# Patient Record
Sex: MALE | Race: WHITE | NOT HISPANIC OR LATINO | Employment: OTHER | URBAN - METROPOLITAN AREA
[De-identification: names, ages, dates, MRNs, and addresses within clinical notes are randomized per-mention and may not be internally consistent; named-entity substitution may affect disease eponyms.]

---

## 2021-04-16 ENCOUNTER — APPOINTMENT (OUTPATIENT)
Dept: GENERAL RADIOLOGY | Facility: HOSPITAL | Age: 46
End: 2021-04-16

## 2021-04-16 ENCOUNTER — HOSPITAL ENCOUNTER (EMERGENCY)
Facility: HOSPITAL | Age: 46
Discharge: HOME | End: 2021-04-16
Attending: EMERGENCY MEDICINE | Admitting: EMERGENCY MEDICINE

## 2021-04-16 VITALS
WEIGHT: 196 LBS | HEIGHT: 70 IN | DIASTOLIC BLOOD PRESSURE: 83 MMHG | BODY MASS INDEX: 28.06 KG/M2 | TEMPERATURE: 98.2 F | OXYGEN SATURATION: 95 % | HEART RATE: 74 BPM | SYSTOLIC BLOOD PRESSURE: 124 MMHG | RESPIRATION RATE: 18 BRPM

## 2021-04-16 DIAGNOSIS — Z45.02 AICD DISCHARGE: Primary | ICD-10-CM

## 2021-04-16 DIAGNOSIS — I47.20 VENTRICULAR TACHYARRHYTHMIA (HCC): ICD-10-CM

## 2021-04-16 LAB
ALBUMIN SERPL-MCNC: 4.5 G/DL (ref 3.5–5.2)
ALBUMIN/GLOB SERPL: 1.7 G/DL
ALP SERPL-CCNC: 92 U/L (ref 39–117)
ALT SERPL W P-5'-P-CCNC: 19 U/L (ref 1–41)
ANION GAP SERPL CALCULATED.3IONS-SCNC: 7 MMOL/L (ref 5–15)
AST SERPL-CCNC: 19 U/L (ref 1–40)
BASOPHILS # BLD AUTO: 0.25 10*3/MM3 (ref 0–0.2)
BASOPHILS NFR BLD AUTO: 1.7 % (ref 0–1.5)
BILIRUB SERPL-MCNC: 0.4 MG/DL (ref 0–1.2)
BUN SERPL-MCNC: 9 MG/DL (ref 6–20)
BUN/CREAT SERPL: 8.5 (ref 7–25)
CALCIUM SPEC-SCNC: 9.4 MG/DL (ref 8.6–10.5)
CHLORIDE SERPL-SCNC: 105 MMOL/L (ref 98–107)
CO2 SERPL-SCNC: 28 MMOL/L (ref 22–29)
CREAT SERPL-MCNC: 1.06 MG/DL (ref 0.76–1.27)
DEPRECATED RDW RBC AUTO: 58.1 FL (ref 37–54)
EOSINOPHIL # BLD AUTO: 0.29 10*3/MM3 (ref 0–0.4)
EOSINOPHIL NFR BLD AUTO: 1.9 % (ref 0.3–6.2)
ERYTHROCYTE [DISTWIDTH] IN BLOOD BY AUTOMATED COUNT: 18.4 % (ref 12.3–15.4)
GFR SERPL CREATININE-BSD FRML MDRD: 76 ML/MIN/1.73
GLOBULIN UR ELPH-MCNC: 2.6 GM/DL
GLUCOSE SERPL-MCNC: 104 MG/DL (ref 65–99)
HCT VFR BLD AUTO: 48.6 % (ref 37.5–51)
HGB BLD-MCNC: 15.8 G/DL (ref 13–17.7)
HOLD SPECIMEN: NORMAL
IMM GRANULOCYTES # BLD AUTO: 0.11 10*3/MM3 (ref 0–0.05)
IMM GRANULOCYTES NFR BLD AUTO: 0.7 % (ref 0–0.5)
LIPASE SERPL-CCNC: 25 U/L (ref 13–60)
LYMPHOCYTES # BLD AUTO: 1.33 10*3/MM3 (ref 0.7–3.1)
LYMPHOCYTES NFR BLD AUTO: 8.8 % (ref 19.6–45.3)
MCH RBC QN AUTO: 29 PG (ref 26.6–33)
MCHC RBC AUTO-ENTMCNC: 32.5 G/DL (ref 31.5–35.7)
MCV RBC AUTO: 89.2 FL (ref 79–97)
MONOCYTES # BLD AUTO: 0.78 10*3/MM3 (ref 0.1–0.9)
MONOCYTES NFR BLD AUTO: 5.2 % (ref 5–12)
NEUTROPHILS NFR BLD AUTO: 12.33 10*3/MM3 (ref 1.7–7)
NEUTROPHILS NFR BLD AUTO: 81.7 % (ref 42.7–76)
NRBC BLD AUTO-RTO: 0.2 /100 WBC (ref 0–0.2)
NT-PROBNP SERPL-MCNC: 104.1 PG/ML (ref 0–450)
PLAT MORPH BLD: NORMAL
PLATELET # BLD AUTO: 492 10*3/MM3 (ref 140–450)
PMV BLD AUTO: 11 FL (ref 6–12)
POTASSIUM SERPL-SCNC: 4.6 MMOL/L (ref 3.5–5.2)
PROT SERPL-MCNC: 7.1 G/DL (ref 6–8.5)
RBC # BLD AUTO: 5.45 10*6/MM3 (ref 4.14–5.8)
RBC MORPH BLD: NORMAL
SODIUM SERPL-SCNC: 140 MMOL/L (ref 136–145)
TROPONIN T SERPL-MCNC: <0.01 NG/ML (ref 0–0.03)
TROPONIN T SERPL-MCNC: <0.01 NG/ML (ref 0–0.03)
WBC # BLD AUTO: 15.09 10*3/MM3 (ref 3.4–10.8)
WBC MORPH BLD: NORMAL
WHOLE BLOOD HOLD SPECIMEN: NORMAL
WHOLE BLOOD HOLD SPECIMEN: NORMAL

## 2021-04-16 PROCEDURE — 84484 ASSAY OF TROPONIN QUANT: CPT | Performed by: EMERGENCY MEDICINE

## 2021-04-16 PROCEDURE — 83690 ASSAY OF LIPASE: CPT | Performed by: EMERGENCY MEDICINE

## 2021-04-16 PROCEDURE — 80053 COMPREHEN METABOLIC PANEL: CPT | Performed by: EMERGENCY MEDICINE

## 2021-04-16 PROCEDURE — 99284 EMERGENCY DEPT VISIT MOD MDM: CPT

## 2021-04-16 PROCEDURE — 83880 ASSAY OF NATRIURETIC PEPTIDE: CPT | Performed by: EMERGENCY MEDICINE

## 2021-04-16 PROCEDURE — 85025 COMPLETE CBC W/AUTO DIFF WBC: CPT | Performed by: EMERGENCY MEDICINE

## 2021-04-16 PROCEDURE — 93005 ELECTROCARDIOGRAM TRACING: CPT | Performed by: EMERGENCY MEDICINE

## 2021-04-16 PROCEDURE — 93005 ELECTROCARDIOGRAM TRACING: CPT

## 2021-04-16 PROCEDURE — 71045 X-RAY EXAM CHEST 1 VIEW: CPT

## 2021-04-16 PROCEDURE — 99285 EMERGENCY DEPT VISIT HI MDM: CPT

## 2021-04-16 PROCEDURE — 85007 BL SMEAR W/DIFF WBC COUNT: CPT | Performed by: EMERGENCY MEDICINE

## 2021-04-16 RX ORDER — ASPIRIN 81 MG/1
81 TABLET ORAL DAILY
COMMUNITY
End: 2021-07-09 | Stop reason: HOSPADM

## 2021-04-16 RX ORDER — SODIUM CHLORIDE 0.9 % (FLUSH) 0.9 %
10 SYRINGE (ML) INJECTION AS NEEDED
Status: DISCONTINUED | OUTPATIENT
Start: 2021-04-16 | End: 2021-04-16 | Stop reason: HOSPADM

## 2021-04-16 RX ORDER — ASPIRIN 81 MG/1
324 TABLET, CHEWABLE ORAL ONCE
Status: DISCONTINUED | OUTPATIENT
Start: 2021-04-16 | End: 2021-04-16 | Stop reason: HOSPADM

## 2021-04-16 RX ORDER — METOPROLOL SUCCINATE 25 MG/1
25 TABLET, EXTENDED RELEASE ORAL DAILY
Qty: 30 TABLET | Refills: 11 | Status: SHIPPED | OUTPATIENT
Start: 2021-04-16 | End: 2021-07-09 | Stop reason: HOSPADM

## 2021-04-16 NOTE — CASE MANAGEMENT/SOCIAL WORK
Continued Stay Note   [facility]     Patient Name: Sonido Newsome  MRN: [id number]  Today's Date: 4/16/2021    Admit Date: 4/16/2021    Discharge Plan     Row Name 04/16/21 1416       Plan    Plan Comments  CM received call from ED RN regarding discharge planning. Patient in need of a follow up appointment with Dr Ellison in one month. VASU called Dr Ellison's office, [phone] spoke with Shaina. Shaina stated to fax ED records to her @ [phone] and they will follow up and call patient to schedule the appointment. CM updated patient. Fax completed. Patient verbalized an understanding.             Preethi Bains RN

## 2021-04-16 NOTE — ED PROVIDER NOTES
Subjective   Patient presents emergency department after being shocked today by his AICD.  He tells me it occurred after he was outside in the cold he came back inside to where it was warm.  He developed a headache and he felt like his blood pressure was really high and he felt the shock.  He was not knocked out.  He did not fall on the ground.  He immediately started to feel better.  He has never been shocked before.  He tells me it was placed around 2007 and he has seen Dr. Ellison in the past but has not seen him for around 4 years.  He currently tells me he feels fine.  He denies any chest pain or difficulty breathing.  He denies any history of similar.      Pacemaker Problem  Location:  Shocked  Severity:  Moderate  Onset quality:  Sudden  Duration: several seconds.  Timing:  Constant  Progression:  Resolved  Relieved by:  Nothing  Worsened by:  Nothing  Associated symptoms: no abdominal pain, no chest pain, no congestion, no cough, no diarrhea, no fever, no nausea, no shortness of breath, no sore throat, no vomiting and no wheezing        Review of Systems   Constitutional: Negative for chills, diaphoresis and fever.   HENT: Negative for congestion and sore throat.    Respiratory: Negative for cough, choking, chest tightness, shortness of breath and wheezing.    Cardiovascular: Negative for chest pain and leg swelling.   Gastrointestinal: Negative for abdominal distention, abdominal pain, anal bleeding, blood in stool, constipation, diarrhea, nausea and vomiting.   Genitourinary: Negative for difficulty urinating, dysuria, flank pain, frequency, hematuria and urgency.   All other systems reviewed and are negative.      History reviewed. No pertinent past medical history.    No Known Allergies    History reviewed. No pertinent surgical history.    History reviewed. No pertinent family history.    Social History     Socioeconomic History   • Marital status:      Spouse name: Not on file   • Number of  "children: Not on file   • Years of education: Not on file   • Highest education level: Not on file   Tobacco Use   • Smoking status: Current Every Day Smoker     Packs/day: 1.00     Types: Cigarettes   • Smokeless tobacco: Never Used   Substance and Sexual Activity   • Alcohol use: Yes     Comment: socially patient states \"1 shot per month if that\"           Objective   Physical Exam  Constitutional:       Appearance: He is well-developed.   HENT:      Head: Normocephalic and atraumatic.      Right Ear: External ear normal.      Left Ear: External ear normal.      Nose: Nose normal.   Eyes:      Conjunctiva/sclera: Conjunctivae normal.      Pupils: Pupils are equal, round, and reactive to light.   Cardiovascular:      Rate and Rhythm: Normal rate and regular rhythm.      Heart sounds: Normal heart sounds.   Pulmonary:      Effort: Pulmonary effort is normal.      Breath sounds: Normal breath sounds.   Abdominal:      General: Bowel sounds are normal.      Palpations: Abdomen is soft.   Musculoskeletal:         General: Normal range of motion.      Cervical back: Normal range of motion and neck supple.   Skin:     General: Skin is warm and dry.   Neurological:      Mental Status: He is alert and oriented to person, place, and time.   Psychiatric:         Behavior: Behavior normal.         Judgment: Judgment normal.         Procedures           ED Course  ED Course as of Apr 16 1539   Fri Apr 16, 2021   1240 Our interrogation machine is down.  We are awaiting the reps arrival.    [JM]   1318 Case discussed with Dr. Ziegler and we reviewed the interrogation.  The results are VF at 224 ATP x131 J.  Patient still resting comfortably.  He has seen Dr. Ellison in the past he notes been at least 4 years per the patient.  I have paged Geovanni Gonzáles on call for Dr. Ellison.    [JM]   6569 Late entry I spoke to Geovanni Gonzáles who will see the patient in the emergency department.  The patient advises me he has not been taking any " medications except for an aspirin.    [SARA]   7769 Patient seen by cardiology.  He is to follow-up with them in 4 weeks they started him on a beta-blocker.  I did not speak with the patient prior to him leaving the emergency department.    [JM]      ED Course User Index  [JM] Fawad Pabon APRN                                           [facility]    Final diagnoses:   AICD discharge   Ventricular tachyarrhythmia (CMS/HCC)       ED Disposition  ED Disposition     ED Disposition Condition Comment    Discharge            Provider, No Known  [place] [id number]               Medication List      New Prescriptions    metoprolol succinate XL 25 MG 24 hr tablet  Commonly known as: TOPROL-XL  Take 1 tablet by mouth Daily.           Where to Get Your Medications      These medications were sent to [facility] [address] [place] [address] - [address] [place] [address] [phone]  - [phone] [place] [address]    Phone: [phone]   · metoprolol succinate XL 25 MG 24 hr tablet          Fawad Pabon APRN  04/16/21 1538

## 2021-04-16 NOTE — CONSULTS
[facility] Consult Note      Patient Care Team:  Provider, No Known as PCP - General  Provider, No Known  No ref. provider found    Subjective     History of Present Illness: Mr. Newsome is a pleasant 45-year-old male with known ischemic cardiomyopathy.  He is post [employer] dual-chamber ICD.  His last heart catheterization was in 2014 which revealed a  of the mid LAD.  He is currently being seen in the ED after a device shock.  He says this morning he went out to warm his truck when he got back in side he felt a head rush and then received a device shock.  Device interrogation reveals VF with appropriate discharge.  He currently is denying any chest pain, shortness of breath, or palpitations and says that he feels fine.  He says that he works out daily with at least 30 minutes of cardio and recently kayaked 12 to 13 miles.  The only medication that he takes at home is 81 mg aspirin.  He says that he lost his insurance and has not been able to afford his medications.  He does however have [employer] insurance currently.      Current Facility-Administered Medications:   •  aspirin chewable tablet 324 mg, 324 mg, Oral, Once, Gm Ziegler MD, Stopped at 04/16/21 1222  •  sodium chloride 0.9 % flush 10 mL, 10 mL, Intravenous, PRN, Gm Ziegler MD    Current Outpatient Medications:   •  aspirin 81 MG EC tablet, Take 81 mg by mouth Daily., Disp: , Rfl:   •  metoprolol succinate XL (TOPROL-XL) 25 MG 24 hr tablet, Take 1 tablet by mouth Daily., Disp: 30 tablet, Rfl: 11    Social History     Socioeconomic History   • Marital status:      Spouse name: Not on file   • Number of children: Not on file   • Years of education: Not on file   • Highest education level: Not on file   Tobacco Use   • Smoking status: Current Every Day Smoker     Packs/day: 1.00     Types: Cigarettes   • Smokeless tobacco: Never Used   Substance and Sexual Activity   • Alcohol  "use: Yes     Comment: socially patient states \"1 shot per month if that\"       History reviewed. No pertinent family history.    Review of Systems   Constitutional: Negative.    Eyes: Negative.    Respiratory: Negative.    Cardiovascular: Negative.    Gastrointestinal: Negative.    Endocrine: Negative.    Genitourinary: Negative.    Musculoskeletal: Negative.    Skin: Negative.    Allergic/Immunologic: Negative.    Neurological: Positive for headaches.   Hematological: Negative.    Psychiatric/Behavioral: Negative.           Objective     Vital Signs  Temp:  [98.2 °F (36.8 °C)] 98.2 °F (36.8 °C)  Heart Rate:  [71-90] 74  Resp:  [18] 18  BP: (119-140)/(74-97) 122/81    No intake or output data in the 24 hours ending 04/16/21 1353  No intake/output data recorded.    Constitutional:       Appearance: Not in distress.   Eyes:      Conjunctiva/sclera: Conjunctivae normal.      Pupils: Pupils are equal, round, and reactive to light.   HENT:      Nose: Nose normal.    Mouth/Throat:      Pharynx: Oropharynx is clear.   Neck:      Thyroid: Thyroid normal. No thyromegaly.      Vascular: JVD normal.      Lymphadenopathy: No cervical adenopathy.   Pulmonary:      Effort: Pulmonary effort is normal.      Breath sounds: Rhonchi present.   Chest:      Chest wall: Not tender to palpatation.   Cardiovascular:      Normal rate. Regular rhythm.      Murmurs: There is no murmur.      No gallop. No click.   Abdominal:      General: Bowel sounds are normal. There is no distension.      Palpations: Abdomen is soft.      Tenderness: There is no abdominal tenderness.   Musculoskeletal: Normal range of motion.         General: No tenderness.      Cervical back: Normal range of motion. Skin:     General: Skin is warm and dry.   Neurological:      Mental Status: Alert and oriented to person, place and time.           Results Review:    I reviewed the patient's new clinical results.    WBC WBC   Date/Time Value Ref Range Status   04/16/2021 " 1051 15.09 (H) 3.40 - 10.80 10*3/mm3 Final      HGB Hemoglobin   Date/Time Value Ref Range Status   04/16/2021 1051 15.8 13.0 - 17.7 g/dL Final      HCT Hematocrit   Date/Time Value Ref Range Status   04/16/2021 1051 48.6 37.5 - 51.0 % Final      Platelets Platelets   Date/Time Value Ref Range Status   04/16/2021 1051 492 (H) 140 - 450 10*3/mm3 Final        PT/INR:    No results found for: PROTIME, INR    Sodium Sodium   Date/Time Value Ref Range Status   04/16/2021 1051 140 136 - 145 mmol/L Final      Potassium Potassium   Date/Time Value Ref Range Status   04/16/2021 1051 4.6 3.5 - 5.2 mmol/L Final     Comment:     Slight hemolysis detected by analyzer. Results may be affected.      Chloride Chloride   Date/Time Value Ref Range Status   04/16/2021 1051 105 98 - 107 mmol/L Final      Bicarbonate No results found for: PLASMABICARB   BUN BUN   Date/Time Value Ref Range Status   04/16/2021 1051 9 6 - 20 mg/dL Final      Creatinine Creatinine   Date/Time Value Ref Range Status   04/16/2021 1051 1.06 0.76 - 1.27 mg/dL Final      Calcium Calcium   Date/Time Value Ref Range Status   04/16/2021 1051 9.4 8.6 - 10.5 mg/dL Final      Magnesium @RESULFAST(MG:3)@   Troponin       No results found for: TROPONINI                EKG: normal sinus rhythm, anterolateral infarct.      There is no problem list on file for this patient.      Assessment/Plan   45-year-old male with ischemic cardiomyopathy.  He received an appropriate device shock due to VF.  He is currently denying any complaints    Add beta-blocker  Follow-up in 4 weeks      I discussed the patient's findings and my recommendations with patient, nursing staff and primary care team    ESTIVEN Ramirez  04/16/21  13:53 EDT

## 2021-04-24 LAB
QT INTERVAL: 374 MS
QT INTERVAL: 400 MS
QTC INTERVAL: 452 MS
QTC INTERVAL: 464 MS

## 2021-07-08 ENCOUNTER — HOSPITAL ENCOUNTER (OUTPATIENT)
Facility: HOSPITAL | Age: 46
Discharge: HOME | End: 2021-07-09
Attending: INTERNAL MEDICINE | Admitting: INTERNAL MEDICINE

## 2021-07-08 ENCOUNTER — APPOINTMENT (OUTPATIENT)
Dept: CARDIOLOGY | Facility: HOSPITAL | Age: 46
End: 2021-07-08

## 2021-07-08 PROBLEM — Z72.0 TOBACCO USE: Status: ACTIVE | Noted: 2021-07-08

## 2021-07-08 PROBLEM — Z86.79 HISTORY OF VENTRICULAR TACHYCARDIA: Status: ACTIVE | Noted: 2021-07-08

## 2021-07-08 PROBLEM — I34.0 MODERATE MITRAL REGURGITATION: Status: ACTIVE | Noted: 2021-07-08

## 2021-07-08 PROBLEM — I47.20 VENTRICULAR TACHYCARDIA: Status: ACTIVE | Noted: 2021-07-08

## 2021-07-08 PROBLEM — Z72.0 TOBACCO USE: Chronic | Status: ACTIVE | Noted: 2021-07-08

## 2021-07-08 PROBLEM — I25.10 CAD (CORONARY ARTERY DISEASE): Status: ACTIVE | Noted: 2021-07-08

## 2021-07-08 PROBLEM — I42.8 NICM (NONISCHEMIC CARDIOMYOPATHY) (HCC): Chronic | Status: ACTIVE | Noted: 2021-07-08

## 2021-07-08 PROBLEM — I34.0 MODERATE MITRAL REGURGITATION: Chronic | Status: ACTIVE | Noted: 2021-07-08

## 2021-07-08 PROBLEM — I50.20 HFREF (HEART FAILURE WITH REDUCED EJECTION FRACTION): Chronic | Status: ACTIVE | Noted: 2021-07-08

## 2021-07-08 PROBLEM — I25.10 CAD (CORONARY ARTERY DISEASE): Chronic | Status: ACTIVE | Noted: 2021-07-08

## 2021-07-08 PROBLEM — I42.8 NICM (NONISCHEMIC CARDIOMYOPATHY) (HCC): Status: ACTIVE | Noted: 2021-07-08

## 2021-07-08 PROBLEM — I50.20 HFREF (HEART FAILURE WITH REDUCED EJECTION FRACTION): Status: ACTIVE | Noted: 2021-07-08

## 2021-07-08 LAB
ANION GAP SERPL CALCULATED.3IONS-SCNC: 13 MMOL/L (ref 5–15)
ASCENDING AORTA: 3.3 CM
BH CV ECHO MEAS - AO MAX PG (FULL): -0.26 MMHG
BH CV ECHO MEAS - AO MAX PG: 2.7 MMHG
BH CV ECHO MEAS - AO MEAN PG (FULL): 0.26 MMHG
BH CV ECHO MEAS - AO MEAN PG: 1.9 MMHG
BH CV ECHO MEAS - AO ROOT AREA (BSA CORRECTED): 1.6
BH CV ECHO MEAS - AO ROOT AREA: 9.1 CM^2
BH CV ECHO MEAS - AO ROOT DIAM: 3.4 CM
BH CV ECHO MEAS - AO V2 MAX: 82 CM/SEC
BH CV ECHO MEAS - AO V2 MEAN: 65.4 CM/SEC
BH CV ECHO MEAS - AO V2 VTI: 16.6 CM
BH CV ECHO MEAS - ASC AORTA: 3.3 CM
BH CV ECHO MEAS - AVA(I,A): 2.9 CM^2
BH CV ECHO MEAS - AVA(I,D): 2.9 CM^2
BH CV ECHO MEAS - AVA(V,A): 3.2 CM^2
BH CV ECHO MEAS - AVA(V,D): 3.2 CM^2
BH CV ECHO MEAS - BSA(HAYCOCK): 2.1 M^2
BH CV ECHO MEAS - BSA: 2.1 M^2
BH CV ECHO MEAS - BZI_BMI: 28.1 KILOGRAMS/M^2
BH CV ECHO MEAS - BZI_METRIC_HEIGHT: 177.8 CM
BH CV ECHO MEAS - BZI_METRIC_WEIGHT: 88.9 KG
BH CV ECHO MEAS - EDV(CUBED): 249 ML
BH CV ECHO MEAS - EDV(MOD-SP2): 227 ML
BH CV ECHO MEAS - EDV(MOD-SP4): 235 ML
BH CV ECHO MEAS - EDV(TEICH): 200.5 ML
BH CV ECHO MEAS - EF(CUBED): 26.3 %
BH CV ECHO MEAS - EF(MOD-BP): 22 %
BH CV ECHO MEAS - EF(MOD-SP2): 22 %
BH CV ECHO MEAS - EF(MOD-SP4): 20.4 %
BH CV ECHO MEAS - EF(TEICH): 20.7 %
BH CV ECHO MEAS - ESV(CUBED): 183.6 ML
BH CV ECHO MEAS - ESV(MOD-SP2): 177 ML
BH CV ECHO MEAS - ESV(MOD-SP4): 187 ML
BH CV ECHO MEAS - ESV(TEICH): 159 ML
BH CV ECHO MEAS - FS: 9.7 %
BH CV ECHO MEAS - IVS/LVPW: 0.96
BH CV ECHO MEAS - IVSD: 0.88 CM
BH CV ECHO MEAS - LA DIMENSION: 4.2 CM
BH CV ECHO MEAS - LA/AO: 1.2
BH CV ECHO MEAS - LAD MAJOR: 5.6 CM
BH CV ECHO MEAS - LAT PEAK E' VEL: 7.3 CM/SEC
BH CV ECHO MEAS - LATERAL E/E' RATIO: 13.4
BH CV ECHO MEAS - LV DIASTOLIC VOL/BSA (35-75): 113.6 ML/M^2
BH CV ECHO MEAS - LV IVRT: 0.07 SEC
BH CV ECHO MEAS - LV MASS(C)D: 234 GRAMS
BH CV ECHO MEAS - LV MASS(C)DI: 113.1 GRAMS/M^2
BH CV ECHO MEAS - LV MAX PG: 3 MMHG
BH CV ECHO MEAS - LV MEAN PG: 1.7 MMHG
BH CV ECHO MEAS - LV SYSTOLIC VOL/BSA (12-30): 90.4 ML/M^2
BH CV ECHO MEAS - LV V1 MAX: 85.9 CM/SEC
BH CV ECHO MEAS - LV V1 MEAN: 58.5 CM/SEC
BH CV ECHO MEAS - LV V1 VTI: 15.7 CM
BH CV ECHO MEAS - LVIDD: 6.3 CM
BH CV ECHO MEAS - LVIDS: 5.7 CM
BH CV ECHO MEAS - LVLD AP2: 10.4 CM
BH CV ECHO MEAS - LVLD AP4: 10.8 CM
BH CV ECHO MEAS - LVLS AP2: 10.5 CM
BH CV ECHO MEAS - LVLS AP4: 10.5 CM
BH CV ECHO MEAS - LVOT AREA (M): 3.1 CM^2
BH CV ECHO MEAS - LVOT AREA: 3.1 CM^2
BH CV ECHO MEAS - LVOT DIAM: 2 CM
BH CV ECHO MEAS - LVPWD: 0.92 CM
BH CV ECHO MEAS - MED PEAK E' VEL: 8.2 CM/SEC
BH CV ECHO MEAS - MEDIAL E/E' RATIO: 12
BH CV ECHO MEAS - MV A MAX VEL: 50.8 CM/SEC
BH CV ECHO MEAS - MV DEC SLOPE: 321.3 CM/SEC^2
BH CV ECHO MEAS - MV DEC TIME: 0.18 SEC
BH CV ECHO MEAS - MV E MAX VEL: 100.7 CM/SEC
BH CV ECHO MEAS - MV E/A: 2
BH CV ECHO MEAS - MV P1/2T MAX VEL: 98 CM/SEC
BH CV ECHO MEAS - MV P1/2T: 89.4 MSEC
BH CV ECHO MEAS - MVA P1/2T LCG: 2.2 CM^2
BH CV ECHO MEAS - MVA(P1/2T): 2.5 CM^2
BH CV ECHO MEAS - PA ACC SLOPE: 377.9 CM/SEC^2
BH CV ECHO MEAS - PA ACC TIME: 0.16 SEC
BH CV ECHO MEAS - PA PR(ACCEL): 6.1 MMHG
BH CV ECHO MEAS - PI END-D VEL: 112.7 CM/SEC
BH CV ECHO MEAS - RAP SYSTOLE: 15 MMHG
BH CV ECHO MEAS - RVDD: 2 CM
BH CV ECHO MEAS - RVSP: 40 MMHG
BH CV ECHO MEAS - SI(AO): 73.1 ML/M^2
BH CV ECHO MEAS - SI(CUBED): 31.6 ML/M^2
BH CV ECHO MEAS - SI(LVOT): 23.3 ML/M^2
BH CV ECHO MEAS - SI(MOD-SP2): 24.2 ML/M^2
BH CV ECHO MEAS - SI(MOD-SP4): 23.2 ML/M^2
BH CV ECHO MEAS - SI(TEICH): 20.1 ML/M^2
BH CV ECHO MEAS - SV(AO): 151.2 ML
BH CV ECHO MEAS - SV(CUBED): 65.4 ML
BH CV ECHO MEAS - SV(LVOT): 48.1 ML
BH CV ECHO MEAS - SV(MOD-SP2): 50 ML
BH CV ECHO MEAS - SV(MOD-SP4): 48 ML
BH CV ECHO MEAS - SV(TEICH): 41.5 ML
BH CV ECHO MEAS - TAPSE (>1.6): 1.7 CM
BH CV ECHO MEAS - TR MAX PG: 25 MMHG
BH CV ECHO MEAS - TR MAX VEL: 247.3 CM/SEC
BH CV ECHO MEASUREMENTS AVERAGE E/E' RATIO: 12.99
BH CV VAS BP RIGHT ARM: NORMAL MMHG
BH CV XLRA - RV BASE: 4.5 CM
BH CV XLRA - RV LENGTH: 9.3 CM
BH CV XLRA - RV MID: 3.5 CM
BH CV XLRA - TDI S': 7.46 CM/SEC
BUN SERPL-MCNC: 8 MG/DL (ref 6–20)
BUN/CREAT SERPL: 9 (ref 7–25)
CALCIUM SPEC-SCNC: 8.5 MG/DL (ref 8.6–10.5)
CHLORIDE SERPL-SCNC: 105 MMOL/L (ref 98–107)
CO2 SERPL-SCNC: 22 MMOL/L (ref 22–29)
CREAT SERPL-MCNC: 0.89 MG/DL (ref 0.76–1.27)
DEPRECATED RDW RBC AUTO: 57.6 FL (ref 37–54)
ERYTHROCYTE [DISTWIDTH] IN BLOOD BY AUTOMATED COUNT: 17.7 % (ref 12.3–15.4)
GFR SERPL CREATININE-BSD FRML MDRD: 93 ML/MIN/1.73
GLUCOSE SERPL-MCNC: 115 MG/DL (ref 65–99)
HCT VFR BLD AUTO: 44.9 % (ref 37.5–51)
HGB BLD-MCNC: 14.3 G/DL (ref 13–17.7)
LEFT ATRIUM VOLUME INDEX: 41.6 ML/M^2
LEFT ATRIUM VOLUME: 86 ML
LV EF 2D ECHO EST: 20 %
MCH RBC QN AUTO: 28.6 PG (ref 26.6–33)
MCHC RBC AUTO-ENTMCNC: 31.8 G/DL (ref 31.5–35.7)
MCV RBC AUTO: 89.8 FL (ref 79–97)
PLATELET # BLD AUTO: 508 10*3/MM3 (ref 140–450)
PMV BLD AUTO: 10.9 FL (ref 6–12)
POTASSIUM SERPL-SCNC: 3.5 MMOL/L (ref 3.5–5.2)
RBC # BLD AUTO: 5 10*6/MM3 (ref 4.14–5.8)
SARS-COV-2 RNA RESP QL NAA+PROBE: NOT DETECTED
SODIUM SERPL-SCNC: 140 MMOL/L (ref 136–145)
WBC # BLD AUTO: 12.72 10*3/MM3 (ref 3.4–10.8)

## 2021-07-08 PROCEDURE — U0003 INFECTIOUS AGENT DETECTION BY NUCLEIC ACID (DNA OR RNA); SEVERE ACUTE RESPIRATORY SYNDROME CORONAVIRUS 2 (SARS-COV-2) (CORONAVIRUS DISEASE [COVID-19]), AMPLIFIED PROBE TECHNIQUE, MAKING USE OF HIGH THROUGHPUT TECHNOLOGIES AS DESCRIBED BY CMS-2020-01-R: HCPCS | Performed by: INTERNAL MEDICINE

## 2021-07-08 PROCEDURE — G0378 HOSPITAL OBSERVATION PER HR: HCPCS

## 2021-07-08 PROCEDURE — 93005 ELECTROCARDIOGRAM TRACING: CPT | Performed by: INTERNAL MEDICINE

## 2021-07-08 PROCEDURE — 93458 L HRT ARTERY/VENTRICLE ANGIO: CPT | Performed by: INTERNAL MEDICINE

## 2021-07-08 PROCEDURE — 0 IOPAMIDOL PER 1 ML: Performed by: INTERNAL MEDICINE

## 2021-07-08 PROCEDURE — 80048 BASIC METABOLIC PNL TOTAL CA: CPT | Performed by: INTERNAL MEDICINE

## 2021-07-08 PROCEDURE — 25010000002 MIDAZOLAM PER 1 MG: Performed by: INTERNAL MEDICINE

## 2021-07-08 PROCEDURE — 25010000002 FENTANYL CITRATE (PF) 50 MCG/ML SOLUTION: Performed by: INTERNAL MEDICINE

## 2021-07-08 PROCEDURE — 93306 TTE W/DOPPLER COMPLETE: CPT

## 2021-07-08 PROCEDURE — 25010000002 HEPARIN (PORCINE) PER 1000 UNITS: Performed by: INTERNAL MEDICINE

## 2021-07-08 PROCEDURE — C1769 GUIDE WIRE: HCPCS | Performed by: INTERNAL MEDICINE

## 2021-07-08 PROCEDURE — C1894 INTRO/SHEATH, NON-LASER: HCPCS | Performed by: INTERNAL MEDICINE

## 2021-07-08 PROCEDURE — 85027 COMPLETE CBC AUTOMATED: CPT | Performed by: INTERNAL MEDICINE

## 2021-07-08 RX ORDER — METOPROLOL SUCCINATE 25 MG/1
25 TABLET, EXTENDED RELEASE ORAL DAILY
Status: DISCONTINUED | OUTPATIENT
Start: 2021-07-08 | End: 2021-07-09

## 2021-07-08 RX ORDER — POTASSIUM CHLORIDE 750 MG/1
40 CAPSULE, EXTENDED RELEASE ORAL AS NEEDED
Status: DISCONTINUED | OUTPATIENT
Start: 2021-07-08 | End: 2021-07-09 | Stop reason: HOSPADM

## 2021-07-08 RX ORDER — LIDOCAINE HYDROCHLORIDE 10 MG/ML
INJECTION, SOLUTION EPIDURAL; INFILTRATION; INTRACAUDAL; PERINEURAL AS NEEDED
Status: DISCONTINUED | OUTPATIENT
Start: 2021-07-08 | End: 2021-07-08 | Stop reason: HOSPADM

## 2021-07-08 RX ORDER — HYDROCODONE BITARTRATE AND ACETAMINOPHEN 5; 325 MG/1; MG/1
1 TABLET ORAL EVERY 4 HOURS PRN
Status: DISCONTINUED | OUTPATIENT
Start: 2021-07-08 | End: 2021-07-09 | Stop reason: HOSPADM

## 2021-07-08 RX ORDER — ASPIRIN 81 MG/1
81 TABLET ORAL DAILY
Status: DISCONTINUED | OUTPATIENT
Start: 2021-07-08 | End: 2021-07-09 | Stop reason: HOSPADM

## 2021-07-08 RX ORDER — FENTANYL CITRATE 50 UG/ML
INJECTION, SOLUTION INTRAMUSCULAR; INTRAVENOUS AS NEEDED
Status: DISCONTINUED | OUTPATIENT
Start: 2021-07-08 | End: 2021-07-08 | Stop reason: HOSPADM

## 2021-07-08 RX ORDER — MIDAZOLAM HYDROCHLORIDE 1 MG/ML
INJECTION INTRAMUSCULAR; INTRAVENOUS AS NEEDED
Status: DISCONTINUED | OUTPATIENT
Start: 2021-07-08 | End: 2021-07-08 | Stop reason: HOSPADM

## 2021-07-08 RX ORDER — NALOXONE HCL 0.4 MG/ML
0.4 VIAL (ML) INJECTION
Status: DISCONTINUED | OUTPATIENT
Start: 2021-07-08 | End: 2021-07-09 | Stop reason: HOSPADM

## 2021-07-08 RX ORDER — ACETAMINOPHEN 325 MG/1
650 TABLET ORAL EVERY 4 HOURS PRN
Status: DISCONTINUED | OUTPATIENT
Start: 2021-07-08 | End: 2021-07-09 | Stop reason: HOSPADM

## 2021-07-08 RX ORDER — ALPRAZOLAM 0.25 MG/1
0.25 TABLET ORAL 3 TIMES DAILY PRN
Status: DISCONTINUED | OUTPATIENT
Start: 2021-07-08 | End: 2021-07-09 | Stop reason: HOSPADM

## 2021-07-08 RX ORDER — MORPHINE SULFATE 2 MG/ML
1 INJECTION, SOLUTION INTRAMUSCULAR; INTRAVENOUS EVERY 4 HOURS PRN
Status: DISCONTINUED | OUTPATIENT
Start: 2021-07-08 | End: 2021-07-09 | Stop reason: HOSPADM

## 2021-07-08 RX ORDER — TEMAZEPAM 7.5 MG/1
7.5 CAPSULE ORAL NIGHTLY PRN
Status: DISCONTINUED | OUTPATIENT
Start: 2021-07-08 | End: 2021-07-09 | Stop reason: HOSPADM

## 2021-07-08 RX ORDER — POTASSIUM CHLORIDE 7.45 MG/ML
10 INJECTION INTRAVENOUS
Status: DISCONTINUED | OUTPATIENT
Start: 2021-07-08 | End: 2021-07-09 | Stop reason: HOSPADM

## 2021-07-08 RX ORDER — POTASSIUM CHLORIDE 1.5 G/1.77G
40 POWDER, FOR SOLUTION ORAL AS NEEDED
Status: DISCONTINUED | OUTPATIENT
Start: 2021-07-08 | End: 2021-07-09 | Stop reason: HOSPADM

## 2021-07-08 RX ADMIN — ASPIRIN 81 MG: 81 TABLET, COATED ORAL at 17:03

## 2021-07-08 RX ADMIN — POTASSIUM CHLORIDE 40 MEQ: 750 CAPSULE, EXTENDED RELEASE ORAL at 23:21

## 2021-07-08 RX ADMIN — POTASSIUM CHLORIDE 40 MEQ: 750 CAPSULE, EXTENDED RELEASE ORAL at 17:03

## 2021-07-08 NOTE — H&P
"[facility] History & Physical    Referring Provider: None    Patient Care Team:  Provider, No Known as PCP - General    Chief complaint No chief complaint on file.      Subjective .     History of present illness: White male well-known to me with history of previous anterior wall myocardial infarction and chronic total occlusion of the LAD status post ventricular defibrillator placement presents with shortness of breath described as severe occurring with increased exertion diminished with breath just started today after his heart started racing with tachypalpitations and some chest discomfort.  He called EMS was found to be in ventricular tachycardia and allegedly his device and shocked him so he was administered shocked by EMS.  Upon arrival he is having a bit of chest discomfort and shortness of breath and is back in sinus rhythm.    Review of Systems   All systems were reviewed and negative except for:  Constitution:  positive for fatigue    History  Past Medical History:   Diagnosis Date   • CAD 7/8/2021   • H/O VT 7/8/2021   • HFrEF (EF <20%) 7/8/2021    TTE 3/12/2014:   EF estimated to be less than 20%  Moderate mitral regurgitation.    • Moderate MR 7/8/2021   • NICM s/p ICD (2014) 7/8/2021   • Tobacco use 7/8/2021   , No past surgical history on file., History reviewed. No pertinent family history.,   Social History     Tobacco Use   • Smoking status: Current Every Day Smoker     Packs/day: 1.00     Types: Cigarettes   • Smokeless tobacco: Never Used   Substance Use Topics   • Alcohol use: Yes     Comment: socially patient states \"1 shot per month if that\"   • Drug use: Not on file   ,   Medications Prior to Admission   Medication Sig Dispense Refill Last Dose   • aspirin 81 MG EC tablet Take 81 mg by mouth Daily.      • metoprolol succinate XL (TOPROL-XL) 25 MG 24 hr tablet Take 1 tablet by mouth Daily. 30 tablet 11    , Scheduled Meds:  , Continuous Infusions:  No current " facility-administered medications for this encounter.  , PRN Meds:   and Allergies:  Patient has no known allergies.    Objective     Vital Sign Min/Max for last 24 hours  No data recorded   BP  Min: 103/66  Max: 124/78   Pulse  Min: 105  Max: 106   Resp  Min: 16  Max: 18   SpO2  Min: 97 %  Max: 98 %   No data recorded   No data recorded            Ejection Fraction  No results found for: EF    Echo EF Estimated  No results found for: ECHOEFEST    Nuclear Stress Ejection Fraction  No components found for: NUCEF    Cath Ejection Fraction Quantitative  No results found for: CATHEF    Physical Exam:     General Appearance:    Alert, cooperative, in no acute distress   Head:    Normocephalic, without obvious abnormality, atraumatic   Eyes:            Lids and lashes normal, conjunctivae and sclerae normal, no   icterus, no pallor, corneas clear, PERRLA   Ears:    Ears appear intact with no abnormalities noted   Throat:   No oral lesions, no thrush, oral mucosa moist   Neck:   No adenopathy, supple, trachea midline, no thyromegaly, no   carotid bruit, no JVD   Back:     No kyphosis present, no scoliosis present, no skin lesions,      erythema or scars, no tenderness to percussion or                   palpation,   range of motion normal   Lungs:     Clear to auscultation,respirations regular, even and                  unlabored    Heart:    Regular rhythm and normal rate, normal S1 and S2, no            murmur, no gallop, no rub, no click   Chest Wall:    No abnormalities observed   Abdomen:     Normal bowel sounds, no masses, no organomegaly, soft        non-tender, non-distended, no guarding, no rebound                tenderness   Rectal:     Deferred   Extremities:   Moves all extremities well, no edema, no cyanosis, no             redness   Pulses:   Pulses palpable and equal bilaterally   Skin:   No bleeding, bruising or rash   Lymph nodes:   No palpable adenopathy   Neurologic:   Cranial nerves 2 - 12 grossly  intact, sensation intact, DTR       present and equal bilaterally       Results Review:   I reviewed the patient's new clinical results.          Lab Results   Lab Value Date/Time    TROPONINT <0.010 04/16/2021 1226    TROPONINT <0.010 04/16/2021 1051                       Assessment/Plan       NICM s/p ICD (2014)    CAD with  of LAD    H/O VT    Tobacco use    HFrEF (EF <20%)    Moderate MR      Ventricular tachycardia  Possible anterior wall myocardial infarction  Old anterior wall myocardial infarction  [employer] ICD    Proceed with emergent cardiac catheterization possible intervention.  Risk benefits alternatives procedure been explained the patient he understand wish to proceed and further treatment based upon results of the angiographic data  Interrogate the defibrillator  Medical management for ventricular arrhythmias    I discussed the patients findings and my recommendations with patient    Mega Ellison MD  07/08/21  14:35 EDT

## 2021-07-09 ENCOUNTER — TELEPHONE (OUTPATIENT)
Dept: INTERNAL MEDICINE | Facility: CLINIC | Age: 46
End: 2021-07-09

## 2021-07-09 VITALS
HEIGHT: 70 IN | WEIGHT: 196 LBS | SYSTOLIC BLOOD PRESSURE: 128 MMHG | BODY MASS INDEX: 28.06 KG/M2 | RESPIRATION RATE: 16 BRPM | OXYGEN SATURATION: 97 % | DIASTOLIC BLOOD PRESSURE: 88 MMHG | TEMPERATURE: 97.8 F | HEART RATE: 74 BPM

## 2021-07-09 LAB
CHOLEST SERPL-MCNC: 181 MG/DL (ref 0–200)
HDLC SERPL-MCNC: 32 MG/DL (ref 40–60)
LDLC SERPL CALC-MCNC: 126 MG/DL (ref 0–100)
LDLC/HDLC SERPL: 3.88 {RATIO}
POTASSIUM SERPL-SCNC: 4.7 MMOL/L (ref 3.5–5.2)
TRIGL SERPL-MCNC: 124 MG/DL (ref 0–150)
VLDLC SERPL-MCNC: 23 MG/DL (ref 5–40)

## 2021-07-09 PROCEDURE — 84132 ASSAY OF SERUM POTASSIUM: CPT | Performed by: INTERNAL MEDICINE

## 2021-07-09 PROCEDURE — G0378 HOSPITAL OBSERVATION PER HR: HCPCS

## 2021-07-09 PROCEDURE — 80061 LIPID PANEL: CPT | Performed by: INTERNAL MEDICINE

## 2021-07-09 RX ORDER — CARVEDILOL 6.25 MG/1
6.25 TABLET ORAL 2 TIMES DAILY WITH MEALS
Status: DISCONTINUED | OUTPATIENT
Start: 2021-07-09 | End: 2021-07-09 | Stop reason: HOSPADM

## 2021-07-09 RX ORDER — CARVEDILOL 6.25 MG/1
6.25 TABLET ORAL 2 TIMES DAILY WITH MEALS
Qty: 60 TABLET | Refills: 6 | Status: SHIPPED | OUTPATIENT
Start: 2021-07-09 | End: 2021-07-29 | Stop reason: SDUPTHER

## 2021-07-09 RX ORDER — ASPIRIN 81 MG/1
81 TABLET ORAL DAILY
Qty: 30 TABLET | Refills: 6 | Status: SHIPPED | OUTPATIENT
Start: 2021-07-10

## 2021-07-09 RX ADMIN — ASPIRIN 81 MG: 81 TABLET, COATED ORAL at 08:27

## 2021-07-09 RX ADMIN — CARVEDILOL 6.25 MG: 6.25 TABLET, FILM COATED ORAL at 10:43

## 2021-07-09 NOTE — CASE MANAGEMENT/SOCIAL WORK
Case Management Discharge Note      Final Note: Spoke with pt. at bedside. Independent with ADL's. Voices no needs at this time. Spouse to provide transport.         Selected Continued Care - Discharged on 7/9/2021 Admission date: 7/8/2021 - Discharge disposition: Home or Self Care    Destination    No services have been selected for the patient.              Durable Medical Equipment    No services have been selected for the patient.              Dialysis/Infusion    No services have been selected for the patient.              Home Medical Care    No services have been selected for the patient.              Therapy    No services have been selected for the patient.              Community Resources    No services have been selected for the patient.              Community & DME    No services have been selected for the patient.                       Final Discharge Disposition Code: 01 - home or self-care

## 2021-07-09 NOTE — NURSING NOTE
"Patient stated he could not take his metoprolol due to a previous adverse reaction to it. He stated it \"made his heart rate go crazy and they told me not to take it again\". MD contacted to be made aware of the patients statements and to see if there were any changes that could be made. No changes made to the medications. Patient notified that MD would still like him to take his metoprolol, to which the patient said he would not take that medication.   "

## 2021-07-09 NOTE — PROGRESS NOTES
"                                 [facility] Progress Note      LOS: 1 day   Patient Care Team:  Provider, No Known as PCP - General    Chief Complaint:  No chief complaint on file.      Subjective     Interval History:     Patient Complaints: No chest pain or dyspnea.  No palpitations  Patient is noncompliant with medications and is going home this morning no matter what we order      Review of Systems:   A 14 point review of systems was negative except as was stated in the HPI      Objective     Vital Sign Min/Max for last 24 hours  Temp  Min: 97.8 °F (36.6 °C)  Max: 98.6 °F (37 °C)   BP  Min: 103/66  Max: 128/88   Pulse  Min: 66  Max: 106   Resp  Min: 16  Max: 18   SpO2  Min: 93 %  Max: 98 %   No data recorded   Weight  Min: 88.9 kg (196 lb)  Max: 88.9 kg (196 lb)     Flowsheet Rows      First Filed Value   Admission Height  177.8 cm (70\") Documented at 07/08/2021 1703   Admission Weight  88.9 kg (196 lb) Documented at 07/08/2021 1703          Physical Exam:  General Appearance: Alert, appears stated age and cooperative  Lungs: Clear to auscultation  Heart:: RRR   No Murmurs, Rubs or Gallops  Abdomen: Soft and nontender with adequate bowel sounds.  No organomegaly  Extremities: No cyanosis, clubbing or edema  Pulses: Pulses palpable and equal bilaterally  Skin: Warm and dry with no rash  Psych: Normal     Results Review:     I reviewed the patient's new clinical results.  Results from last 7 days   Lab Units 07/09/21  0521 07/08/21  1447   SODIUM mmol/L  --  140   POTASSIUM mmol/L 4.7 3.5   CHLORIDE mmol/L  --  105   CO2 mmol/L  --  22.0   BUN mg/dL  --  8   CREATININE mg/dL  --  0.89   GLUCOSE mg/dL  --  115*   CALCIUM mg/dL  --  8.5*     Results from last 7 days   Lab Units 07/08/21  1447   WBC 10*3/mm3 12.72*   HEMOGLOBIN g/dL 14.3   HEMATOCRIT % 44.9   PLATELETS 10*3/mm3 508*     Lab Results   Lab Value Date/Time    TROPONINT <0.010 04/16/2021 1226    TROPONINT <0.010 04/16/2021 1051 "     Results from last 7 days   Lab Units 07/09/21  0521   CHOLESTEROL mg/dL 181   TRIGLYCERIDES mg/dL 124   HDL CHOL mg/dL 32*   LDL CHOL mg/dL 126*                   Medication Review: yes  Current Facility-Administered Medications   Medication Dose Route Frequency Provider Last Rate Last Admin   • acetaminophen (TYLENOL) tablet 650 mg  650 mg Oral Q4H PRN Mega Ellison MD       • ALPRAZolam (XANAX) tablet 0.25 mg  0.25 mg Oral TID PRN Mega Ellison MD       • aspirin EC tablet 81 mg  81 mg Oral Daily Mega Ellison MD   81 mg at 07/09/21 0827   • carvedilol (COREG) tablet 6.25 mg  6.25 mg Oral BID With Meals Harsha Cotton MD       • HYDROcodone-acetaminophen (NORCO) 5-325 MG per tablet 1 tablet  1 tablet Oral Q4H PRN Mega Ellison MD       • morphine injection 1 mg  1 mg Intravenous Q4H PRN Mega Ellison MD        And   • naloxone (NARCAN) injection 0.4 mg  0.4 mg Intravenous Q5 Min PRN Mega Ellison MD       • potassium chloride (MICRO-K) CR capsule 40 mEq  40 mEq Oral PRN Mega Ellison MD   40 mEq at 07/08/21 2321    Or   • potassium chloride (KLOR-CON) packet 40 mEq  40 mEq Oral PRN Mega Ellison MD        Or   • potassium chloride 10 mEq in 100 mL IVPB  10 mEq Intravenous Q1H PRN Mega Ellison MD       • temazepam (RESTORIL) capsule 7.5 mg  7.5 mg Oral Nightly PRN Mega Ellison MD             NICM s/p ICD (2014)    CAD with  of LAD    H/O VT    Tobacco use    HFrEF (EF <20%)    Moderate MR    Ventricular tachycardia (CMS/HCC)        Impression      Ischemic cardiomyopathy/  Stable coronary artery disease with chronic total occlusion proximal LAD  Ventricular tachycardia with [employer] ICD      Plan     I have reprogrammed the ICD to include a tacky therapy for VT between 180 and 220 bpm.  We will begin carvedilol.  Patient is demanding discharge this morning      Harsha Cotton MD   07/09/21  09:52 EDT

## 2021-07-09 NOTE — PLAN OF CARE
Goal Outcome Evaluation:  Plan of Care Reviewed With: patient        Progress: improving  Outcome Summary: Cardiology informed this writer that the patient will be discharged today.  Patient informed and said his wife will be picking him up.

## 2021-07-09 NOTE — PLAN OF CARE
Goal Outcome Evaluation:  Plan of Care Reviewed With: patient        Progress: improving  Patient vital signs stable and on room air. Patient denies pain, nausea, and shortness of air. Patient voiced being upset because he did not speak with the physician post cardiac catheterization. Patient eventually agreed to stay in the hospital until seen by a physician this morning. Will maintain fall and safety precautions.

## 2021-07-09 NOTE — TELEPHONE ENCOUNTER
Caller: ZOYA HERNANDEZ    Relationship to patient: Provider    Best call back number: [phone]    Chief complaint: HOSPITAL FOLLOW UP DISCHARGED [facility] 07-09-21    Type of visit: NEW PATIENT    Requested date:N/A    If rescheduling, when is the original appointment:  July 14,21    Additional notes: PATIENT WILL BE GETTING DISCHARGED FROM [facility] FROM CARDIOLOGY

## 2021-07-10 ENCOUNTER — READMISSION MANAGEMENT (OUTPATIENT)
Dept: CALL CENTER | Facility: HOSPITAL | Age: 46
End: 2021-07-10

## 2021-07-10 NOTE — OUTREACH NOTE
Prep Survey      Responses   [facility] patient discharged from?  [place]   Is LACE score < 7 ?  Yes   Emergency Room discharge w/ pulse ox?  No   Eligibility  [facility]   Date of Admission  07/08/21   Date of Discharge  07/09/21   Discharge Disposition  Home or Self Care   Discharge diagnosis  NICM s/p ICD (2014)   Does the patient have one of the following disease processes/diagnoses(primary or secondary)?  Other   Does the patient have Home health ordered?  No   Is there a DME ordered?  No   Prep survey completed?  Yes          Shelbie Jin RN

## 2021-07-12 ENCOUNTER — TRANSITIONAL CARE MANAGEMENT TELEPHONE ENCOUNTER (OUTPATIENT)
Dept: CALL CENTER | Facility: HOSPITAL | Age: 46
End: 2021-07-12

## 2021-07-12 LAB
QT INTERVAL: 390 MS
QTC INTERVAL: 482 MS

## 2021-07-12 NOTE — OUTREACH NOTE
Call Center TCM Note      Responses   [facility], [place], operated by [facility] patient discharged from?  [place]   Does the patient have one of the following disease processes/diagnoses(primary or secondary)?  Other   TCM attempt successful?  Yes   Call start time  1558   Call end time  1602   Discharge diagnosis  NICM s/p ICD (2014)   Meds reviewed with patient/caregiver?  Yes   Is the patient having any side effects they believe may be caused by any medication additions or changes?  No   Does the patient have all medications ordered at discharge?  Yes   Is the patient taking all medications as directed (includes completed medication regime)?  Yes   Comments regarding appointments  PCP appt. with ESTIVEN Heredia 7/14/21 @ 1:45pm.   Does the patient have a primary care provider?   Yes   Does the patient have an appointment with their PCP within 7 days of discharge?  Yes   Has the patient kept scheduled appointments due by today?  N/A   Comments  Awaiting call back for Surgeon f/u appt.   Has home health visited the patient within 72 hours of discharge?  N/A   Psychosocial issues?  No   Did the patient receive a copy of their discharge instructions?  Yes   Nursing interventions  Reviewed instructions with patient   What is the patient's perception of their health status since discharge?  Same   Is the patient/caregiver able to teach back signs and symptoms related to disease process for when to call PCP?  Yes   Is the patient/caregiver able to teach back signs and symptoms related to disease process for when to call 911?  Yes   Is the patient/caregiver able to teach back the hierarchy of who to call/visit for symptoms/problems? PCP, Specialist, Home health nurse, Urgent Care, ED, 911  Yes   If the patient is a current smoker, are they able to teach back resources for cessation?  Smoking cessation medications, [id number]   TCM call completed?  Yes   Wrap up additional comments  Pt. states he feels the same. Pt. states he spoke with  nurse from PCP office earlier and he notified them of status . Pt. states he was given recommendation. Told pt. any worsening symptoms , chest pain, SOB return to ED.           Marli Lawler RN    7/12/2021, 16:08 EDT

## 2021-07-12 NOTE — DISCHARGE SUMMARY
Date of Discharge:  7/12/2021              [facility]  Date of Admit: 7/8/2021    Provider, No Known      Discharge Diagnosis:  NICM s/p ICD (2014)    CAD with  of LAD    H/O VT    Tobacco use    HFrEF (EF <20%)    Moderate MR    Ventricular tachycardia (CMS/HCC)      Hospital Course: Mr. Neswome is a pleasant 45-year-old male with known ischemic cardiomyopathy.  He is post previous [employer] ICD.  He was admitted to [facility] on 7/8/2021 due to a suspected STEMI.  He underwent a heart catheterization which revealed a  of the left anterior descending artery.  EF by by echo was estimated at 20%.  He tolerated the procedure well, there were no complications.  Post procedure he was denying any complaints and was therefore felt ready for discharge.    Procedures Performed  Procedure(s):  Left Heart Cath       Consults     No orders found from 6/9/2021 to 7/9/2021.          Pertinent Test Results: angiography:  of LAD, EF 20%  .      Discharge Physical Exam:    General Appearance No acute distress   Neck No adenopathy, supple, trachea midline, no JVD   Lungs Clear to auscultation,respirations regular, even and unlabored   Heart Regular rhythm and normal rate, normal S1 and S2, no murmur, no gallop, no rub, no click   Chest wall No abnormalities observed   Abdomen Normal bowel sounds, no masses, no hepatomegaly, soft   Extremities Moves all extremities well, no edema, no cyanosis, no redness   Neurological Alert and oriented x 3     Discharge Medications     Discharge Medications      New Medications      Instructions Start Date   carvedilol 6.25 MG tablet  Commonly known as: COREG   6.25 mg, Oral, 2 Times Daily With Meals         Continue These Medications      Instructions Start Date   aspirin 81 MG EC tablet   81 mg, Oral, Daily         Stop These Medications    metoprolol succinate XL 25 MG 24 hr tablet  Commonly known as: TOPROL-XL            Discharge Diet:   Diet  Instructions     Cardiac Diet--low fat and low Sodium            Activity at Discharge:   Activity Instructions     Activity as tolerated--Follow post angiogram precautions            Discharge disposition: home    Condition on Discharge: stable    Follow-up Appointments  Future Appointments   Date Time Provider Department Center   7/14/2021  1:45 PM Abi Granados PA-C [facility] Gregory S McLoney, [place]  07/12/21  07:24 EDT

## 2021-07-14 ENCOUNTER — OFFICE VISIT (OUTPATIENT)
Dept: INTERNAL MEDICINE | Facility: CLINIC | Age: 46
End: 2021-07-14

## 2021-07-14 VITALS
BODY MASS INDEX: 28.25 KG/M2 | TEMPERATURE: 98.4 F | HEIGHT: 67 IN | DIASTOLIC BLOOD PRESSURE: 68 MMHG | SYSTOLIC BLOOD PRESSURE: 110 MMHG | WEIGHT: 180 LBS | HEART RATE: 70 BPM

## 2021-07-14 DIAGNOSIS — I42.8 NICM (NONISCHEMIC CARDIOMYOPATHY) (HCC): Primary | ICD-10-CM

## 2021-07-14 DIAGNOSIS — Z83.3 FAMILY HISTORY OF DIABETES MELLITUS: ICD-10-CM

## 2021-07-14 DIAGNOSIS — Z13.21 ENCOUNTER FOR VITAMIN DEFICIENCY SCREENING: ICD-10-CM

## 2021-07-14 DIAGNOSIS — Z20.822 CLOSE EXPOSURE TO COVID-19 VIRUS: ICD-10-CM

## 2021-07-14 DIAGNOSIS — Z72.0 TOBACCO USE: Chronic | ICD-10-CM

## 2021-07-14 DIAGNOSIS — Z86.79 HISTORY OF VENTRICULAR TACHYCARDIA: ICD-10-CM

## 2021-07-14 DIAGNOSIS — Z12.5 PROSTATE CANCER SCREENING: ICD-10-CM

## 2021-07-14 DIAGNOSIS — I25.110 CORONARY ARTERY DISEASE INVOLVING NATIVE CORONARY ARTERY OF NATIVE HEART WITH UNSTABLE ANGINA PECTORIS (HCC): Chronic | ICD-10-CM

## 2021-07-14 PROCEDURE — 99496 TRANSJ CARE MGMT HIGH F2F 7D: CPT | Performed by: PHYSICIAN ASSISTANT

## 2021-07-14 PROCEDURE — 99203 OFFICE O/P NEW LOW 30 MIN: CPT | Performed by: PHYSICIAN ASSISTANT

## 2021-07-14 NOTE — PROGRESS NOTES
Patient Care Team:  Abi Granados PA-C as PCP - General (Physician Assistant)    Chief Complaint::   Chief Complaint   Patient presents with   • Transitional Care Management      Transitional Care Follow Up Visit  Subjective     Sonido Newsome is a 45 y.o. male who presents for a transitional care management visit.    Within 48 business hours after discharge our office contacted him via telephone to coordinate his care and needs.      I reviewed and discussed the details of that call along with the discharge summary, hospital problems, inpatient lab results, inpatient diagnostic studies, and consultation reports with Sonido.     Current outpatient and discharge medications have been reconciled for the patient.  Reviewed by: Margarita Gresham MA      Date of TCM Phone Call 7/10/2021   [facility]   Date of Admission 2021   Date of Discharge 2021   Discharge Disposition Home or Self Care     Risk for Readmission (LACE) Score: 7 (2021  6:01 AM)    SAVANAH Saab is a 45 year old male with history of ischemic cardiomyopathy s/p  ICD, tobacco abuse, CAD with chronic total occlusion of LAD, who presents to [facility] care.  He is , with 2 children ages 9 and 4 years old, and lives in [facility].  He is self-employed, works full time in automotive restoration and repair.  He is active with his job, but does not perform any regular cardiovascular exercise.  He is a longtime 1 pack-a-day smoker, until recently.  Still occasionally smoking.  Significant family history for heart disease in family.  He reports his father  suddenly of an MI at 59 years old.  His mother has had 5 bypass surgeries with Dr. Ellison.  She is living.  His only sister  at 54 years old of congestive heart failure.    He was admitted to [facility] on 2021 due to a suspected STEMI.  He underwent a heart catheterization by Dr. Ellison, which revealed a  of the left  "anterior descending artery.  EF by by echo was estimated at 20%.  He tolerated the procedure well, there were no complications.  He was discharged on low-fat, low-sodium diet, aspirin 81 mg daily, and increased to carvedilol 6.25 mg tablets twice daily.  He reports he is scheduled for cardiac rehab, but this does not start until the end of July.  He does not have a scheduled follow-up with Dr. Ellison.    Today, [facility] reports chest pain.  He reports compliance with carvedilol, but notices chest pain develops right before its time to take the second pill.  He denies shortness of breath or headaches.  He declines Covid vaccinations.  He does have cough with production.  Trying to quit smoking, reports a lighting the cigarette just to get the \"taste in his mouth.\"  He declines nicotine patches or gum at this time.            The following portions of the patient's history were reviewed and updated as appropriate: active problem list, medication list, allergies, family history, social history    Review of Systems:   Review of Systems   Constitutional: Negative for activity change, appetite change, diaphoresis, fatigue, unexpected weight gain and unexpected weight loss.   HENT: Negative for hearing loss.    Eyes: Negative for blurred vision, double vision and visual disturbance.   Respiratory: Positive for cough. Negative for choking, chest tightness and shortness of breath.    Cardiovascular: Positive for chest pain. Negative for palpitations and leg swelling.   Gastrointestinal: Negative for abdominal pain, blood in stool, GERD and indigestion.   Endocrine: Negative for cold intolerance and heat intolerance.   Genitourinary: Negative for dysuria and hematuria.   Musculoskeletal: Negative for arthralgias and myalgias.   Skin: Negative for skin lesions.   Neurological: Negative for tremors, seizures, syncope, speech difficulty, weakness, headache, memory problem and confusion.   Hematological: Does not bruise/bleed " "easily.   Psychiatric/Behavioral: Negative for sleep disturbance and depressed mood. The patient is not nervous/anxious.        Vital Signs  Vitals:    07/14/21 1354   BP: 110/68   BP Location: Right arm   Patient Position: Sitting   Cuff Size: Adult   Pulse: 70   Temp: 98.4 °F (36.9 °C)   TempSrc: Temporal   Weight: 81.6 kg (180 lb)   Height: 168.9 cm (66.5\")   PainSc: 0-No pain     Body mass index is 28.62 kg/m².    Labs  Office Visit on 07/14/2021   Component Date Value Ref Range Status   • Hemoglobin A1C 07/14/2021 5.5  4.8 - 5.6 % Final    Comment:          Prediabetes: 5.7 - 6.4           Diabetes: >6.4           Glycemic control for adults with diabetes: <7.0     • SARS-COV-2 ANTIBODIES 07/14/2021 Negative  Negative Final    Comment: This sample does not contain detectable SARS-CoV-2 antibodies. This  negative result does not rule out SARS-CoV-2 infection. Correlation  with epidemiologic risk factors and other clinical and laboratory  findings is recommended. Serologic results should not be used as the  sole basis to diagnose or exclude recent SARS-CoV-2 infection.  This assay will not detect antibodies induced by the currently  available SARS-CoV-2 vaccines. The current vaccines elicit  antibodies specific to the viral spike protein. [employer] offers  two test codes that detect viral spike-specific antibodies:  588178 SARS-CoV-2 Semi-Quantitative Total Antibody, Rafi and  833320 SARS-CoV-2 Antibody, IgG, Rafi (Qualitative).  Positive results with this SARS-CoV-2 Antibodies, Nucleocapsid  assay suggest recent or previous natural infection with  SARS-CoV-2.     • 25 Hydroxy, Vitamin D 07/14/2021 30.2  30.0 - 100.0 ng/mL Final    Comment: Vitamin D deficiency has been defined by the [place] of  Medicine and an Endocrine Society practice guideline as a  level of serum 25-OH vitamin D less than 20 ng/mL (1,2).  The Endocrine Society went on to further define vitamin D  insufficiency as a level between 21 and 29 " ng/mL (2).  1. IOM ([place] of Medicine). 2010. Dietary reference     intakes for calcium and D. [place]: The     National Academies Press.  2. Rowena MF, Klaus SANTAMARIA, Karol PACHECO, et al.     Evaluation, treatment, and prevention of vitamin D     deficiency: an Endocrine Society clinical practice     guideline. JCEM. 2011 Jul; 96(7):1911-30.     • Vitamin B-12 07/14/2021 811  232 - 1,245 pg/mL Final   • PSA 07/14/2021 0.4  0.0 - 4.0 ng/mL Final    Comment: Roche ECLIA methodology.  According to the American Urological Association, Serum PSA should  decrease and remain at undetectable levels after radical  prostatectomy. The AUA defines biochemical recurrence as an initial  PSA value 0.2 ng/mL or greater followed by a subsequent confirmatory  PSA value 0.2 ng/mL or greater.  Values obtained with different assay methods or kits cannot be used  interchangeably. Results cannot be interpreted as absolute evidence  of the presence or absence of malignant disease.     • Free T4 07/14/2021 1.84* 0.82 - 1.77 ng/dL Final   • TSH 07/14/2021 2.870  0.450 - 4.500 uIU/mL Final   • Glucose 07/14/2021 96  65 - 99 mg/dL Final   • BUN 07/14/2021 13  6 - 24 mg/dL Final   • Creatinine 07/14/2021 1.16  0.76 - 1.27 mg/dL Final   • eGFR Non  Am 07/14/2021 77  >59 mL/min/1.73 Final   • eGFR African Am 07/14/2021 89  >59 mL/min/1.73 Final    Comment: **[facility] currently reports eGFR in compliance with the current**    recommendations of the National Kidney Foundation. [facility] will    update reporting as new guidelines are published from the NKF-ASN    Task force.     • BUN/Creatinine Ratio 07/14/2021 11  9 - 20 Final   • Sodium 07/14/2021 138  134 - 144 mmol/L Final   • Potassium 07/14/2021 5.4* 3.5 - 5.2 mmol/L Final   • Chloride 07/14/2021 98  96 - 106 mmol/L Final   • Total CO2 07/14/2021 23  20 - 29 mmol/L Final   • Calcium 07/14/2021 10.2  8.7 - 10.2 mg/dL Final   • Total Protein 07/14/2021 7.5  6.0 - 8.5 g/dL  Final   • Albumin 07/14/2021 4.8  4.0 - 5.0 g/dL Final   • Globulin 07/14/2021 2.7  1.5 - 4.5 g/dL Final   • A/G Ratio 07/14/2021 1.8  1.2 - 2.2 Final   • Total Bilirubin 07/14/2021 0.8  0.0 - 1.2 mg/dL Final   • Alkaline Phosphatase 07/14/2021 112  48 - 121 IU/L Final   • AST (SGOT) 07/14/2021 19  0 - 40 IU/L Final   • ALT (SGPT) 07/14/2021 19  0 - 44 IU/L Final   • WBC 07/14/2021 12.2* 3.4 - 10.8 x10E3/uL Final   • RBC 07/14/2021 5.85* 4.14 - 5.80 x10E6/uL Final   • Hemoglobin 07/14/2021 16.6  13.0 - 17.7 g/dL Final   • Hematocrit 07/14/2021 49.8  37.5 - 51.0 % Final   • MCV 07/14/2021 85  79 - 97 fL Final   • MCH 07/14/2021 28.4  26.6 - 33.0 pg Final   • MCHC 07/14/2021 33.3  31.5 - 35.7 g/dL Final   • RDW 07/14/2021 18.4* 11.6 - 15.4 % Final   • Platelets 07/14/2021 610* 150 - 450 x10E3/uL Final   • Neutrophil Rel % 07/14/2021 74  Not Estab. % Final   • Lymphocyte Rel % 07/14/2021 14  Not Estab. % Final   • Monocyte Rel % 07/14/2021 7  Not Estab. % Final   • Eosinophil Rel % 07/14/2021 2  Not Estab. % Final   • Basophil Rel % 07/14/2021 2  Not Estab. % Final   • Neutrophils Absolute 07/14/2021 9.1* 1.4 - 7.0 x10E3/uL Final   • Lymphocytes Absolute 07/14/2021 1.7  0.7 - 3.1 x10E3/uL Final   • Monocytes Absolute 07/14/2021 0.8  0.1 - 0.9 x10E3/uL Final   • Eosinophils Absolute 07/14/2021 0.3  0.0 - 0.4 x10E3/uL Final   • Basophils Absolute 07/14/2021 0.2  0.0 - 0.2 x10E3/uL Final   • Immature Granulocyte Rel % 07/14/2021 1  Not Estab. % Final   • Immature Grans Absolute 07/14/2021 0.1  0.0 - 0.1 x10E3/uL Final   • Please note 07/14/2021 Comment   Final    Comment: The date and/or time of collection was not indicated on the  requisition as required by state and federal law.  The date of  receipt of the specimen was used as the collection date if not  supplied.     Admission on 07/08/2021, Discharged on 07/09/2021   Component Date Value Ref Range Status   • COVID19 07/08/2021 Not Detected  Not Detected - Ref. Range  Final   • QT Interval 07/08/2021 390  ms Final   • QTC Interval 07/08/2021 482  ms Final   • Glucose 07/08/2021 115* 65 - 99 mg/dL Final   • BUN 07/08/2021 8  6 - 20 mg/dL Final   • Creatinine 07/08/2021 0.89  0.76 - 1.27 mg/dL Final   • Sodium 07/08/2021 140  136 - 145 mmol/L Final   • Potassium 07/08/2021 3.5  3.5 - 5.2 mmol/L Final   • Chloride 07/08/2021 105  98 - 107 mmol/L Final   • CO2 07/08/2021 22.0  22.0 - 29.0 mmol/L Final   • Calcium 07/08/2021 8.5* 8.6 - 10.5 mg/dL Final   • eGFR Non  Amer 07/08/2021 93  >60 mL/min/1.73 Final   • BUN/Creatinine Ratio 07/08/2021 9.0  7.0 - 25.0 Final   • Anion Gap 07/08/2021 13.0  5.0 - 15.0 mmol/L Final   • WBC 07/08/2021 12.72* 3.40 - 10.80 10*3/mm3 Final   • RBC 07/08/2021 5.00  4.14 - 5.80 10*6/mm3 Final   • Hemoglobin 07/08/2021 14.3  13.0 - 17.7 g/dL Final   • Hematocrit 07/08/2021 44.9  37.5 - 51.0 % Final   • MCV 07/08/2021 89.8  79.0 - 97.0 fL Final   • MCH 07/08/2021 28.6  26.6 - 33.0 pg Final   • MCHC 07/08/2021 31.8  31.5 - 35.7 g/dL Final   • RDW 07/08/2021 17.7* 12.3 - 15.4 % Final   • RDW-SD 07/08/2021 57.6* 37.0 - 54.0 fl Final   • MPV 07/08/2021 10.9  6.0 - 12.0 fL Final   • Platelets 07/08/2021 508* 140 - 450 10*3/mm3 Final   • BSA 07/08/2021 2.1  m^2 Final   • RVIDd 07/08/2021 2.0  cm Final   • IVSd 07/08/2021 0.88  cm Final   • LVIDd 07/08/2021 6.3  cm Final   • LVIDs 07/08/2021 5.7  cm Final   • LVPWd 07/08/2021 0.92  cm Final   • IVS/LVPW 07/08/2021 0.96   Final   • FS 07/08/2021 9.7  % Final   • EDV(Teich) 07/08/2021 200.5  ml Final   • ESV(Teich) 07/08/2021 159.0  ml Final   • EF(Teich) 07/08/2021 20.7  % Final   • EDV(cubed) 07/08/2021 249.0  ml Final   • ESV(cubed) 07/08/2021 183.6  ml Final   • EF(cubed) 07/08/2021 26.3  % Final   • LV mass(C)d 07/08/2021 234.0  grams Final   • LV mass(C)dI 07/08/2021 113.1  grams/m^2 Final   • SV(Teich) 07/08/2021 41.5  ml Final   • SI(Teich) 07/08/2021 20.1  ml/m^2 Final   • SV(cubed) 07/08/2021 65.4   ml Final   • SI(cubed) 07/08/2021 31.6  ml/m^2 Final   • Ao root diam 07/08/2021 3.4  cm Final   • Ao root area 07/08/2021 9.1  cm^2 Final   • LA dimension 07/08/2021 4.2  cm Final   • asc Aorta Diam 07/08/2021 3.3  cm Final   • LA/Ao 07/08/2021 1.2   Final   • LVOT diam 07/08/2021 2.0  cm Final   • LVOT area 07/08/2021 3.1  cm^2 Final   • LVOT area(traced) 07/08/2021 3.1  cm^2 Final   • LAd major 07/08/2021 5.6  cm Final   • LVLd ap4 07/08/2021 10.8  cm Final   • EDV(MOD-sp4) 07/08/2021 235.0  ml Final   • LVLs ap4 07/08/2021 10.5  cm Final   • ESV(MOD-sp4) 07/08/2021 187.0  ml Final   • EF(MOD-sp4) 07/08/2021 20.4  % Final   • LVLd ap2 07/08/2021 10.4  cm Final   • EDV(MOD-sp2) 07/08/2021 227.0  ml Final   • LVLs ap2 07/08/2021 10.5  cm Final   • ESV(MOD-sp2) 07/08/2021 177.0  ml Final   • EF(MOD-sp2) 07/08/2021 22.0  % Final   • LA volume 07/08/2021 86.0  ml Final   • EF(MOD-bp) 07/08/2021 22.0  % Final   • SV(MOD-sp4) 07/08/2021 48.0  ml Final   • SI(MOD-sp4) 07/08/2021 23.2  ml/m^2 Final   • SV(MOD-sp2) 07/08/2021 50.0  ml Final   • SI(MOD-sp2) 07/08/2021 24.2  ml/m^2 Final   • Ao root area (BSA corrected) 07/08/2021 1.6   Final   • LV [place] Vol (BSA corrected) 07/08/2021 113.6  ml/m^2 Final   • LV Sys Vol (BSA corrected) 07/08/2021 90.4  ml/m^2 Final   • LA Volume Index 07/08/2021 41.6  ml/m^2 Final   • MV E max ashley 07/08/2021 100.7  cm/sec Final   • MV A max ashley 07/08/2021 50.8  cm/sec Final   • MV E/A 07/08/2021 2.0   Final   • LV IVRT 07/08/2021 0.07  sec Final   • MV P1/2t max ashley 07/08/2021 98.0  cm/sec Final   • MV P1/2t 07/08/2021 89.4  msec Final   • MVA(P1/2t) 07/08/2021 2.5  cm^2 Final   • MV dec slope 07/08/2021 321.3  cm/sec^2 Final   • MV dec time 07/08/2021 0.18  sec Final   • Ao pk ashley 07/08/2021 82.0  cm/sec Final   • Ao max PG 07/08/2021 2.7  mmHg Final   • Ao max PG (full) 07/08/2021 -0.26  mmHg Final   • Ao V2 mean 07/08/2021 65.4  cm/sec Final   • Ao mean PG 07/08/2021 1.9  mmHg Final   • Ao  mean PG (full) 07/08/2021 0.26  mmHg Final   • Ao V2 VTI 07/08/2021 16.6  cm Final   • ANTOLIN(I,A) 07/08/2021 2.9  cm^2 Final   • ANTOLIN(I,D) 07/08/2021 2.9  cm^2 Final   • ANTOLIN(V,A) 07/08/2021 3.2  cm^2 Final   • ANTOLIN(V,D) 07/08/2021 3.2  cm^2 Final   • LV V1 max PG 07/08/2021 3.0  mmHg Final   • LV V1 mean PG 07/08/2021 1.7  mmHg Final   • LV V1 max 07/08/2021 85.9  cm/sec Final   • LV V1 mean 07/08/2021 58.5  cm/sec Final   • LV V1 VTI 07/08/2021 15.7  cm Final   • SV(Ao) 07/08/2021 151.2  ml Final   • SI(Ao) 07/08/2021 73.1  ml/m^2 Final   • SV(LVOT) 07/08/2021 48.1  ml Final   • SI(LVOT) 07/08/2021 23.3  ml/m^2 Final   • PA acc slope 07/08/2021 377.9  cm/sec^2 Final   • PA acc time 07/08/2021 0.16  sec Final   • PI end-d gilson 07/08/2021 112.7  cm/sec Final   • TR max gilson 07/08/2021 247.3  cm/sec Final   • TR max PG 07/08/2021 25.0  mmHg Final   • RVSP(TR) 07/08/2021 40  mmHg Final   • RAP systole 07/08/2021 15  mmHg Final   • PA pr(Accel) 07/08/2021 6.1  mmHg Final   • MVA P1/2T LCG 07/08/2021 2.2  cm^2 Final   • Lat E/e'  07/08/2021 13.4   Final   • Med E/e' 07/08/2021 12.0   Final   • Lat Peak E' Gilson 07/08/2021 7.3  cm/sec Final   • Med Peak E' Gilson 07/08/2021 8.2  cm/sec Final   • BH CV ECHO LOLLY - BZI_BMI 07/08/2021 28.1  kilograms/m^2 Final   • BH CV ECHO LOLLY - BSA([place]) 07/08/2021 2.1  m^2 Final   • BH CV ECHO LOLLY - BZI_METRIC_WEIGHT 07/08/2021 88.9  kg Final   • BH CV ECHO LOLLY - BZI_METRIC_HEIGHT 07/08/2021 177.8  cm Final   • Avg E/e' ratio 07/08/2021 12.99   Final   • BH CV VAS BP RIGHT ARM 07/08/2021 113/72  mmHg Final   • RV S' 07/08/2021 7.46  cm/sec Final   • RV Base 07/08/2021 4.50  cm Final   • RV Length 07/08/2021 9.30  cm Final   • RV Mid 07/08/2021 3.50  cm Final   • Ascending aorta 07/08/2021 3.3  cm Final   • TAPSE (>1.6) 07/08/2021 1.70  cm Final   • Echo EF Estimated 07/08/2021 20  % Final   • Total Cholesterol 07/09/2021 181  0 - 200 mg/dL Final   • Triglycerides 07/09/2021 124  0 - 150 mg/dL  Final   • HDL Cholesterol 07/09/2021 32* 40 - 60 mg/dL Final   • LDL Cholesterol  07/09/2021 126* 0 - 100 mg/dL Final   • VLDL Cholesterol 07/09/2021 23  5 - 40 mg/dL Final   • LDL/HDL Ratio 07/09/2021 3.88   Final   • Potassium 07/09/2021 4.7  3.5 - 5.2 mmol/L Final   Admission on 04/16/2021, Discharged on 04/16/2021   Component Date Value Ref Range Status   • QT Interval 04/16/2021 374  ms Final   • QTC Interval 04/16/2021 452  ms Final   • QT Interval 04/16/2021 400  ms Final   • QTC Interval 04/16/2021 464  ms Final   • Troponin T 04/16/2021 <0.010  0.000 - 0.030 ng/mL Final   • Troponin T 04/16/2021 <0.010  0.000 - 0.030 ng/mL Final   • Glucose 04/16/2021 104* 65 - 99 mg/dL Final   • BUN 04/16/2021 9  6 - 20 mg/dL Final   • Creatinine 04/16/2021 1.06  0.76 - 1.27 mg/dL Final   • Sodium 04/16/2021 140  136 - 145 mmol/L Final   • Potassium 04/16/2021 4.6  3.5 - 5.2 mmol/L Final    Slight hemolysis detected by analyzer. Results may be affected.   • Chloride 04/16/2021 105  98 - 107 mmol/L Final   • CO2 04/16/2021 28.0  22.0 - 29.0 mmol/L Final   • Calcium 04/16/2021 9.4  8.6 - 10.5 mg/dL Final   • Total Protein 04/16/2021 7.1  6.0 - 8.5 g/dL Final   • Albumin 04/16/2021 4.50  3.50 - 5.20 g/dL Final   • ALT (SGPT) 04/16/2021 19  1 - 41 U/L Final   • AST (SGOT) 04/16/2021 19  1 - 40 U/L Final   • Alkaline Phosphatase 04/16/2021 92  39 - 117 U/L Final   • Total Bilirubin 04/16/2021 0.4  0.0 - 1.2 mg/dL Final   • eGFR Non African Amer 04/16/2021 76  >60 mL/min/1.73 Final   • Globulin 04/16/2021 2.6  gm/dL Final   • A/G Ratio 04/16/2021 1.7  g/dL Final   • BUN/Creatinine Ratio 04/16/2021 8.5  7.0 - 25.0 Final   • Anion Gap 04/16/2021 7.0  5.0 - 15.0 mmol/L Final   • Lipase 04/16/2021 25  13 - 60 U/L Final   • proBNP 04/16/2021 104.1  0.0 - 450.0 pg/mL Final   • Extra Tube 04/16/2021 hold for add-on   Final    Auto resulted   • Extra Tube 04/16/2021 Hold for add-ons.   Final    Auto resulted.   • Extra Tube 04/16/2021  hold for add-on   Final    Auto resulted   • Extra Tube 04/16/2021 Hold for add-ons.   Final    Auto resulted.   • Extra Tube 04/16/2021 Hold for add-ons.   Final    Auto resulted.   • WBC 04/16/2021 15.09* 3.40 - 10.80 10*3/mm3 Final   • RBC 04/16/2021 5.45  4.14 - 5.80 10*6/mm3 Final   • Hemoglobin 04/16/2021 15.8  13.0 - 17.7 g/dL Final   • Hematocrit 04/16/2021 48.6  37.5 - 51.0 % Final   • MCV 04/16/2021 89.2  79.0 - 97.0 fL Final   • MCH 04/16/2021 29.0  26.6 - 33.0 pg Final   • MCHC 04/16/2021 32.5  31.5 - 35.7 g/dL Final   • RDW 04/16/2021 18.4* 12.3 - 15.4 % Final   • RDW-SD 04/16/2021 58.1* 37.0 - 54.0 fl Final   • MPV 04/16/2021 11.0  6.0 - 12.0 fL Final   • Platelets 04/16/2021 492* 140 - 450 10*3/mm3 Final   • Neutrophil % 04/16/2021 81.7* 42.7 - 76.0 % Final   • Lymphocyte % 04/16/2021 8.8* 19.6 - 45.3 % Final   • Monocyte % 04/16/2021 5.2  5.0 - 12.0 % Final   • Eosinophil % 04/16/2021 1.9  0.3 - 6.2 % Final   • Basophil % 04/16/2021 1.7* 0.0 - 1.5 % Final   • Immature Grans % 04/16/2021 0.7* 0.0 - 0.5 % Final   • Neutrophils, Absolute 04/16/2021 12.33* 1.70 - 7.00 10*3/mm3 Final   • Lymphocytes, Absolute 04/16/2021 1.33  0.70 - 3.10 10*3/mm3 Final   • Monocytes, Absolute 04/16/2021 0.78  0.10 - 0.90 10*3/mm3 Final   • Eosinophils, Absolute 04/16/2021 0.29  0.00 - 0.40 10*3/mm3 Final   • Basophils, Absolute 04/16/2021 0.25* 0.00 - 0.20 10*3/mm3 Final   • Immature Grans, Absolute 04/16/2021 0.11* 0.00 - 0.05 10*3/mm3 Final   • nRBC 04/16/2021 0.2  0.0 - 0.2 /100 WBC Final   • RBC Morphology 04/16/2021 Normal  Normal Final   • WBC Morphology 04/16/2021 Normal  Normal Final   • Platelet Morphology 04/16/2021 Normal  Normal Final       Imaging  No radiology results for the last 30 days.      Current Outpatient Medications:   •  aspirin 81 MG EC tablet, Take 1 tablet by mouth Daily., Disp: 30 tablet, Rfl: 6  •  carvedilol (COREG) 6.25 MG tablet, Take 1 tablet by mouth 2 (Two) Times a Day With Meals., Disp:  60 tablet, Rfl: 6    Physical Exam:    Physical Exam  Vitals reviewed.   Constitutional:       Appearance: Normal appearance. He is well-developed.   HENT:      Head: Normocephalic and atraumatic.      Right Ear: Hearing, tympanic membrane, ear canal and external ear normal.      Left Ear: Hearing, tympanic membrane, ear canal and external ear normal.      Nose: Nose normal.      Mouth/Throat:      Pharynx: Uvula midline.   Eyes:      General: Lids are normal.      Conjunctiva/sclera: Conjunctivae normal.      Pupils: Pupils are equal, round, and reactive to light.   Cardiovascular:      Rate and Rhythm: Normal rate and regular rhythm.      Heart sounds: Normal heart sounds.   Pulmonary:      Effort: Pulmonary effort is normal.      Breath sounds: Wheezing present.   Abdominal:      General: Bowel sounds are normal.      Palpations: Abdomen is soft.   Musculoskeletal:         General: Normal range of motion.      Cervical back: Full passive range of motion without pain, normal range of motion and neck supple.   Skin:     General: Skin is warm and dry.   Neurological:      Mental Status: He is alert and oriented to person, place, and time.      Deep Tendon Reflexes: Reflexes are normal and symmetric.   Psychiatric:         Speech: Speech normal.         Behavior: Behavior normal.         Thought Content: Thought content normal.         Judgment: Judgment normal.         Procedures        Assessment/Plan   Problem List Items Addressed This Visit        Cardiac and Vasculature    NICM s/p ICD (2014) (Chronic)    Relevant Medications    carvedilol (COREG) 6.25 MG tablet    Other Relevant Orders    CBC & Differential (Completed)    Comprehensive Metabolic Panel (Completed)    Ambulatory Referral to [facility] [place] - [place]    CAD with  of LAD (Chronic)    Overview     Coronary dominance, right  · LM:   Normal  · LAD: 100% chronic total occlusion proximally with collaterals from the right system  · LCX:  Luminal irregularities  · RCA: Luminal irregularities, collaterals to the LAD     LV: LVEF severe mid and distal anterior wall apical distal inferior wall dyskinesis with a large scar ejection fraction approximately 20%               Relevant Medications    carvedilol (COREG) 6.25 MG tablet    H/O VT - Primary    Relevant Orders    TSH (Completed)    T4, Free (Completed)    Ambulatory Referral to [facility] [place] [facility] [place] - [place]       Tobacco    Tobacco use (Chronic)    Overview     Emphasis placed on smoking cessation.  Patient declines nicotine gum or patches at this time.           Other Visit Diagnoses     Prostate cancer screening        Relevant Orders    PSA Screen (Completed)    Encounter for vitamin deficiency screening        Relevant Orders    Vitamin B12 (Completed)    Vitamin D 25 Hydroxy (Completed)    Close exposure to COVID-19 virus        Relevant Orders    SARS-CoV-2 Antibodies (Roche) (Completed)    Family history of diabetes mellitus        Relevant Orders    Hemoglobin A1c (Completed)          Return in about 5 weeks (around 8/18/2021) for Recheck for 30 minutes.    Plan of care reviewed with patient at the conclusion of today's visit. Education was provided regarding diagnosis, management, and any prescribed or recommended OTC medications.Patient verbalizes understanding of and agreement with management plan.       Abi Granados PA-C    Please note that portions of this note were completed with a voice recognition program. Efforts were made to edit the dictations, but occasionally words are mistranscribed.

## 2021-07-14 NOTE — PROGRESS NOTES
Transitional Care Follow Up Visit  Subjective     Sonido Newsome is a 45 y.o. male who presents for a transitional care management visit.    Within 48 business hours after discharge our office contacted him via telephone to coordinate his care and needs.      I reviewed and discussed the details of that call along with the discharge summary, hospital problems, inpatient lab results, inpatient diagnostic studies, and consultation reports with Sonido.     Current outpatient and discharge medications have been reconciled for the patient.  Reviewed by: Margarita Gresham MA      Date of TCM Phone Call 7/10/2021   [facility]   Date of Admission 7/8/2021   Date of Discharge 7/9/2021   Discharge Disposition Home or Self Care     Risk for Readmission (LACE) Score: 7 (7/9/2021  6:01 AM)      History of Present Illness   Course During Hospital Stay:  ***     {Common H&P Review Areas:22596}    Review of Systems    Objective   Physical Exam    Assessment/Plan   {Assess/PlanSmartLinks:69357}

## 2021-07-15 LAB
25(OH)D3+25(OH)D2 SERPL-MCNC: 30.2 NG/ML (ref 30–100)
ALBUMIN SERPL-MCNC: 4.8 G/DL (ref 4–5)
ALBUMIN/GLOB SERPL: 1.8 {RATIO} (ref 1.2–2.2)
ALP SERPL-CCNC: 112 IU/L (ref 48–121)
ALT SERPL-CCNC: 19 IU/L (ref 0–44)
AST SERPL-CCNC: 19 IU/L (ref 0–40)
BASOPHILS # BLD AUTO: 0.2 X10E3/UL (ref 0–0.2)
BASOPHILS NFR BLD AUTO: 2 %
BILIRUB SERPL-MCNC: 0.8 MG/DL (ref 0–1.2)
BUN SERPL-MCNC: 13 MG/DL (ref 6–24)
BUN/CREAT SERPL: 11 (ref 9–20)
CALCIUM SERPL-MCNC: 10.2 MG/DL (ref 8.7–10.2)
CHLORIDE SERPL-SCNC: 98 MMOL/L (ref 96–106)
CO2 SERPL-SCNC: 23 MMOL/L (ref 20–29)
CREAT SERPL-MCNC: 1.16 MG/DL (ref 0.76–1.27)
EOSINOPHIL # BLD AUTO: 0.3 X10E3/UL (ref 0–0.4)
EOSINOPHIL NFR BLD AUTO: 2 %
ERYTHROCYTE [DISTWIDTH] IN BLOOD BY AUTOMATED COUNT: 18.4 % (ref 11.6–15.4)
GLOBULIN SER CALC-MCNC: 2.7 G/DL (ref 1.5–4.5)
GLUCOSE SERPL-MCNC: 96 MG/DL (ref 65–99)
HBA1C MFR BLD: 5.5 % (ref 4.8–5.6)
HCT VFR BLD AUTO: 49.8 % (ref 37.5–51)
HGB BLD-MCNC: 16.6 G/DL (ref 13–17.7)
IMM GRANULOCYTES # BLD AUTO: 0.1 X10E3/UL (ref 0–0.1)
IMM GRANULOCYTES NFR BLD AUTO: 1 %
LYMPHOCYTES # BLD AUTO: 1.7 X10E3/UL (ref 0.7–3.1)
LYMPHOCYTES NFR BLD AUTO: 14 %
Lab: NORMAL
MCH RBC QN AUTO: 28.4 PG (ref 26.6–33)
MCHC RBC AUTO-ENTMCNC: 33.3 G/DL (ref 31.5–35.7)
MCV RBC AUTO: 85 FL (ref 79–97)
MONOCYTES # BLD AUTO: 0.8 X10E3/UL (ref 0.1–0.9)
MONOCYTES NFR BLD AUTO: 7 %
NEUTROPHILS # BLD AUTO: 9.1 X10E3/UL (ref 1.4–7)
NEUTROPHILS NFR BLD AUTO: 74 %
PLATELET # BLD AUTO: 610 X10E3/UL (ref 150–450)
POTASSIUM SERPL-SCNC: 5.4 MMOL/L (ref 3.5–5.2)
PROT SERPL-MCNC: 7.5 G/DL (ref 6–8.5)
PSA SERPL-MCNC: 0.4 NG/ML (ref 0–4)
RBC # BLD AUTO: 5.85 X10E6/UL (ref 4.14–5.8)
SARS-COV-2 AB SERPL QL IA: NEGATIVE
SODIUM SERPL-SCNC: 138 MMOL/L (ref 134–144)
T4 FREE SERPL-MCNC: 1.84 NG/DL (ref 0.82–1.77)
TSH SERPL DL<=0.005 MIU/L-ACNC: 2.87 UIU/ML (ref 0.45–4.5)
VIT B12 SERPL-MCNC: 811 PG/ML (ref 232–1245)
WBC # BLD AUTO: 12.2 X10E3/UL (ref 3.4–10.8)

## 2021-07-20 ENCOUNTER — OFFICE VISIT (OUTPATIENT)
Dept: CARDIOLOGY | Facility: HOSPITAL | Age: 46
End: 2021-07-20

## 2021-07-20 VITALS
RESPIRATION RATE: 15 BRPM | SYSTOLIC BLOOD PRESSURE: 119 MMHG | HEART RATE: 65 BPM | TEMPERATURE: 98.6 F | OXYGEN SATURATION: 99 % | HEIGHT: 67 IN | DIASTOLIC BLOOD PRESSURE: 66 MMHG | BODY MASS INDEX: 28.47 KG/M2 | WEIGHT: 181.38 LBS

## 2021-07-20 DIAGNOSIS — Z72.0 TOBACCO USE: ICD-10-CM

## 2021-07-20 DIAGNOSIS — I47.20 VENTRICULAR TACHYCARDIA (HCC): ICD-10-CM

## 2021-07-20 DIAGNOSIS — I25.10 CORONARY ARTERY DISEASE INVOLVING NATIVE CORONARY ARTERY OF NATIVE HEART WITHOUT ANGINA PECTORIS: Primary | ICD-10-CM

## 2021-07-20 DIAGNOSIS — I34.0 MODERATE MITRAL REGURGITATION: ICD-10-CM

## 2021-07-20 DIAGNOSIS — I50.20 HFREF (HEART FAILURE WITH REDUCED EJECTION FRACTION) (HCC): ICD-10-CM

## 2021-07-20 PROCEDURE — 99204 OFFICE O/P NEW MOD 45 MIN: CPT | Performed by: NURSE PRACTITIONER

## 2021-07-20 NOTE — PROGRESS NOTES
"Chief Complaint  Cardiomyopathy and Establish Care    Subjective    History of Present Illness {  Problem List  Visit  Diagnosis   Encounters  Notes  Medications  Labs  Result Review Imaging  Media :23}       History of Present Illness   45-year-old male presents the office today for ongoing evaluation of his ischemic cardiomyopathy.  Has a history of CAD with  of the LAD, history of VT, reduced EF with EF less than 20%, moderate MR, tobacco abuse.  He has a [employer] ICD.  Recently underwent heart cath per Dr. Ellison on 7/8/2021 which showed 100% chronic total occlusion of the LAD.  Luminal irregularities in left main, circumflex and RCA.  He is a patient of Dr. Ellison and would like to transition to [facility]. Plans to begin cardiac rehab in [place] 8/29.  Reports dyspnea on exertion and fatigue.  Notes blood pressure at home is 110-120 systolic with most readings occurring in the 1 teens.  Had been evaluated in April for ICD shock.  Objective     Vital Signs:   Vitals:    07/20/21 0930 07/20/21 0931 07/20/21 0932   BP: 111/70 115/75 119/66   BP Location: Right arm Left arm Left arm   Patient Position: Sitting Sitting Sitting   Cuff Size: Adult Adult Adult   Pulse: 74 78 65   Resp:   15   Temp:   98.6 °F (37 °C)   TempSrc:   Temporal   SpO2: 97% 100% 99%   Weight:   82.3 kg (181 lb 6 oz)   Height:   168.9 cm (66.5\")     Body mass index is 28.84 kg/m².  Physical Exam  Vitals and nursing note reviewed.   Constitutional:       Appearance: Normal appearance.   HENT:      Head: Normocephalic.   Eyes:      Pupils: Pupils are equal, round, and reactive to light.   Cardiovascular:      Rate and Rhythm: Normal rate and regular rhythm.      Pulses: Normal pulses.      Heart sounds: Normal heart sounds. No murmur heard.     Pulmonary:      Effort: Pulmonary effort is normal.      Breath sounds: Normal breath sounds.   Abdominal:      General: Bowel sounds are normal.      Palpations: Abdomen is soft. "   Musculoskeletal:         General: Normal range of motion.      Cervical back: Normal range of motion.      Right lower leg: No edema.      Left lower leg: No edema.   Skin:     General: Skin is warm and dry.      Capillary Refill: Capillary refill takes less than 2 seconds.   Neurological:      Mental Status: He is alert and oriented to person, place, and time.   Psychiatric:         Mood and Affect: Mood normal.         Thought Content: Thought content normal.              Result Review  Data Reviewed:{ Labs  Result Review  Imaging  Med Tab  Media :23}   CBC & Differential (07/14/2021 14:59)  Comprehensive Metabolic Panel (07/14/2021 14:59)  TSH (07/14/2021 14:59)  T4, Free (07/14/2021 14:59)  Cardiac Catheterization/Vascular Study (07/08/2021 14:15)  ECG 12 Lead (07/08/2021 15:03)  Adult Transthoracic Echo Complete W/ Cont if Necessary Per Protocol (07/08/2021 17:06)               Assessment and Plan {CC Problem List  Visit Diagnosis  ROS  Review (Popup)  Health Maintenance  Quality  BestPractice  Medications  SmartSets  SnapShot Encounters  Media :23}   1. Coronary artery disease involving native coronary artery of native heart without angina pectoris  Continue asa     - Ambulatory Referral to Cardiology-[place] office per patient request   2. HFrEF (EF <20%)  Coreg   Did discuss initiation of entresto but patient would like to wait at this time  Did discuss possible evaluation for Optimizer  - Ambulatory Referral to Cardiology  nyha II-III  Will begin cardiac rehab in near future  3. Moderate MR    - Ambulatory Referral to Cardiology    4. Ventricular tachycardia (CMS/HCC)  [place] [employer] ICD in place   - Ambulatory Referral to Cardiology    5. Tobacco use  Currently smoking 1 ppd and not interested in quitting   - Ambulatory Referral to Cardiology          Follow Up {Instructions Charge Capture  Follow-up Communications :23}   Return if symptoms worsen or fail to improve.    Patient  was given instructions and counseling regarding his condition or for health maintenance advice. Please see specific information pulled into the AVS if appropriate.  Patient was instructed to call the [facility] with any questions, concerns, or worsening symptoms.    *Please note that portions of this note were completed with a voice recognition program. Efforts were made to edit the dictations, but occasionally words are mistranscribed.

## 2021-07-21 ENCOUNTER — PATIENT ROUNDING (BHMG ONLY) (OUTPATIENT)
Dept: INTERNAL MEDICINE | Facility: CLINIC | Age: 46
End: 2021-07-21

## 2021-07-21 NOTE — PROGRESS NOTES
July 21, 2021    Hello, may I speak with Sonido Newsome?    My name is Sara Silverman & I'm the practice mgr here at  [facility] with [facility].    Before we get started may I verify your date of birth? 1975    I am calling to officially welcome you to our practice and ask about your recent visit. Is this a good time to talk? Yes    Again I just wanted to welcome you & ask if everything went smoothly on your first visit?   Yes, ma'am it did.       We're always looking for ways to make our patients' experiences even better. Do you have recommendations on ways we may improve? No not really.       I see that we're seeing you again soon so welcome again. Is there anything else I can do for you? NO      Thank you, and have a great day.

## 2021-07-26 RX ORDER — ERGOCALCIFEROL 1.25 MG/1
50000 CAPSULE ORAL WEEKLY
Qty: 4 CAPSULE | Refills: 3 | Status: SHIPPED | OUTPATIENT
Start: 2021-07-26 | End: 2021-11-08

## 2021-07-28 ENCOUNTER — TELEPHONE (OUTPATIENT)
Dept: INTERNAL MEDICINE | Facility: CLINIC | Age: 46
End: 2021-07-28

## 2021-07-28 NOTE — TELEPHONE ENCOUNTER
"Patient states his BP averaging 116-128/60-70.  This occurences happen when he has \"been up moving around a lot\".  He has to sit down to recover.    "

## 2021-07-28 NOTE — TELEPHONE ENCOUNTER
Patient verbalized understanding but stated he already increased his carvedilol to BID per Dr. Ellison for his spikes.  Per TAW he needs to follow up with Dr. Ellison. Patient verbalized understanding.

## 2021-07-28 NOTE — TELEPHONE ENCOUNTER
Pt called stating that his blood pressure is spiking randomly and quick as he is not able to catch this on his bp machine. Pt states that he can feel his bp going up.    He also stated that when he was in the shower last night, as soon as the water hit his back he immediately felt his blood pressure go up. He stated that it felt like his defibbrillator was going to kick off. He felt terrible last night but took another carvedilol and felt better with in 15 minutes.    Pt stated he will be seeing a new cardiologist but that is not until 8/5/21.     Pt is unsure how to handle this when his blood pressure spikes.     Pt is aware Abi is not here today.

## 2021-07-28 NOTE — TELEPHONE ENCOUNTER
Tough situation, since we are not sure how high is blood pressure is.  One option would be to increase his carvedilol to 12.5 mg twice a day.  That might possibly prevent the spikes.

## 2021-07-29 ENCOUNTER — TELEPHONE (OUTPATIENT)
Dept: CARDIOLOGY | Facility: HOSPITAL | Age: 46
End: 2021-07-29

## 2021-07-29 PROBLEM — E78.5 HYPERLIPIDEMIA LDL GOAL <70: Status: ACTIVE | Noted: 2021-07-29

## 2021-07-29 PROBLEM — I10 ESSENTIAL HYPERTENSION: Status: ACTIVE | Noted: 2021-07-29

## 2021-07-29 RX ORDER — CARVEDILOL 6.25 MG/1
12.5 TABLET ORAL 2 TIMES DAILY WITH MEALS
Qty: 60 TABLET | Refills: 6
Start: 2021-07-29 | End: 2022-08-22 | Stop reason: DRUGHIGH

## 2021-07-29 NOTE — TELEPHONE ENCOUNTER
PCP office called and stated that patient called them and is having blood pressure issues. Stating that they are randomly spiking. Stated that blood pressures are 116-128/60-60. He is on carvedilol 6.25 mg bid. Stated yesterday he was taking shower and he felt like his blood pressure went up and was afraid his defibrillator was going to go off so he took another carvedilol and in about 15 minutes later he felt better.

## 2021-07-29 NOTE — TELEPHONE ENCOUNTER
Actually he has seen Ketty Kennedy (who is out of town this week) and is scheduled to follow up with Dr. Butler now. He is transferring care.  He is a complicated heart patient.  He has appt with Dr. Butler in August.  Can we contact [facility] with this blood pressure question?  If they cannot answer, can we get him in with Dr. Butler sooner>

## 2021-07-29 NOTE — TELEPHONE ENCOUNTER
Keyla from [facility] said they would contact patient with what he should do when BP spikes. Yudith from [place] [facility] scheduled the patient for 8/02/21 @ 9 am in [facility]. Patient verbalized understanding for expecting call from [facility] and appointment being bumped up .

## 2021-07-29 NOTE — TELEPHONE ENCOUNTER
"Spoke with patient over the phone. He reports that he has been having intermittent feelings of feeling warm in his chest and his head, sometimes occurs with position changes, sometimes when walking, has also happened when he is urinating. He says his ears will ring and it feels like a quick \"build up of pressure\". He says if he doesn't sit down that he feels as if his ICD is going to fire. Symptoms resolve quickly once he rests. He denies palpitations.  He says he always feels lightheaded to some extent and can't drive since starting the carvedilol.  He says the carvedilol wears off in about 5 hours and this is usually when he feels the \"spike\". He has not checked his blood pressure during these episodes because he usually doesn't have is cuff close by.  He reports his carvedilol was just increased a week ago by Dr. Ellison's office for this reason.  He recently started cardiac rehab.  He reports when he does check his blood pressure most of his systolic blood pressures are less than 120. His PCP called today and moved his appt up with Dr. Butler for Monday.  I offered to start him on Entresto, but he is concerned because of dizziness and lightheadedness already associated with carvedilol and is afraid that his blood pressure will drop further.  I advised him that I am not sure that his symptoms are related to hypertension and could also be caused by hypotension or arrhythmia.  He is certain it is due to elevated blood pressure.  At this time, he would prefer to follow-up with Dr. Butler on Monday and continue to monitor.  I advised him to call us if he has any worsening symptoms, especially if BP is truly elevating and I would be happy to send it Entresto.   "

## 2021-08-02 ENCOUNTER — OFFICE VISIT (OUTPATIENT)
Dept: CARDIOLOGY | Facility: CLINIC | Age: 46
End: 2021-08-02

## 2021-08-02 VITALS
SYSTOLIC BLOOD PRESSURE: 112 MMHG | BODY MASS INDEX: 25.62 KG/M2 | DIASTOLIC BLOOD PRESSURE: 64 MMHG | OXYGEN SATURATION: 98 % | HEART RATE: 67 BPM | WEIGHT: 179 LBS | HEIGHT: 70 IN

## 2021-08-02 DIAGNOSIS — I47.20 VENTRICULAR TACHYCARDIA (HCC): ICD-10-CM

## 2021-08-02 DIAGNOSIS — I42.8 NICM (NONISCHEMIC CARDIOMYOPATHY) (HCC): Chronic | ICD-10-CM

## 2021-08-02 DIAGNOSIS — I34.0 MODERATE MITRAL REGURGITATION: Chronic | ICD-10-CM

## 2021-08-02 DIAGNOSIS — I10 ESSENTIAL HYPERTENSION: ICD-10-CM

## 2021-08-02 DIAGNOSIS — I50.20 HFREF (HEART FAILURE WITH REDUCED EJECTION FRACTION) (HCC): Chronic | ICD-10-CM

## 2021-08-02 DIAGNOSIS — I25.118 CORONARY ARTERY DISEASE OF NATIVE ARTERY OF NATIVE HEART WITH STABLE ANGINA PECTORIS (HCC): Primary | Chronic | ICD-10-CM

## 2021-08-02 DIAGNOSIS — Z95.810 BIVENTRICULAR ICD (IMPLANTABLE CARDIOVERTER-DEFIBRILLATOR) IN PLACE: ICD-10-CM

## 2021-08-02 DIAGNOSIS — Z72.0 TOBACCO USE: Chronic | ICD-10-CM

## 2021-08-02 DIAGNOSIS — E78.5 HYPERLIPIDEMIA LDL GOAL <70: ICD-10-CM

## 2021-08-02 PROCEDURE — 99214 OFFICE O/P EST MOD 30 MIN: CPT | Performed by: INTERNAL MEDICINE

## 2021-08-02 PROCEDURE — 93282 PRGRMG EVAL IMPLANTABLE DFB: CPT | Performed by: INTERNAL MEDICINE

## 2021-08-02 RX ORDER — AMIODARONE HYDROCHLORIDE 200 MG/1
TABLET ORAL
Qty: 110 TABLET | Refills: 3 | Status: SHIPPED | OUTPATIENT
Start: 2021-08-02 | End: 2022-08-22 | Stop reason: SDUPTHER

## 2021-08-11 ENCOUNTER — TELEPHONE (OUTPATIENT)
Dept: CARDIOLOGY | Facility: CLINIC | Age: 46
End: 2021-08-11

## 2021-08-11 NOTE — TELEPHONE ENCOUNTER
Spoke with pt regarding stress test results, advised of upcoming apt with Dr. Butler and Dr. Shah. Pt expressed disappointment regarding stress results as he would like to go back to work. Advised to discuss this with RDS at his appt next Monday 8/16

## 2021-08-11 NOTE — TELEPHONE ENCOUNTER
----- Message from Jorge Butler MD sent at 8/11/2021  8:33 AM EDT -----  Please inform the patient of their test results. Results consistent with previous known heart attack, bypass or stents would not be helpful. Thank you.

## 2021-08-13 NOTE — PROGRESS NOTES
OFFICE VISIT  NOTE  [facility] CARDIOLOGY      Name: Sonido Newsome    Date: 2021  MRN:  [id number]  :  1975      REFERRING/PRIMARY PROVIDER:  Abi Granados PA-C    Chief Complaint   Patient presents with   • Coronary Artery Disease       HPI: Sonido Newsome is a 45 y.o. male who presents today for follow-up for CAD and systolic heart failure.  Myocardial infarction in  with  of the LAD noted by Dr. Ellison, EF less than 20%, single-chamber [place] [employer] ICD placed at that time.  ICD defibrillation 2021, beta-blocker added he cannot tolerate metoprolol for nonspecific reasons.  Return to the hospital on 2021 with ventricular tachycardia, too slow for ICD shock, therefore EMS defibrillated the patient, he underwent emergent cath by Dr. Ellison which showed no changes from  with persistent  of the LAD.  He was started on carvedilol and aspirin.  2 days after returning home he had another defibrillation for ventricular fibrillation, he stated he had not taken his beta-blocker yet that day.    Much improved after last visit, no shocks from his ICD.  He felt a little fatigued after starting Jardiance but now back to normal.  He is planning to start cardiac rehab soon.  Denies chest pain.  Stress MPI 2021 showed large anterior and distal inferior infarct, no ischemia, EF 23%.    Past Medical History:   Diagnosis Date   • CAD 2021   • H/O VT 2021   • HFrEF (EF <20%) 2021    TTE 3/12/2014:   EF estimated to be less than 20%  Moderate mitral regurgitation.    • Moderate MR 2021   • NICM s/p ICD () 2021   • Tobacco use 2021       Past Surgical History:   Procedure Laterality Date   • CARDIAC CATHETERIZATION N/A 2021    Procedure: Left Heart Cath;  Surgeon: Mega Ellison MD;  Location: [facility] CATH INVASIVE LOCATION;  Service: Cardiovascular;  Laterality: N/A;   • CARDIAC DEFIBRILLATOR PLACEMENT         Social  "History     Socioeconomic History   • Marital status:      Spouse name: Not on file   • Number of children: Not on file   • Years of education: Not on file   • Highest education level: Not on file   Tobacco Use   • Smoking status: Current Every Day Smoker     Packs/day: 1.00     Types: Cigarettes   • Smokeless tobacco: Never Used   Vaping Use   • Vaping Use: Never used   Substance and Sexual Activity   • Alcohol use: Yes     Comment: socially patient states \"1 shot per month if that\"   • Drug use: Defer   • Sexual activity: Defer       Family History   Problem Relation Age of Onset   • Coronary artery disease Mother    • Deep vein thrombosis Father    • Heart disease Father    • Other Sister         heart issues   • No Known Problems Maternal Grandmother    • No Known Problems Maternal Grandfather    • Stroke Paternal Grandmother    • No Known Problems Paternal Grandfather         ROS:   Constitutional no fever,  no weight loss   Skin no rash, no subcutaneous nodules   Otolaryngeal no difficulty swallowing   Cardiovascular See HPI   Pulmonary no cough, no sputum production   Gastrointestinal no constipation, no diarrhea   Genitourinary no dysuria, no hematuria   Hematologic no easy bruisability, no abnormal bleeding   Musculoskeletal no muscle pain   Neurologic no dizziness, no falls         No Known Allergies      Current Outpatient Medications:   •  amiodarone (PACERONE) 200 MG tablet, Take 1 tablet by mouth twice daily for ten days. Then take 1 tablet by mouth once daily thereafter., Disp: 110 tablet, Rfl: 3  •  aspirin 81 MG EC tablet, Take 1 tablet by mouth Daily., Disp: 30 tablet, Rfl: 6  •  carvedilol (COREG) 6.25 MG tablet, Take 2 tablets by mouth 2 (Two) Times a Day With Meals., Disp: 60 tablet, Rfl: 6  •  empagliflozin (Jardiance) 10 MG tablet tablet, Take 1 tablet by mouth Daily., Disp: 90 tablet, Rfl: 3  •  vitamin D (ERGOCALCIFEROL) 1.25 MG (42468 UT) capsule capsule, Take 1 capsule by mouth 1 " "(One) Time Per Week., Disp: 4 capsule, Rfl: 3  •  atorvastatin (LIPITOR) 20 MG tablet, Take 1 tablet by mouth Daily., Disp: 30 tablet, Rfl: 11  •  sacubitril-valsartan (Entresto) 24-26 MG tablet, Take 1 tablet by mouth 2 (Two) Times a Day., Disp: 180 tablet, Rfl: 3    Vitals:    08/16/21 0954   BP: 110/68   BP Location: Right arm   Patient Position: Sitting   Pulse: 68   SpO2: 98%   Weight: 83.5 kg (184 lb)   Height: 177.8 cm (70\")     Body mass index is 26.4 kg/m².    PHYSICAL EXAM:    General Appearance:   · well developed  · well nourished  HENT:   · oropharynx moist  · lips not cyanotic  Neck:  · thyroid not enlarged  · supple  Respiratory:  · no respiratory distress  · normal breath sounds  · no rales  Cardiovascular:  · no jugular venous distention  · regular rhythm  · apical impulse normal  · S1 normal, S2 normal  · no S3, no S4   · no murmur  · no rub, no thrill  · carotid pulses normal; no bruit  · pedal pulses normal  · lower extremity edema: none    Gastrointestinal:   · bowel sounds normal  · non-tender  · no hepatomegaly, no splenomegaly  Musculoskeletal:  · no clubbing of fingers.   · normocephalic, head atraumatic  Skin:   · warm, dry  Psychiatric:  · judgement and insight appropriate  · normal mood and affect    RESULTS:     ECG 12 Lead    Date/Time: 8/16/2021 10:48 AM  Performed by: Jorge Butler MD  Authorized by: Jorge Butler MD   Comparison: compared with previous ECG from 7/9/2021  Similar to previous ECG  Rhythm: sinus rhythm  Rate: normal  BPM: 68  QRS axis: normal    Clinical impression: non-specific ECG  Comments:  ms, stable.              Results for orders placed during the hospital encounter of 07/08/21    Adult Transthoracic Echo Complete W/ Cont if Necessary Per Protocol    Interpretation Summary  · The left ventricular cavity is moderately dilated.  · Estimated left ventricular EF = 20% Left ventricular systolic function is severely decreased.  · Estimated right " ventricular systolic pressure from tricuspid regurgitation is mildly elevated (35-45 mmHg). Calculated right ventricular systolic pressure from tricuspid regurgitation is 40 mmHg.  · The right ventricular cavity is dilated.  · The right atrial cavity is mildly dilated.  · The findings are consistent with dilated cardiomyopathy.  · Left atrial volume is mildly increased.        Labs:  Lab Results   Component Value Date    CHOL 181 07/09/2021    TRIG 124 07/09/2021    HDL 32 (L) 07/09/2021     (H) 07/09/2021    AST 19 07/14/2021    ALT 19 07/14/2021     Lab Results   Component Value Date    HGBA1C 5.5 07/14/2021     No components found for: CREATINININE  eGFR Non  Am   Date Value Ref Range Status   07/14/2021 77 >59 mL/min/1.73 Final     eGFR Non  Amer   Date Value Ref Range Status   07/08/2021 93 >60 mL/min/1.73 Final   04/16/2021 76 >60 mL/min/1.73 Final       Most recent PCP note, imaging tests, and labs reviewed.    ASSESSMENT:  Problem List Items Addressed This Visit        Cardiac and Vasculature    NICM s/p ICD (2014) (Chronic)    Relevant Medications    sacubitril-valsartan (Entresto) 24-26 MG tablet    CAD with  of LAD - Primary (Chronic)    Overview     8/6/21 Stress: large-sized infarct in distal anterior, apex and distal inferior walls,  No significant ischemia noted, LVEF 23%  7/8/21 LHC: %  collaterals from right system, LCX luminal irregularities RCA luminal irregularities with collaterals to LAD  LV: LVEF severe mid and distal anterior wall apical distal inferior wall dyskinesis with a large scar ejection fraction approximately 20%               Relevant Medications    sacubitril-valsartan (Entresto) 24-26 MG tablet    Other Relevant Orders    Comprehensive Metabolic Panel    Magnesium    HFrEF (EF <20%) (Chronic)    Overview     7/8/21 Echo: EF less than 20%, moderate MR    TTE 3/12/2014:    · EF estimated to be less than 20%   · Moderate mitral regurgitation.            Relevant Medications    sacubitril-valsartan (Entresto) 24-26 MG tablet    Moderate MR (Chronic)    Relevant Medications    sacubitril-valsartan (Entresto) 24-26 MG tablet    Ventricular tachycardia (CMS/HCC)    Overview     C ICD         Relevant Medications    sacubitril-valsartan (Entresto) 24-26 MG tablet    Hyperlipidemia LDL goal <70    Relevant Medications    atorvastatin (LIPITOR) 20 MG tablet    Essential hypertension    Relevant Orders    Comprehensive Metabolic Panel    Magnesium       Tobacco    Tobacco use (Chronic)    Overview     Emphasis placed on smoking cessation.  Patient declines nicotine gum or patches at this time.               PLAN:    1.  Acute on chronic systolic heart failure, EF less than 20%:  Ischemic etiology with  of the LAD noted in 2014  Continue carvedilol at current dose  Continue Jardiance 10 mg daily  Check CMP and mag today, if renal function and potassium are stable we will start low-dose Entresto samples given.    No need for diuretic at this time, fairly euvolemic on exam.    Refer to EP has appointment in September, for consideration of BiV ICD, left bundle morphology, QRS greater than  120 ms    Lexiscan nuclear stress test large anterior infarct with no ischemia 8/2021.    2.  Recurrent ventricular tachycardia and ventricular fibrillation:  11 episodes of VT terminated with ATP over the past 3 weeks, 1 successful defibrillation for ventricular fibrillation.    Continue amiodarone 200 mg daily  EKG reviewed, check CMP today  Refer to electrophysiology    3.  Presents of single-chamber [employer] [place] [employer] ICD:  Refer to EP for consideration of upgrade to BiV  Interrogation today shows normal function with 11 episodes of ATP and one successful defibrillation  Battery life over 6 years    4.  CAD:   of the LAD, cath films reviewed from 7/8/2021 by Dr. Ellison   noted with faint right to left collaterals,  was noted in 2014  Continue aspirin  Add statin at next  visit  Lexisc nuclear stress test no significant ischemia noted       Return to clinic in 8 weeks, or sooner as needed.    Thank you for the opportunity to share in the care of your patient; please do not hesitate to call me with any questions.     Jorge Butler MD, [facility]  Office: [phone] [address] [place] [address]    08/16/21

## 2021-08-16 ENCOUNTER — OFFICE VISIT (OUTPATIENT)
Dept: CARDIOLOGY | Facility: CLINIC | Age: 46
End: 2021-08-16

## 2021-08-16 VITALS
DIASTOLIC BLOOD PRESSURE: 68 MMHG | WEIGHT: 184 LBS | HEIGHT: 70 IN | SYSTOLIC BLOOD PRESSURE: 110 MMHG | HEART RATE: 68 BPM | OXYGEN SATURATION: 98 % | BODY MASS INDEX: 26.34 KG/M2

## 2021-08-16 DIAGNOSIS — I34.0 MODERATE MITRAL REGURGITATION: Chronic | ICD-10-CM

## 2021-08-16 DIAGNOSIS — I47.20 VENTRICULAR TACHYCARDIA (HCC): ICD-10-CM

## 2021-08-16 DIAGNOSIS — Z72.0 TOBACCO USE: Chronic | ICD-10-CM

## 2021-08-16 DIAGNOSIS — I50.20 HFREF (HEART FAILURE WITH REDUCED EJECTION FRACTION) (HCC): Chronic | ICD-10-CM

## 2021-08-16 DIAGNOSIS — I25.118 CORONARY ARTERY DISEASE OF NATIVE ARTERY OF NATIVE HEART WITH STABLE ANGINA PECTORIS (HCC): Primary | Chronic | ICD-10-CM

## 2021-08-16 DIAGNOSIS — I10 ESSENTIAL HYPERTENSION: ICD-10-CM

## 2021-08-16 DIAGNOSIS — I42.8 NICM (NONISCHEMIC CARDIOMYOPATHY) (HCC): Chronic | ICD-10-CM

## 2021-08-16 DIAGNOSIS — E78.5 HYPERLIPIDEMIA LDL GOAL <70: ICD-10-CM

## 2021-08-16 PROCEDURE — 93000 ELECTROCARDIOGRAM COMPLETE: CPT | Performed by: INTERNAL MEDICINE

## 2021-08-16 PROCEDURE — 99214 OFFICE O/P EST MOD 30 MIN: CPT | Performed by: INTERNAL MEDICINE

## 2021-08-16 RX ORDER — SACUBITRIL AND VALSARTAN 24; 26 MG/1; MG/1
1 TABLET, FILM COATED ORAL 2 TIMES DAILY
Qty: 180 TABLET | Refills: 3 | Status: SHIPPED | OUTPATIENT
Start: 2021-08-16 | End: 2021-10-13

## 2021-08-16 RX ORDER — ATORVASTATIN CALCIUM 20 MG/1
20 TABLET, FILM COATED ORAL DAILY
Qty: 30 TABLET | Refills: 11 | Status: SHIPPED | OUTPATIENT
Start: 2021-08-16 | End: 2021-10-13

## 2021-08-17 LAB
ALBUMIN SERPL-MCNC: 4.8 G/DL (ref 4–5)
ALBUMIN/GLOB SERPL: 2.1 {RATIO} (ref 1.2–2.2)
ALP SERPL-CCNC: 91 IU/L (ref 48–121)
ALT SERPL-CCNC: 24 IU/L (ref 0–44)
AST SERPL-CCNC: 21 IU/L (ref 0–40)
BILIRUB SERPL-MCNC: 0.4 MG/DL (ref 0–1.2)
BUN SERPL-MCNC: 13 MG/DL (ref 6–24)
BUN/CREAT SERPL: 13 (ref 9–20)
CALCIUM SERPL-MCNC: 9.7 MG/DL (ref 8.7–10.2)
CHLORIDE SERPL-SCNC: 101 MMOL/L (ref 96–106)
CO2 SERPL-SCNC: 25 MMOL/L (ref 20–29)
CREAT SERPL-MCNC: 1.02 MG/DL (ref 0.76–1.27)
GLOBULIN SER CALC-MCNC: 2.3 G/DL (ref 1.5–4.5)
GLUCOSE SERPL-MCNC: 62 MG/DL (ref 65–99)
MAGNESIUM SERPL-MCNC: 2.5 MG/DL (ref 1.6–2.3)
POTASSIUM SERPL-SCNC: 5.9 MMOL/L (ref 3.5–5.2)
PROT SERPL-MCNC: 7.1 G/DL (ref 6–8.5)
SODIUM SERPL-SCNC: 142 MMOL/L (ref 134–144)

## 2021-08-18 ENCOUNTER — TELEPHONE (OUTPATIENT)
Dept: CARDIOLOGY | Facility: CLINIC | Age: 46
End: 2021-08-18

## 2021-08-18 DIAGNOSIS — I10 ESSENTIAL HYPERTENSION: Primary | ICD-10-CM

## 2021-08-18 DIAGNOSIS — E87.5 HYPERKALEMIA: ICD-10-CM

## 2021-08-18 RX ORDER — FUROSEMIDE 40 MG/1
40 TABLET ORAL DAILY
Qty: 90 TABLET | Refills: 3 | Status: SHIPPED | OUTPATIENT
Start: 2021-08-18 | End: 2021-10-13

## 2021-08-18 NOTE — TELEPHONE ENCOUNTER
Spoke with pt regarding labs and RDS recommendations above. He is amendable to plan. Advised I will try to find a nephrologist near him in [place] for the referral, no further questions at this time.

## 2021-08-18 NOTE — PROGRESS NOTES
Please inform the patient of their test results.  His potassium is too high, tell him not to start Entresto and instead start Lasix 40 mg daily with repeat BMP in 1 week, and refer to nephrology for hyperkalemia. Thank you.

## 2021-08-18 NOTE — TELEPHONE ENCOUNTER
----- Message from Jorge Butler MD sent at 8/18/2021  7:51 AM EDT -----  Please inform the patient of their test results.  His potassium is too high, tell him not to start Entresto and instead start Lasix 40 mg daily with repeat BMP in 1 week, and refer to nephrology for hyperkalemia. Thank you.

## 2021-08-19 ENCOUNTER — OFFICE VISIT (OUTPATIENT)
Dept: INTERNAL MEDICINE | Facility: CLINIC | Age: 46
End: 2021-08-19

## 2021-08-19 VITALS
OXYGEN SATURATION: 98 % | WEIGHT: 186.6 LBS | DIASTOLIC BLOOD PRESSURE: 80 MMHG | BODY MASS INDEX: 26.71 KG/M2 | HEART RATE: 66 BPM | SYSTOLIC BLOOD PRESSURE: 120 MMHG | TEMPERATURE: 98.2 F | HEIGHT: 70 IN

## 2021-08-19 DIAGNOSIS — I42.8 NICM (NONISCHEMIC CARDIOMYOPATHY) (HCC): Chronic | ICD-10-CM

## 2021-08-19 DIAGNOSIS — Z86.79 HISTORY OF VENTRICULAR TACHYCARDIA: ICD-10-CM

## 2021-08-19 DIAGNOSIS — I10 ESSENTIAL HYPERTENSION: ICD-10-CM

## 2021-08-19 DIAGNOSIS — I50.20 HFREF (HEART FAILURE WITH REDUCED EJECTION FRACTION) (HCC): Chronic | ICD-10-CM

## 2021-08-19 DIAGNOSIS — Z72.0 TOBACCO USE: Chronic | ICD-10-CM

## 2021-08-19 DIAGNOSIS — I25.118 CORONARY ARTERY DISEASE OF NATIVE ARTERY OF NATIVE HEART WITH STABLE ANGINA PECTORIS (HCC): Primary | Chronic | ICD-10-CM

## 2021-08-19 DIAGNOSIS — E78.5 HYPERLIPIDEMIA LDL GOAL <70: ICD-10-CM

## 2021-08-19 PROCEDURE — 99214 OFFICE O/P EST MOD 30 MIN: CPT | Performed by: PHYSICIAN ASSISTANT

## 2021-08-19 NOTE — PROGRESS NOTES
Patient Care Team:  Abi Granados PA-C as PCP - General (Physician Assistant)    Chief Complaint::   Chief Complaint   Patient presents with   • Follow-up        Subjective     HPI  Kaiser is a 45 year old male with history of an ICM status post ICD in 2014, CAD with  of LAD, hypertension, hyperlipidemia, ventricular tachycardia, who presents for follow-up.  He has established care with Dr. Butler cardiologist and has upcoming consultation with Dr. Shah.  Lexiscan nuclear stress test showed large anterior infarct with no ischemia 8/20/2021.  He has been referred to electrophysiology and has appointment in September for consideration of a Biv ICD.  Recent CMP and magnesium showed elevated potassium levels.  Per Dr. Butler, he was instructed yesterday to start furosemide 40 mg tablets daily.  Patient has yet to pick this up due to cost.  He was continued on carvedilol and Jardiance.  Started cardiac rehab yesterday.  Had some fatigue and discomfort towards the end on the bicycle.  He has picked up his meals and is trying to eat small meals throughout the day.  Gradually going back to work, now working a little over half a day.  Overall, he has had increasing stress.  Has increased tobacco use also.  He denies chest pain, body aches, headaches, or fever.          The following portions of the patient's history were reviewed and updated as appropriate: active problem list, medication list, allergies, family history, social history    Review of Systems:   Review of Systems   Constitutional: Positive for fatigue. Negative for activity change, appetite change, diaphoresis, unexpected weight gain and unexpected weight loss.   HENT: Negative for hearing loss.    Eyes: Negative for visual disturbance.   Respiratory: Positive for shortness of breath. Negative for chest tightness.    Cardiovascular: Negative for chest pain, palpitations and leg swelling.   Gastrointestinal: Negative for abdominal pain, blood in  "stool, GERD and indigestion.   Endocrine: Negative for cold intolerance and heat intolerance.   Genitourinary: Negative for dysuria and hematuria.   Musculoskeletal: Negative for arthralgias and myalgias.   Skin: Negative for skin lesions.   Neurological: Negative for tremors, seizures, syncope, speech difficulty, weakness, headache, memory problem and confusion.   Hematological: Does not bruise/bleed easily.   Psychiatric/Behavioral: Negative for sleep disturbance and depressed mood. The patient is not nervous/anxious.        Vital Signs  Vitals:    08/19/21 1020   BP: 120/80   BP Location: Left arm   Patient Position: Sitting   Cuff Size: Adult   Pulse: 66   Temp: 98.2 °F (36.8 °C)   TempSrc: Temporal   SpO2: 98%   Weight: 84.6 kg (186 lb 9.6 oz)   Height: 177.8 cm (70\")   PainSc: 0-No pain     Body mass index is 26.77 kg/m².    Labs  Orders Only on 08/16/2021   Component Date Value Ref Range Status   • Glucose 08/16/2021 62* 65 - 99 mg/dL Final   • BUN 08/16/2021 13  6 - 24 mg/dL Final   • Creatinine 08/16/2021 1.02  0.76 - 1.27 mg/dL Final   • eGFR Non  Am 08/16/2021 90  >59 mL/min/1.73 Final   • eGFR African Am 08/16/2021 104  >59 mL/min/1.73 Final    Comment: **[facility] currently reports eGFR in compliance with the current**    recommendations of the National Kidney Foundation. [facility] will    update reporting as new guidelines are published from the NKF-ASN    Task force.     • BUN/Creatinine Ratio 08/16/2021 13  9 - 20 Final   • Sodium 08/16/2021 142  134 - 144 mmol/L Final   • Potassium 08/16/2021 5.9* 3.5 - 5.2 mmol/L Final   • Chloride 08/16/2021 101  96 - 106 mmol/L Final   • Total CO2 08/16/2021 25  20 - 29 mmol/L Final   • Calcium 08/16/2021 9.7  8.7 - 10.2 mg/dL Final   • Total Protein 08/16/2021 7.1  6.0 - 8.5 g/dL Final   • Albumin 08/16/2021 4.8  4.0 - 5.0 g/dL Final   • Globulin 08/16/2021 2.3  1.5 - 4.5 g/dL Final   • A/G Ratio 08/16/2021 2.1  1.2 - 2.2 Final   • Total Bilirubin 08/16/2021 " 0.4  0.0 - 1.2 mg/dL Final   • Alkaline Phosphatase 08/16/2021 91  48 - 121 IU/L Final   • AST (SGOT) 08/16/2021 21  0 - 40 IU/L Final   • ALT (SGPT) 08/16/2021 24  0 - 44 IU/L Final   • Magnesium 08/16/2021 2.5* 1.6 - 2.3 mg/dL Final   Ancillary Procedure on 08/06/2021   Component Date Value Ref Range Status   • BH CV STRESS PROTOCOL 1 08/06/2021 Pharmacologic   Final   • Stage 1 08/06/2021 1   Final   • HR Stage 1 08/06/2021 91   Final   • BP Stage 1 08/06/2021 108/68   Final   • O2 Stage 1 08/06/2021 96   Final   • Duration Min Stage 1 08/06/2021 1   Final   • Duration Sec Stage 1 08/06/2021 4   Final   • Stress Dose Regadenoson Stage 1 08/06/2021 0.4   Final   • Stress Comments Stage 1 08/06/2021 10 sec bolus injection   Final   • Baseline HR 08/06/2021 55  bpm Final   • Baseline BP 08/06/2021 124/74  mmHg Final   • Peak HR 08/06/2021 91  bpm Final   • Percent Max Pred HR 08/06/2021 51.41  % Final   • Percent Target HR 08/06/2021 60  % Final   • Peak BP 08/06/2021 108/68  mmHg Final   • Recovery HR 08/06/2021 75  bpm Final   • Recovery BP 08/06/2021 116/72  mmHg Final   • Target HR (85%) 08/06/2021 150  bpm Final   • Max. Pred. HR (100%) 08/06/2021 177  bpm Final   • BH CV REST NUCLEAR ISOTOPE DOSE 08/06/2021 11.0  mCi Final   • BH CV STRESS NUCLEAR ISOTOPE DOSE 08/06/2021 30.6  mCi Final   • Nuc Stress EF 08/06/2021 23  % Final   Office Visit on 07/14/2021   Component Date Value Ref Range Status   • Hemoglobin A1C 07/14/2021 5.5  4.8 - 5.6 % Final    Comment:          Prediabetes: 5.7 - 6.4           Diabetes: >6.4           Glycemic control for adults with diabetes: <7.0     • SARS-COV-2 ANTIBODIES 07/14/2021 Negative  Negative Final    Comment: This sample does not contain detectable SARS-CoV-2 antibodies. This  negative result does not rule out SARS-CoV-2 infection. Correlation  with epidemiologic risk factors and other clinical and laboratory  findings is recommended. Serologic results should not be used as  the  sole basis to diagnose or exclude recent SARS-CoV-2 infection.  This assay will not detect antibodies induced by the currently  available SARS-CoV-2 vaccines. The current vaccines elicit  antibodies specific to the viral spike protein. [employer] offers  two test codes that detect viral spike-specific antibodies:  812264 SARS-CoV-2 Semi-Quantitative Total Antibody, Rafi and  553522 SARS-CoV-2 Antibody, IgG, Rafi (Qualitative).  Positive results with this SARS-CoV-2 Antibodies, Nucleocapsid  assay suggest recent or previous natural infection with  SARS-CoV-2.     • 25 Hydroxy, Vitamin D 07/14/2021 30.2  30.0 - 100.0 ng/mL Final    Comment: Vitamin D deficiency has been defined by the [place] of  Medicine and an Endocrine Society practice guideline as a  level of serum 25-OH vitamin D less than 20 ng/mL (1,2).  The Endocrine Society went on to further define vitamin D  insufficiency as a level between 21 and 29 ng/mL (2).  1. IOM ([place] of Medicine). 2010. Dietary reference     intakes for calcium and D. [place]: The     National Academies Press.  2. Rowena MF, Klaus SANTAMARIA, Karol PACHECO, et al.     Evaluation, treatment, and prevention of vitamin D     deficiency: an Endocrine Society clinical practice     guideline. JCEM. 2011 Jul; 96(7):1911-30.     • Vitamin B-12 07/14/2021 811  232 - 1,245 pg/mL Final   • PSA 07/14/2021 0.4  0.0 - 4.0 ng/mL Final    Comment: Roche ECLIA methodology.  According to the American Urological Association, Serum PSA should  decrease and remain at undetectable levels after radical  prostatectomy. The AUA defines biochemical recurrence as an initial  PSA value 0.2 ng/mL or greater followed by a subsequent confirmatory  PSA value 0.2 ng/mL or greater.  Values obtained with different assay methods or kits cannot be used  interchangeably. Results cannot be interpreted as absolute evidence  of the presence or absence of malignant disease.     • Free T4 07/14/2021 1.84*  0.82 - 1.77 ng/dL Final   • TSH 07/14/2021 2.870  0.450 - 4.500 uIU/mL Final   • Glucose 07/14/2021 96  65 - 99 mg/dL Final   • BUN 07/14/2021 13  6 - 24 mg/dL Final   • Creatinine 07/14/2021 1.16  0.76 - 1.27 mg/dL Final   • eGFR Non  Am 07/14/2021 77  >59 mL/min/1.73 Final   • eGFR African Am 07/14/2021 89  >59 mL/min/1.73 Final    Comment: **[facility] currently reports eGFR in compliance with the current**    recommendations of the National Kidney Foundation. [facility] will    update reporting as new guidelines are published from the NKF-ASN    Task force.     • BUN/Creatinine Ratio 07/14/2021 11  9 - 20 Final   • Sodium 07/14/2021 138  134 - 144 mmol/L Final   • Potassium 07/14/2021 5.4* 3.5 - 5.2 mmol/L Final   • Chloride 07/14/2021 98  96 - 106 mmol/L Final   • Total CO2 07/14/2021 23  20 - 29 mmol/L Final   • Calcium 07/14/2021 10.2  8.7 - 10.2 mg/dL Final   • Total Protein 07/14/2021 7.5  6.0 - 8.5 g/dL Final   • Albumin 07/14/2021 4.8  4.0 - 5.0 g/dL Final   • Globulin 07/14/2021 2.7  1.5 - 4.5 g/dL Final   • A/G Ratio 07/14/2021 1.8  1.2 - 2.2 Final   • Total Bilirubin 07/14/2021 0.8  0.0 - 1.2 mg/dL Final   • Alkaline Phosphatase 07/14/2021 112  48 - 121 IU/L Final   • AST (SGOT) 07/14/2021 19  0 - 40 IU/L Final   • ALT (SGPT) 07/14/2021 19  0 - 44 IU/L Final   • WBC 07/14/2021 12.2* 3.4 - 10.8 x10E3/uL Final   • RBC 07/14/2021 5.85* 4.14 - 5.80 x10E6/uL Final   • Hemoglobin 07/14/2021 16.6  13.0 - 17.7 g/dL Final   • Hematocrit 07/14/2021 49.8  37.5 - 51.0 % Final   • MCV 07/14/2021 85  79 - 97 fL Final   • MCH 07/14/2021 28.4  26.6 - 33.0 pg Final   • MCHC 07/14/2021 33.3  31.5 - 35.7 g/dL Final   • RDW 07/14/2021 18.4* 11.6 - 15.4 % Final   • Platelets 07/14/2021 610* 150 - 450 x10E3/uL Final   • Neutrophil Rel % 07/14/2021 74  Not Estab. % Final   • Lymphocyte Rel % 07/14/2021 14  Not Estab. % Final   • Monocyte Rel % 07/14/2021 7  Not Estab. % Final   • Eosinophil Rel % 07/14/2021 2  Not Estab. %  Final   • Basophil Rel % 07/14/2021 2  Not Estab. % Final   • Neutrophils Absolute 07/14/2021 9.1* 1.4 - 7.0 x10E3/uL Final   • Lymphocytes Absolute 07/14/2021 1.7  0.7 - 3.1 x10E3/uL Final   • Monocytes Absolute 07/14/2021 0.8  0.1 - 0.9 x10E3/uL Final   • Eosinophils Absolute 07/14/2021 0.3  0.0 - 0.4 x10E3/uL Final   • Basophils Absolute 07/14/2021 0.2  0.0 - 0.2 x10E3/uL Final   • Immature Granulocyte Rel % 07/14/2021 1  Not Estab. % Final   • Immature Grans Absolute 07/14/2021 0.1  0.0 - 0.1 x10E3/uL Final   • Please note 07/14/2021 Comment   Final    Comment: The date and/or time of collection was not indicated on the  requisition as required by state and federal law.  The date of  receipt of the specimen was used as the collection date if not  supplied.     Admission on 07/08/2021, Discharged on 07/09/2021   Component Date Value Ref Range Status   • COVID19 07/08/2021 Not Detected  Not Detected - Ref. Range Final   • QT Interval 07/08/2021 390  ms Final   • QTC Interval 07/08/2021 482  ms Final   • Glucose 07/08/2021 115* 65 - 99 mg/dL Final   • BUN 07/08/2021 8  6 - 20 mg/dL Final   • Creatinine 07/08/2021 0.89  0.76 - 1.27 mg/dL Final   • Sodium 07/08/2021 140  136 - 145 mmol/L Final   • Potassium 07/08/2021 3.5  3.5 - 5.2 mmol/L Final   • Chloride 07/08/2021 105  98 - 107 mmol/L Final   • CO2 07/08/2021 22.0  22.0 - 29.0 mmol/L Final   • Calcium 07/08/2021 8.5* 8.6 - 10.5 mg/dL Final   • eGFR Non  Amer 07/08/2021 93  >60 mL/min/1.73 Final   • BUN/Creatinine Ratio 07/08/2021 9.0  7.0 - 25.0 Final   • Anion Gap 07/08/2021 13.0  5.0 - 15.0 mmol/L Final   • WBC 07/08/2021 12.72* 3.40 - 10.80 10*3/mm3 Final   • RBC 07/08/2021 5.00  4.14 - 5.80 10*6/mm3 Final   • Hemoglobin 07/08/2021 14.3  13.0 - 17.7 g/dL Final   • Hematocrit 07/08/2021 44.9  37.5 - 51.0 % Final   • MCV 07/08/2021 89.8  79.0 - 97.0 fL Final   • MCH 07/08/2021 28.6  26.6 - 33.0 pg Final   • MCHC 07/08/2021 31.8  31.5 - 35.7 g/dL Final    • RDW 07/08/2021 17.7* 12.3 - 15.4 % Final   • RDW-SD 07/08/2021 57.6* 37.0 - 54.0 fl Final   • MPV 07/08/2021 10.9  6.0 - 12.0 fL Final   • Platelets 07/08/2021 508* 140 - 450 10*3/mm3 Final   • BSA 07/08/2021 2.1  m^2 Final   • RVIDd 07/08/2021 2.0  cm Final   • IVSd 07/08/2021 0.88  cm Final   • LVIDd 07/08/2021 6.3  cm Final   • LVIDs 07/08/2021 5.7  cm Final   • LVPWd 07/08/2021 0.92  cm Final   • IVS/LVPW 07/08/2021 0.96   Final   • FS 07/08/2021 9.7  % Final   • EDV(Teich) 07/08/2021 200.5  ml Final   • ESV(Teich) 07/08/2021 159.0  ml Final   • EF(Teich) 07/08/2021 20.7  % Final   • EDV(cubed) 07/08/2021 249.0  ml Final   • ESV(cubed) 07/08/2021 183.6  ml Final   • EF(cubed) 07/08/2021 26.3  % Final   • LV mass(C)d 07/08/2021 234.0  grams Final   • LV mass(C)dI 07/08/2021 113.1  grams/m^2 Final   • SV(Teich) 07/08/2021 41.5  ml Final   • SI(Teich) 07/08/2021 20.1  ml/m^2 Final   • SV(cubed) 07/08/2021 65.4  ml Final   • SI(cubed) 07/08/2021 31.6  ml/m^2 Final   • Ao root diam 07/08/2021 3.4  cm Final   • Ao root area 07/08/2021 9.1  cm^2 Final   • LA dimension 07/08/2021 4.2  cm Final   • asc Aorta Diam 07/08/2021 3.3  cm Final   • LA/Ao 07/08/2021 1.2   Final   • LVOT diam 07/08/2021 2.0  cm Final   • LVOT area 07/08/2021 3.1  cm^2 Final   • LVOT area(traced) 07/08/2021 3.1  cm^2 Final   • LAd major 07/08/2021 5.6  cm Final   • LVLd ap4 07/08/2021 10.8  cm Final   • EDV(MOD-sp4) 07/08/2021 235.0  ml Final   • LVLs ap4 07/08/2021 10.5  cm Final   • ESV(MOD-sp4) 07/08/2021 187.0  ml Final   • EF(MOD-sp4) 07/08/2021 20.4  % Final   • LVLd ap2 07/08/2021 10.4  cm Final   • EDV(MOD-sp2) 07/08/2021 227.0  ml Final   • LVLs ap2 07/08/2021 10.5  cm Final   • ESV(MOD-sp2) 07/08/2021 177.0  ml Final   • EF(MOD-sp2) 07/08/2021 22.0  % Final   • LA volume 07/08/2021 86.0  ml Final   • EF(MOD-bp) 07/08/2021 22.0  % Final   • SV(MOD-sp4) 07/08/2021 48.0  ml Final   • SI(MOD-sp4) 07/08/2021 23.2  ml/m^2 Final   •  SV(MOD-sp2) 07/08/2021 50.0  ml Final   • SI(MOD-sp2) 07/08/2021 24.2  ml/m^2 Final   • Ao root area (BSA corrected) 07/08/2021 1.6   Final   • LV [place] Vol (BSA corrected) 07/08/2021 113.6  ml/m^2 Final   • LV Sys Vol (BSA corrected) 07/08/2021 90.4  ml/m^2 Final   • LA Volume Index 07/08/2021 41.6  ml/m^2 Final   • MV E max ashley 07/08/2021 100.7  cm/sec Final   • MV A max ashley 07/08/2021 50.8  cm/sec Final   • MV E/A 07/08/2021 2.0   Final   • LV IVRT 07/08/2021 0.07  sec Final   • MV P1/2t max ashley 07/08/2021 98.0  cm/sec Final   • MV P1/2t 07/08/2021 89.4  msec Final   • MVA(P1/2t) 07/08/2021 2.5  cm^2 Final   • MV dec slope 07/08/2021 321.3  cm/sec^2 Final   • MV dec time 07/08/2021 0.18  sec Final   • Ao pk ashley 07/08/2021 82.0  cm/sec Final   • Ao max PG 07/08/2021 2.7  mmHg Final   • Ao max PG (full) 07/08/2021 -0.26  mmHg Final   • Ao V2 mean 07/08/2021 65.4  cm/sec Final   • Ao mean PG 07/08/2021 1.9  mmHg Final   • Ao mean PG (full) 07/08/2021 0.26  mmHg Final   • Ao V2 VTI 07/08/2021 16.6  cm Final   • ANTOLIN(I,A) 07/08/2021 2.9  cm^2 Final   • ANTOLIN(I,D) 07/08/2021 2.9  cm^2 Final   • ANTOLIN(V,A) 07/08/2021 3.2  cm^2 Final   • ANTOLIN(V,D) 07/08/2021 3.2  cm^2 Final   • LV V1 max PG 07/08/2021 3.0  mmHg Final   • LV V1 mean PG 07/08/2021 1.7  mmHg Final   • LV V1 max 07/08/2021 85.9  cm/sec Final   • LV V1 mean 07/08/2021 58.5  cm/sec Final   • LV V1 VTI 07/08/2021 15.7  cm Final   • SV(Ao) 07/08/2021 151.2  ml Final   • SI(Ao) 07/08/2021 73.1  ml/m^2 Final   • SV(LVOT) 07/08/2021 48.1  ml Final   • SI(LVOT) 07/08/2021 23.3  ml/m^2 Final   • PA acc slope 07/08/2021 377.9  cm/sec^2 Final   • PA acc time 07/08/2021 0.16  sec Final   • PI end-d ashley 07/08/2021 112.7  cm/sec Final   • TR max ashley 07/08/2021 247.3  cm/sec Final   • TR max PG 07/08/2021 25.0  mmHg Final   • RVSP(TR) 07/08/2021 40  mmHg Final   • RAP systole 07/08/2021 15  mmHg Final   • PA pr(Accel) 07/08/2021 6.1  mmHg Final   • MVA P1/2T LCG 07/08/2021 2.2   cm^2 Final   • Lat E/e'  07/08/2021 13.4   Final   • Med E/e' 07/08/2021 12.0   Final   • Lat Peak E' Gilson 07/08/2021 7.3  cm/sec Final   • Med Peak E' Gilson 07/08/2021 8.2  cm/sec Final   • BH CV ECHO LOLLY - BZI_BMI 07/08/2021 28.1  kilograms/m^2 Final   • BH CV ECHO LOLLY - BSA([place]OCK) 07/08/2021 2.1  m^2 Final   • BH CV ECHO LOLLY - BZI_METRIC_WEIGHT 07/08/2021 88.9  kg Final   • BH CV ECHO LOLLY - BZI_METRIC_HEIGHT 07/08/2021 177.8  cm Final   • Avg E/e' ratio 07/08/2021 12.99   Final   • BH CV VAS BP RIGHT ARM 07/08/2021 113/72  mmHg Final   • RV S' 07/08/2021 7.46  cm/sec Final   • RV Base 07/08/2021 4.50  cm Final   • RV Length 07/08/2021 9.30  cm Final   • RV Mid 07/08/2021 3.50  cm Final   • Ascending aorta 07/08/2021 3.3  cm Final   • TAPSE (>1.6) 07/08/2021 1.70  cm Final   • Echo EF Estimated 07/08/2021 20  % Final   • Total Cholesterol 07/09/2021 181  0 - 200 mg/dL Final   • Triglycerides 07/09/2021 124  0 - 150 mg/dL Final   • HDL Cholesterol 07/09/2021 32* 40 - 60 mg/dL Final   • LDL Cholesterol  07/09/2021 126* 0 - 100 mg/dL Final   • VLDL Cholesterol 07/09/2021 23  5 - 40 mg/dL Final   • LDL/HDL Ratio 07/09/2021 3.88   Final   • Potassium 07/09/2021 4.7  3.5 - 5.2 mmol/L Final       Imaging  No radiology results for the last 30 days.      Current Outpatient Medications:   •  amiodarone (PACERONE) 200 MG tablet, Take 1 tablet by mouth twice daily for ten days. Then take 1 tablet by mouth once daily thereafter., Disp: 110 tablet, Rfl: 3  •  aspirin 81 MG EC tablet, Take 1 tablet by mouth Daily., Disp: 30 tablet, Rfl: 6  •  carvedilol (COREG) 6.25 MG tablet, Take 2 tablets by mouth 2 (Two) Times a Day With Meals., Disp: 60 tablet, Rfl: 6  •  empagliflozin (Jardiance) 10 MG tablet tablet, Take 1 tablet by mouth Daily., Disp: 90 tablet, Rfl: 3  •  vitamin D (ERGOCALCIFEROL) 1.25 MG (59137 UT) capsule capsule, Take 1 capsule by mouth 1 (One) Time Per Week., Disp: 4 capsule, Rfl: 3  •  atorvastatin (LIPITOR) 20  MG tablet, Take 1 tablet by mouth Daily., Disp: 30 tablet, Rfl: 11  •  furosemide (LASIX) 40 MG tablet, Take 1 tablet by mouth Daily., Disp: 90 tablet, Rfl: 3  •  sacubitril-valsartan (Entresto) 24-26 MG tablet, Take 1 tablet by mouth 2 (Two) Times a Day., Disp: 180 tablet, Rfl: 3    Physical Exam:    Physical Exam  Vitals reviewed.   Constitutional:       Appearance: Normal appearance. He is well-developed.   HENT:      Head: Normocephalic and atraumatic.      Right Ear: Hearing, tympanic membrane, ear canal and external ear normal.      Left Ear: Hearing, tympanic membrane, ear canal and external ear normal.      Nose: Nose normal.      Mouth/Throat:      Mouth: Mucous membranes are moist.      Pharynx: Oropharynx is clear. Uvula midline.   Eyes:      General: Lids are normal.      Conjunctiva/sclera: Conjunctivae normal.      Pupils: Pupils are equal, round, and reactive to light.   Cardiovascular:      Rate and Rhythm: Normal rate and regular rhythm.      Pulses: Normal pulses.      Heart sounds: Normal heart sounds.   Pulmonary:      Effort: Pulmonary effort is normal.      Breath sounds: Normal breath sounds.   Abdominal:      General: Bowel sounds are normal.      Palpations: Abdomen is soft.   Musculoskeletal:         General: Normal range of motion.      Cervical back: Full passive range of motion without pain, normal range of motion and neck supple.   Skin:     General: Skin is warm and dry.   Neurological:      General: No focal deficit present.      Mental Status: He is alert and oriented to person, place, and time. Mental status is at baseline.      Deep Tendon Reflexes: Reflexes are normal and symmetric.   Psychiatric:         Mood and Affect: Mood normal.         Speech: Speech normal.         Behavior: Behavior normal.         Thought Content: Thought content normal.         Judgment: Judgment normal.         Procedures        Assessment/Plan   Problem List Items Addressed This Visit        Cardiac  and Vasculature    NICM s/p ICD (2014) (Chronic)    Relevant Medications    carvedilol (COREG) 6.25 MG tablet    sacubitril-valsartan (Entresto) 24-26 MG tablet    CAD with  of LAD - Primary (Chronic)    Overview     8/6/21 Stress: large-sized infarct in distal anterior, apex and distal inferior walls,  No significant ischemia noted, LVEF 23%  7/8/21 LHC: %  collaterals from right system, LCX luminal irregularities RCA luminal irregularities with collaterals to LAD  LV: LVEF severe mid and distal anterior wall apical distal inferior wall dyskinesis with a large scar ejection fraction approximately 20%               Relevant Medications    carvedilol (COREG) 6.25 MG tablet    sacubitril-valsartan (Entresto) 24-26 MG tablet    HFrEF (EF <20%) (Chronic)    Overview     7/8/21 Echo: EF less than 20%, moderate MR    TTE 3/12/2014:    · EF estimated to be less than 20%   · Moderate mitral regurgitation.           Relevant Medications    carvedilol (COREG) 6.25 MG tablet    sacubitril-valsartan (Entresto) 24-26 MG tablet    H/O VT    Overview     Upcoming appointment with electrophysiologist in September.         Hyperlipidemia LDL goal <70    Overview     Patient reports he has discontinued atorvastatin for now.  He is advised to continue a low-fat, low-cholesterol diet.         Relevant Medications    atorvastatin (LIPITOR) 20 MG tablet    Essential hypertension    Overview     120/80 in the office today.  Continue medications as directed.  Stop smoking.         Relevant Medications    carvedilol (COREG) 6.25 MG tablet    furosemide (LASIX) 40 MG tablet       Tobacco    Tobacco use (Chronic)    Overview     Emphasis placed on smoking cessation.  Patient declines nicotine gum or patches at this time.           Patient encouraged to  medication at [facility].  He has also been given a good Rx coupon card for future prescriptions.  Informed patient that furosemide will cost $4 at [facility].  He feels he can  afford this.  Emphasis today on getting his Covid vaccination.  I discussed the importance of being vaccinated.  Also discussed importance of cutting back on smoking.  Dr. Butler will refer to nephrology due to elevated potassium levels.    Return in about 3 months (around 11/19/2021) for Recheck.    Plan of care reviewed with patient at the conclusion of today's visit. Education was provided regarding diagnosis, management, and any prescribed or recommended OTC medications.Patient verbalizes understanding of and agreement with management plan.       Abi Granados PA-C    Please note that portions of this note were completed with a voice recognition program. Efforts were made to edit the dictations, but occasionally words are mistranscribed.

## 2021-10-12 NOTE — PROGRESS NOTES
OFFICE VISIT  NOTE  [facility] CARDIOLOGY      Name: Sonido Newsome    Date: 10/13/2021  MRN:  [id number]  :  1975      REFERRING/PRIMARY PROVIDER:  Abi Granados PA-C    Chief Complaint   Patient presents with   • Coronary Artery Disease       HPI: Sonido Newsome is a 45 y.o. male who presents today for follow-up for CAD and systolic heart failure.  Myocardial infarction in  with  of the LAD noted by Dr. Ellison, EF less than 20%, single-chamber [employer] ICD placed at that time.  ICD defibrillation 2021, beta-blocker added he cannot tolerate metoprolol for nonspecific reasons.  Return to the hospital on 2021 with ventricular tachycardia, too slow for ICD shock, therefore EMS defibrillated the patient, he underwent emergent cath by Dr. Ellison which showed no changes from  with persistent  of the LAD.  He was started on carvedilol and aspirin.  2 days after returning home he had another defibrillation for ventricular fibrillation, he stated he had not taken his beta-blocker yet that day.     Stress MPI 2021 showed large anterior and distal inferior infarct, no ischemia, EF 23%.  Hyperkalemia noted on labs, 5.9 on 2021.  Cannot start Entresto or spironolactone to that reason.  Tolerating Jardiance without difficulty.  Cannot tolerate Lasix due to passing out, and nightmares, symptoms went away after stopping it.  Participating cardiac rehab, he feels he is getting stronger.  Has upcoming follow-up with Dr. Shah 2021 to consider BiV upgrade.    Past Medical History:   Diagnosis Date   • CAD 2021   • H/O VT 2021   • HFrEF (EF <20%) 2021    TTE 3/12/2014:   EF estimated to be less than 20%  Moderate mitral regurgitation.    • Moderate MR 2021   • NICM s/p ICD () 2021   • Tobacco use 2021       Past Surgical History:   Procedure Laterality Date   • CARDIAC CATHETERIZATION N/A 2021    Procedure: Left  Heart Cath;  Surgeon: Mega Ellison MD;  Location:  [facility] INV[facility];  Service: Cardiovascular;  Laterality: N/A;   • CARDIAC DEFIBRILLATOR PLACEMENT         Social History     Socioeconomic History   • Marital status:    Tobacco Use   • Smoking status: Current Every Day Smoker     Packs/day: 1.00     Types: Cigarettes   • Smokeless tobacco: Never Used   Vaping Use   • Vaping Use: Never used   Substance and Sexual Activity   • Alcohol use: Not Currently   • Drug use: Defer   • Sexual activity: Defer       Family History   Problem Relation Age of Onset   • Coronary artery disease Mother    • Deep vein thrombosis Father    • Heart disease Father    • Other Sister         heart issues   • No Known Problems Maternal Grandmother    • No Known Problems Maternal Grandfather    • Stroke Paternal Grandmother    • No Known Problems Paternal Grandfather         ROS:   Constitutional no fever,  no weight loss   Skin no rash, no subcutaneous nodules   Otolaryngeal no difficulty swallowing   Cardiovascular See HPI   Pulmonary no cough, no sputum production   Gastrointestinal no constipation, no diarrhea   Genitourinary no dysuria, no hematuria   Hematologic no easy bruisability, no abnormal bleeding   Musculoskeletal no muscle pain   Neurologic no dizziness, no falls         No Known Allergies      Current Outpatient Medications:   •  amiodarone (PACERONE) 200 MG tablet, Take 1 tablet by mouth twice daily for ten days. Then take 1 tablet by mouth once daily thereafter. (Patient taking differently: 200 mg Daily.), Disp: 110 tablet, Rfl: 3  •  aspirin 81 MG EC tablet, Take 1 tablet by mouth Daily., Disp: 30 tablet, Rfl: 6  •  carvedilol (COREG) 6.25 MG tablet, Take 2 tablets by mouth 2 (Two) Times a Day With Meals., Disp: 60 tablet, Rfl: 6  •  empagliflozin (Jardiance) 10 MG tablet tablet, Take 1 tablet by mouth Daily., Disp: 90 tablet, Rfl: 3  •  vitamin D (ERGOCALCIFEROL) 1.25 MG (38021 UT) capsule  "capsule, Take 1 capsule by mouth 1 (One) Time Per Week. (Patient taking differently: Take 50,000 Units by mouth As Needed.), Disp: 4 capsule, Rfl: 3    Vitals:    10/13/21 0958   BP: 124/88   BP Location: Left arm   Patient Position: Sitting   Pulse: 70   SpO2: 97%   Weight: 82.6 kg (182 lb)   Height: 177.8 cm (70\")     Body mass index is 26.11 kg/m².    PHYSICAL EXAM:    General Appearance:   · well developed  · well nourished  HENT:   · oropharynx moist  · lips not cyanotic  Neck:  · thyroid not enlarged  · supple  Respiratory:  · no respiratory distress  · normal breath sounds  · no rales  Cardiovascular:  · no jugular venous distention  · regular rhythm  · apical impulse normal  · S1 normal, S2 normal  · no S3, no S4   · no murmur  · no rub, no thrill  · carotid pulses normal; no bruit  · pedal pulses normal  · lower extremity edema: none    Gastrointestinal:   · bowel sounds normal  · non-tender  · no hepatomegaly, no splenomegaly  Musculoskeletal:  · no clubbing of fingers.   · normocephalic, head atraumatic  Skin:   · warm, dry  Psychiatric:  · judgement and insight appropriate  · normal mood and affect    RESULTS:     ECG 12 Lead    Date/Time: 10/13/2021 10:34 AM  Performed by: Jorge Butler MD  Authorized by: Jorge Butler MD   Comparison: compared with previous ECG from 8/16/2021  Similar to previous ECG  Rhythm: sinus rhythm  Rate: normal  QRS axis: normal    Clinical impression: non-specific ECG  Comments:  ms.  Septal infarct noted, unchanged from previous.              Results for orders placed during the hospital encounter of 07/08/21    Adult Transthoracic Echo Complete W/ Cont if Necessary Per Protocol    Interpretation Summary  · The left ventricular cavity is moderately dilated.  · Estimated left ventricular EF = 20% Left ventricular systolic function is severely decreased.  · Estimated right ventricular systolic pressure from tricuspid regurgitation is mildly elevated (35-45 mmHg). " Calculated right ventricular systolic pressure from tricuspid regurgitation is 40 mmHg.  · The right ventricular cavity is dilated.  · The right atrial cavity is mildly dilated.  · The findings are consistent with dilated cardiomyopathy.  · Left atrial volume is mildly increased.        Labs:  Lab Results   Component Value Date    CHOL 181 07/09/2021    TRIG 124 07/09/2021    HDL 32 (L) 07/09/2021     (H) 07/09/2021    AST 21 08/16/2021    ALT 24 08/16/2021     Lab Results   Component Value Date    HGBA1C 5.5 07/14/2021     No components found for: CREATINININE  eGFR Non  Am   Date Value Ref Range Status   08/16/2021 90 >59 mL/min/1.73 Final   07/14/2021 77 >59 mL/min/1.73 Final     eGFR Non  Amer   Date Value Ref Range Status   07/08/2021 93 >60 mL/min/1.73 Final   04/16/2021 76 >60 mL/min/1.73 Final       Most recent PCP note, imaging tests, and labs reviewed.    ASSESSMENT:  Problem List Items Addressed This Visit        Cardiac and Vasculature    CAD with  of LAD (Chronic)    Overview     8/6/21 Stress: large-sized infarct in distal anterior, apex and distal inferior walls,  No significant ischemia noted, LVEF 23%  7/8/21 LHC: %  collaterals from right system, LCX luminal irregularities RCA luminal irregularities with collaterals to LAD  LV: LVEF severe mid and distal anterior wall apical distal inferior wall dyskinesis with a large scar ejection fraction approximately 20%               HFrEF (EF <20%) (Chronic)    Overview     7/8/21 Echo: EF less than 20%, moderate MR    TTE 3/12/2014:    · EF estimated to be less than 20%   · Moderate mitral regurgitation.           Moderate MR (Chronic)    Ventricular tachycardia (HCC)    Overview     BSC ICD         Hyperlipidemia LDL goal <70 - Primary    Overview     Patient reports he has discontinued atorvastatin for now.  He is advised to continue a low-fat, low-cholesterol diet.         Essential hypertension    Overview     120/80 in  the office today.  Continue medications as directed.  Stop smoking.               PLAN:    1.  Acute on chronic systolic heart failure, EF less than 20%:  Ischemic etiology with  of the LAD noted in 2014  Continue carvedilol at current dose  Continue Jardiance 10 mg daily  Patient had hyperkalemia, at 5.9 on 8/16/2021, cannot start Entresto or spironolactone for that reason.  Lasix was given but he could not tolerate due to nightmares and syncope.    Refer to EP has appointment in September, for consideration of BiV ICD, left bundle morphology, QRS greater than  120 ms    Lexiscan nuclear stress test large anterior infarct with no ischemia 8/2021.    2.  Recurrent ventricular tachycardia and ventricular fibrillation:  11 episodes of VT terminated with ATP over the past 3 weeks, 1 successful defibrillation for ventricular fibrillation.  EP consultation 11/8/2021 with Dr. Shah    Continue amiodarone 200 mg daily  EKG reviewed, stable.    3.  Presents of single-chamber [employer] ICD:  Refer to EP for consideration of upgrade to BiV  Last interrogation reviewed and normal.    4.  CAD:   of the LAD, cath films reviewed from 7/8/2021 by Dr. Ellison   noted with faint right to left collaterals,  was noted in 2014  Continue aspirin  Start atorvastatin 40 mg daily  Lexiscan nuclear stress test no significant ischemia noted     5.  Hyperkalemia:  Refer to Dr. Harsha Torres of nephrology      Return to clinic in 4  Months, or sooner as needed.    Thank you for the opportunity to share in the care of your patient; please do not hesitate to call me with any questions.     Jorge Butler MD, [facility]  Office: [phone] [address] [place] [address]    10/13/21

## 2021-10-13 ENCOUNTER — OFFICE VISIT (OUTPATIENT)
Dept: CARDIOLOGY | Facility: CLINIC | Age: 46
End: 2021-10-13

## 2021-10-13 VITALS
SYSTOLIC BLOOD PRESSURE: 124 MMHG | HEIGHT: 70 IN | HEART RATE: 70 BPM | OXYGEN SATURATION: 97 % | WEIGHT: 182 LBS | DIASTOLIC BLOOD PRESSURE: 88 MMHG | BODY MASS INDEX: 26.05 KG/M2

## 2021-10-13 DIAGNOSIS — I47.20 VENTRICULAR TACHYCARDIA (HCC): ICD-10-CM

## 2021-10-13 DIAGNOSIS — I34.0 MODERATE MITRAL REGURGITATION: Chronic | ICD-10-CM

## 2021-10-13 DIAGNOSIS — E78.5 HYPERLIPIDEMIA LDL GOAL <70: Primary | ICD-10-CM

## 2021-10-13 DIAGNOSIS — I25.118 CORONARY ARTERY DISEASE OF NATIVE ARTERY OF NATIVE HEART WITH STABLE ANGINA PECTORIS (HCC): Chronic | ICD-10-CM

## 2021-10-13 DIAGNOSIS — I50.20 HFREF (HEART FAILURE WITH REDUCED EJECTION FRACTION) (HCC): Chronic | ICD-10-CM

## 2021-10-13 DIAGNOSIS — I10 ESSENTIAL HYPERTENSION: ICD-10-CM

## 2021-10-13 PROCEDURE — 93000 ELECTROCARDIOGRAM COMPLETE: CPT | Performed by: INTERNAL MEDICINE

## 2021-10-13 PROCEDURE — 99214 OFFICE O/P EST MOD 30 MIN: CPT | Performed by: INTERNAL MEDICINE

## 2021-10-13 RX ORDER — ATORVASTATIN CALCIUM 40 MG/1
40 TABLET, FILM COATED ORAL NIGHTLY
Qty: 90 TABLET | Refills: 3 | Status: SHIPPED | OUTPATIENT
Start: 2021-10-13 | End: 2021-11-19

## 2021-11-08 ENCOUNTER — OFFICE VISIT (OUTPATIENT)
Dept: CARDIOLOGY | Facility: CLINIC | Age: 46
End: 2021-11-08

## 2021-11-08 VITALS
OXYGEN SATURATION: 99 % | HEART RATE: 60 BPM | HEIGHT: 70 IN | DIASTOLIC BLOOD PRESSURE: 72 MMHG | SYSTOLIC BLOOD PRESSURE: 122 MMHG | WEIGHT: 189 LBS | BODY MASS INDEX: 27.06 KG/M2

## 2021-11-08 DIAGNOSIS — I47.20 VENTRICULAR TACHYCARDIA (HCC): Primary | ICD-10-CM

## 2021-11-08 DIAGNOSIS — Z72.0 TOBACCO USE: Chronic | ICD-10-CM

## 2021-11-08 DIAGNOSIS — I50.20 HFREF (HEART FAILURE WITH REDUCED EJECTION FRACTION) (HCC): Chronic | ICD-10-CM

## 2021-11-08 DIAGNOSIS — I10 ESSENTIAL HYPERTENSION: ICD-10-CM

## 2021-11-08 DIAGNOSIS — I25.118 CORONARY ARTERY DISEASE OF NATIVE ARTERY OF NATIVE HEART WITH STABLE ANGINA PECTORIS (HCC): Chronic | ICD-10-CM

## 2021-11-08 PROBLEM — F51.04 CHRONIC INSOMNIA: Status: ACTIVE | Noted: 2021-11-08

## 2021-11-08 PROCEDURE — 99214 OFFICE O/P EST MOD 30 MIN: CPT | Performed by: INTERNAL MEDICINE

## 2021-11-08 PROCEDURE — 93282 PRGRMG EVAL IMPLANTABLE DFB: CPT | Performed by: INTERNAL MEDICINE

## 2021-11-08 NOTE — PROGRESS NOTES
Cardiac Electrophysiology Outpatient Consult Note            [facility]    Consult Note     Sonido Newsome  [id number]  11/08/2021  [phone]     Primary Care Physician: Abi Granados PA-C    Referred By: Jorge Butler MD    Subjective     Chief Complaint:   Diagnoses and all orders for this visit:    1. Ventricular tachycardia (HCC) (Primary)    2. HFrEF (EF <20%)    3. Coronary artery disease of native artery of native heart with stable angina pectoris (HCC)    4. Essential hypertension    5. Tobacco use    Other orders  -     ECG 12 Lead      Chief Complaint   Patient presents with   • Coronary artery disease of native artery of native heart wit       History of Present Illness:   Sonido Newsome is a 45 y.o. male who presents to my electrophysiology clinic for evaluation of VT, consider upgrade to BiV ICD.  This is a 45-year old hypertensive dyslipidemic male smoker with ischemic cardiomyopathy.  He has had an ICD since March 2014.  Earlier this year he had ICD shocks and underwent cardiac catheterization which showed no changes from 2014.  He was prescribed metoprolol which she did not tolerate.  He has had 3 episodes of VT prior to starting carvedilol and amiodarone.  Since starting this regimen he has had no recurrence of VT.  He is not a candidate for Entresto or Aldactone due to hyperkalemia.  He has also been intolerant to Lasix.  At present he has no current complaint of exertional chest pain or dyspnea, denies orthopnea, PND, claudication, lower extremity edema.  He has had no awareness of tachyarrhythmias, he denies dizziness or syncope.  His blood pressure at home is well managed on his current medical regimen.  He states compliance with current medical regimen reports no significant adverse side effects.        General cardiology note dated 10/13/2021 was reviewed.  He presented for follow-up after recent MI and cardiac  catheterization.  His previous ICD secondary to EF of 20%.  He had ICD shocks in April.  He returned in July with recurrent ICD shocks.  He underwent emergency cardiac catheterization showing no significant changes from 2014.  He was started on carvedilol and aspirin.  He returned 2 days later with another ICD shock secondary to VF.  He had a myocardial perfusion study which showed a large anterior and distal inferior infarct with EF of 23%.  He is not a candidate for Entresto or spironolactone due to hyperkalemia.  He is intolerant to Lasix and metoprolol.  He was referred to our service to consider upgrade to BiV ICD.    Past Medical History:   Patient Active Problem List    Diagnosis Date Noted   • HFrEF (EF <20%) 07/08/2021     Priority: High     Note Last Updated: 11/8/2021     TTE 3/12/2014:  EF estimated to be less than 20%. Moderate mitral regurgitation.  7/8/21 Echo: EF less than 20%, moderate MR     • Ventricular tachycardia (HCC) 07/08/2021     Priority: High     Note Last Updated: 11/8/2021     BSC ICD     • CAD with  of LAD 07/08/2021     Priority: Medium     Note Last Updated: 11/8/2021 8/6/21 Stress: large-sized infarct in distal anterior, apex and distal inferior walls,  No significant ischemia noted, LVEF 23%  7/8/21 LHC: %  collaterals from right system, LCX luminal irregularities RCA luminal irregularities with collaterals to LAD  LV: LVEF severe mid and distal anterior wall apical distal inferior wall dyskinesis with a large scar ejection fraction approximately 20%        • Hyperlipidemia LDL goal <70 07/29/2021     Priority: Low   • Essential hypertension 07/29/2021     Priority: Low   • Tobacco use 07/08/2021     Priority: Low   • Moderate MR 07/08/2021     Priority: Low   • Chronic insomnia 11/08/2021       Past Surgical History:   Past Surgical History:   Procedure Laterality Date   • CARDIAC CATHETERIZATION N/A 7/8/2021    Procedure: Left Heart Cath;  Surgeon: Scot  "Mega Gaxiola MD;  Location: [facility] INVASIVE LOCATION;  Service: Cardiovascular;  Laterality: N/A;   • CARDIAC DEFIBRILLATOR PLACEMENT         Social History:   Social History     Socioeconomic History   • Marital status:    Tobacco Use   • Smoking status: Current Every Day Smoker     Packs/day: 1.00     Types: Cigarettes   • Smokeless tobacco: Never Used   Vaping Use   • Vaping Use: Never used   Substance and Sexual Activity   • Alcohol use: Not Currently   • Drug use: Defer   • Sexual activity: Defer       Medications:     Current Outpatient Medications:   •  amiodarone (PACERONE) 200 MG tablet, Take 1 tablet by mouth twice daily for ten days. Then take 1 tablet by mouth once daily thereafter. (Patient taking differently: 200 mg Daily.), Disp: 110 tablet, Rfl: 3  •  aspirin 81 MG EC tablet, Take 1 tablet by mouth Daily., Disp: 30 tablet, Rfl: 6  •  atorvastatin (LIPITOR) 40 MG tablet, Take 1 tablet by mouth Every Night., Disp: 90 tablet, Rfl: 3  •  carvedilol (COREG) 6.25 MG tablet, Take 2 tablets by mouth 2 (Two) Times a Day With Meals., Disp: 60 tablet, Rfl: 6  •  empagliflozin (Jardiance) 10 MG tablet tablet, Take 1 tablet by mouth Daily., Disp: 90 tablet, Rfl: 3    Allergies:   No Known Allergies    Objective   Vital Signs:   Vitals:    11/08/21 1000   BP: 122/72   BP Location: Right arm   Patient Position: Sitting   Pulse: 60   SpO2: 99%   Weight: 85.7 kg (189 lb)   Height: 177.8 cm (70\")       PHYSICAL EXAM  General appearance: Awake, alert, cooperative  Head: Normocephalic, without obvious abnormality, atraumatic  Eyes: Conjunctivae/corneas clear, EOMs intact  Neck: no adenopathy, no carotid bruit, no JVD and thyroid: not enlarged  Lungs: clear to auscultation bilaterally and no rhonchi or crackles\", ' symmetric  Heart: regular rate and rhythm, S1, S2 normal, no murmur, click, rub or gallop  Abdomen: Soft, non-tender, bowel sounds normal,  no organomegaly  Extremities: extremities normal, " atraumatic, no cyanosis or edema  Skin: Skin color, turgor normal, no rashes or lesions  Neurologic: Grossly normal     Lab Results   Component Value Date    GLUCOSE 62 (L) 08/16/2021    CALCIUM 9.7 08/16/2021     08/16/2021    K 5.9 (H) 08/16/2021    CO2 25 08/16/2021     08/16/2021    BUN 13 08/16/2021    CREATININE 1.02 08/16/2021    EGFRIFAFRI 104 08/16/2021    EGFRIFNONA 90 08/16/2021    BCR 13 08/16/2021    ANIONGAP 13.0 07/08/2021     Lab Results   Component Value Date    WBC 12.2 (H) 07/14/2021    HGB 16.6 07/14/2021    HCT 49.8 07/14/2021    MCV 85 07/14/2021     (H) 07/14/2021     No results found for: INR, PROTIME  Lab Results   Component Value Date    TSH 2.870 07/14/2021       Cardiac Testing:      I personally viewed and interpreted the patient's EKG/Telemetry/lab data      ECG 12 Lead    Date/Time: 11/8/2021 10:24 AM  Performed by: Jn Shah DO  Authorized by: Jn Shah DO   Previous ECG: no previous ECG available  Rhythm: sinus bradycardia  Rate: bradycardic  BPM: 56  Conduction: conduction normal  Q waves: V2, V3 and V4    T inversion: aVL and V4  QRS axis: normal    Clinical impression: abnormal EKG  Comments:  ms            Tobacco Cessation: N/A  Obstructive Sleep Apnea Screening: Completed    Assessment & Plan    Diagnoses and all orders for this visit:    1. Ventricular tachycardia (HCC) (Primary)    2. HFrEF (EF <20%)    3. Coronary artery disease of native artery of native heart with stable angina pectoris (HCC)    4. Essential hypertension    5. Tobacco use    Other orders  -     ECG 12 Lead         Diagnosis Plan   1. Ventricular tachycardia (HCC)   suppressed at this point with amiodarone.  No further ICD shocks since initiating amiodarone.  VT and VF episodes likely scar mediated.    He and I discussed the option of continued antiarrhythmic drug therapy for the next several months then followed by further discussion about catheter ablation.  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  This is the first time in some months that he has been free of repeated shocks.  For this he is grateful.  We will keep an eye on things closely.    We will check amiodarone surveillance laboratory studies when we see him next in February.   2. HFrEF (EF <20%)   ischemic cardiomyopathy.  QRS duration less than 120 ms.  Incomplete left bundle branch block.  Single-chamber ICD system.  No indication for upgrade to a CRT system at this point.    Euvolemic.    No need for diuresis.    Continue carvedilol.  Has been profoundly intolerant of ACE inhibitor's angiotensin receptor blockers and Entresto.  This is the domain of the patient's cardiologist.   3. Coronary artery disease of native artery of native heart with stable angina pectoris (HCC)   stable.  Aspirin.  Statin.   4. Essential hypertension   blood pressure well controlled today.  Continue carvedilol.   5. Tobacco use  Ongoing Tobacco Abuse Counseling:    This patient currently uses tobacco products.  We discussed that tobacco abuse is an addiction and is strongly linked with poor health outcomes such as heart disease, stroke, vascular disease, cancer and premature death.  I strongly advised the patient that immediate cessation of tobacco is critical to preserving, maintaining and improving their health.  We discussed various methods of tobacco abuse cessation, including counseling and various pharmacologic and non-pharmacologic therapies. We spent over 10 minutes discussing all options pertinent to tobacco cessation.  The patient voiced a clear understanding of these risks and these medical facts regarding tobacco abuse.       Body mass index is 27.12 kg/m².    I spent 48 minutes in consultation with this patient which included more than 65% of this time in direct face-to-face counseling, physical examination and discussion  of my assessment and findings and shared decision making with the patient.  The remainder of the time not spent face to face was performing one, some or all of the following actions:  preparing to see this patient ( eg. Review of tests),  ordering medications, tests or procedures ), care coordination, discussion of the plan with other healthcare providers, documenting clinical information in Epic well as independently interpreting results and communicating results to patient, family and or caregiver.  All time noted occurred on the date of service.    Follow Up:       Thank you for allowing me to participate in the care of your patient. Please to not hesitate to contact me with additional questions or concerns.      Jn Shah DO, FACC, RS  Cardiac Electrophysiologist  [facility]    Scribed for Jn Shah DO

## 2021-11-19 ENCOUNTER — OFFICE VISIT (OUTPATIENT)
Dept: INTERNAL MEDICINE | Facility: CLINIC | Age: 46
End: 2021-11-19

## 2021-11-19 ENCOUNTER — LAB (OUTPATIENT)
Dept: LAB | Facility: HOSPITAL | Age: 46
End: 2021-11-19

## 2021-11-19 VITALS
SYSTOLIC BLOOD PRESSURE: 121 MMHG | DIASTOLIC BLOOD PRESSURE: 75 MMHG | HEART RATE: 71 BPM | TEMPERATURE: 98.2 F | HEIGHT: 70 IN | BODY MASS INDEX: 27.23 KG/M2 | WEIGHT: 190.2 LBS

## 2021-11-19 DIAGNOSIS — E55.9 VITAMIN D DEFICIENCY: ICD-10-CM

## 2021-11-19 DIAGNOSIS — E87.5 HYPERKALEMIA: Primary | ICD-10-CM

## 2021-11-19 DIAGNOSIS — Z72.0 TOBACCO USE: Chronic | ICD-10-CM

## 2021-11-19 DIAGNOSIS — I10 ESSENTIAL HYPERTENSION: ICD-10-CM

## 2021-11-19 DIAGNOSIS — E78.5 HYPERLIPIDEMIA LDL GOAL <70: ICD-10-CM

## 2021-11-19 DIAGNOSIS — F51.04 CHRONIC INSOMNIA: ICD-10-CM

## 2021-11-19 DIAGNOSIS — I47.20 VENTRICULAR TACHYCARDIA (HCC): ICD-10-CM

## 2021-11-19 DIAGNOSIS — I25.118 CORONARY ARTERY DISEASE OF NATIVE ARTERY OF NATIVE HEART WITH STABLE ANGINA PECTORIS (HCC): ICD-10-CM

## 2021-11-19 DIAGNOSIS — I50.20 HFREF (HEART FAILURE WITH REDUCED EJECTION FRACTION) (HCC): Chronic | ICD-10-CM

## 2021-11-19 PROCEDURE — 99214 OFFICE O/P EST MOD 30 MIN: CPT | Performed by: PHYSICIAN ASSISTANT

## 2021-11-19 NOTE — PROGRESS NOTES
Patient Care Team:  Abi Granados PA-C as PCP - General (Physician Assistant)    Chief Complaint::   Chief Complaint   Patient presents with   • Coronary Artery Disease     follow up         Subjective     HPI  Iam is a 45-year-old male with history of ventricular tachycardia, CAD with  of LAD, ejection fraction less than 20%, tobacco abuse, who presents for follow-up.  He has since had consultations with Dr. Butler and Dr. Shah.  He is currently taking amiodarone 200 mg tablets daily.  He also continues to take carvedilol 6.25 mg tablets daily.  Jardiance 10 mg tablets have been added renal protection.  Since starting this regimen, he has had no recurrence of ventricular tachycardia.  Felt not to be a candidate for Entresto or Aldactone due to hyperkalemia.  He is also intolerant to Lasix.  He denies chest pain, orthopnea, claudication, or edema.  He has no awareness of tachyarrhythmias, he denies dizziness or syncope.  Wife has stopped smoking.  He is wanting to stop smoking.  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  History of chronic insomnia, reports going to bed approximately midnight waking up at 5 AM.  Light sleeper, frequently wakes up throughout the night.  He declines sleep testing or sleep aids.        The following portions of the patient's history were reviewed and updated as appropriate: active problem list, medication list, allergies, family history, social history    Review of Systems:   Review of Systems   Constitutional: Negative for activity change, appetite change, diaphoresis, fatigue, unexpected weight gain and unexpected weight loss.   HENT: Negative for hearing loss.    Eyes: Negative for blurred vision, double vision and visual disturbance.   Respiratory: Positive for shortness of breath. Negative for chest tightness.    Cardiovascular: Negative for chest pain, palpitations and leg swelling.   Gastrointestinal: Negative for abdominal  "pain, blood in stool, GERD and indigestion.   Endocrine: Negative for cold intolerance and heat intolerance.   Genitourinary: Negative for dysuria and hematuria.   Musculoskeletal: Negative for arthralgias and myalgias.   Skin: Negative for skin lesions.   Neurological: Negative for tremors, seizures, syncope, speech difficulty, weakness, headache, memory problem and confusion.   Hematological: Does not bruise/bleed easily.   Psychiatric/Behavioral: Positive for sleep disturbance. Negative for depressed mood. The patient is not nervous/anxious.        Vital Signs  Vitals:    11/19/21 1033   BP: 121/75   BP Location: Left arm   Patient Position: Sitting   Cuff Size: Adult   Pulse: 71   Temp: 98.2 °F (36.8 °C)   TempSrc: Temporal   Weight: 86.3 kg (190 lb 3.2 oz)   Height: 177.8 cm (70\")   PainSc: 0-No pain     Body mass index is 27.29 kg/m².    Labs  No visits with results within 3 Month(s) from this visit.   Latest known visit with results is:   Orders Only on 08/16/2021   Component Date Value Ref Range Status   • Glucose 08/16/2021 62* 65 - 99 mg/dL Final   • BUN 08/16/2021 13  6 - 24 mg/dL Final   • Creatinine 08/16/2021 1.02  0.76 - 1.27 mg/dL Final   • eGFR Non  Am 08/16/2021 90  >59 mL/min/1.73 Final   • eGFR African Am 08/16/2021 104  >59 mL/min/1.73 Final    Comment: **[facility] currently reports eGFR in compliance with the current**    recommendations of the National Kidney Foundation. [facility] will    update reporting as new guidelines are published from the NKF-ASN    Task force.     • BUN/Creatinine Ratio 08/16/2021 13  9 - 20 Final   • Sodium 08/16/2021 142  134 - 144 mmol/L Final   • Potassium 08/16/2021 5.9* 3.5 - 5.2 mmol/L Final   • Chloride 08/16/2021 101  96 - 106 mmol/L Final   • Total CO2 08/16/2021 25  20 - 29 mmol/L Final   • Calcium 08/16/2021 9.7  8.7 - 10.2 mg/dL Final   • Total Protein 08/16/2021 7.1  6.0 - 8.5 g/dL Final   • Albumin 08/16/2021 4.8  4.0 - 5.0 g/dL Final   • Globulin " 08/16/2021 2.3  1.5 - 4.5 g/dL Final   • A/G Ratio 08/16/2021 2.1  1.2 - 2.2 Final   • Total Bilirubin 08/16/2021 0.4  0.0 - 1.2 mg/dL Final   • Alkaline Phosphatase 08/16/2021 91  48 - 121 IU/L Final   • AST (SGOT) 08/16/2021 21  0 - 40 IU/L Final   • ALT (SGPT) 08/16/2021 24  0 - 44 IU/L Final   • Magnesium 08/16/2021 2.5* 1.6 - 2.3 mg/dL Final       Imaging  No radiology results for the last 30 days.      Current Outpatient Medications:   •  amiodarone (PACERONE) 200 MG tablet, Take 1 tablet by mouth twice daily for ten days. Then take 1 tablet by mouth once daily thereafter. (Patient taking differently: 200 mg Daily.), Disp: 110 tablet, Rfl: 3  •  aspirin 81 MG EC tablet, Take 1 tablet by mouth Daily., Disp: 30 tablet, Rfl: 6  •  carvedilol (COREG) 6.25 MG tablet, Take 2 tablets by mouth 2 (Two) Times a Day With Meals., Disp: 60 tablet, Rfl: 6  •  empagliflozin (Jardiance) 10 MG tablet tablet, Take 1 tablet by mouth Daily., Disp: 90 tablet, Rfl: 3    Physical Exam:    Physical Exam  Vitals reviewed.   Constitutional:       Appearance: Normal appearance. He is well-developed and normal weight.   HENT:      Head: Normocephalic and atraumatic.      Right Ear: Hearing, tympanic membrane, ear canal and external ear normal.      Left Ear: Hearing, tympanic membrane, ear canal and external ear normal.      Nose: Nose normal.      Mouth/Throat:      Mouth: Mucous membranes are moist.      Pharynx: Oropharynx is clear. Uvula midline.   Eyes:      General: Lids are normal.      Conjunctiva/sclera: Conjunctivae normal.      Pupils: Pupils are equal, round, and reactive to light.   Cardiovascular:      Rate and Rhythm: Normal rate and regular rhythm.      Heart sounds: Normal heart sounds.   Pulmonary:      Effort: Pulmonary effort is normal.      Breath sounds: Normal breath sounds.   Abdominal:      General: Abdomen is flat. Bowel sounds are normal.      Palpations: Abdomen is soft.      Tenderness: There is no right CVA  tenderness or left CVA tenderness.   Musculoskeletal:         General: Normal range of motion.      Cervical back: Full passive range of motion without pain, normal range of motion and neck supple.      Right lower leg: No edema.      Left lower leg: No edema.   Skin:     General: Skin is warm and dry.   Neurological:      General: No focal deficit present.      Mental Status: He is alert and oriented to person, place, and time. Mental status is at baseline.      Deep Tendon Reflexes: Reflexes are normal and symmetric.   Psychiatric:         Mood and Affect: Mood normal.         Speech: Speech normal.         Behavior: Behavior normal.         Thought Content: Thought content normal.         Judgment: Judgment normal.         Procedures        Assessment/Plan   Problem List Items Addressed This Visit        Cardiac and Vasculature    CAD with  of LAD (Chronic)    Overview     · 8/6/21 Stress: large-sized infarct in distal anterior, apex and distal inferior walls,  No significant ischemia noted, LVEF 23%  · 7/8/21 LHC: %  collaterals from right system, LCX luminal irregularities RCA luminal irregularities with collaterals to LAD  · LV: LVEF severe mid and distal anterior wall apical distal inferior wall dyskinesis with a large scar ejection fraction approximately 20%            Relevant Medications    carvedilol (COREG) 6.25 MG tablet    HFrEF (EF <20%) (Chronic)    Overview     · TTE 3/12/2014:  EF estimated to be less than 20%. Moderate mitral regurgitation.  · 7/8/21 Echo: EF less than 20%, moderate MR         Relevant Medications    carvedilol (COREG) 6.25 MG tablet    Ventricular tachycardia (HCC)    Overview     · BSC ICD         Relevant Medications    carvedilol (COREG) 6.25 MG tablet    Hyperlipidemia LDL goal <70    Overview     Discontinued atorvastatin 40 mg nightly due to side effects.         Essential hypertension    Overview     Blood pressure 121/75.  Well controlled.         Relevant  Medications    carvedilol (COREG) 6.25 MG tablet    Other Relevant Orders    CBC & Differential    Comprehensive Metabolic Panel       Sleep    Chronic insomnia    Overview     Light sleeper.  Goes to bed at midnight wakes up between 5 and 6 AM.  Declines sleep aid or referral to sleep medicine.            Tobacco    Tobacco use (Chronic)    Overview     Reports spouse has quit smoking.  He is also trying to stop.  Declines NicoDerm patches for now.           Other Visit Diagnoses     Hyperkalemia    -  Primary    Relevant Orders    Ambulatory Referral to Nephrology    MicroAlbumin, Urine, Random - Urine, Clean Catch    Vitamin D deficiency        Relevant Orders    Vitamin D 25 Hydroxy          Return in about 4 months (around 3/19/2022) for RECHECK 30MIN.    Plan of care reviewed with patient at the conclusion of today's visit. Education was provided regarding diagnosis, management, and any prescribed or recommended OTC medications.Patient verbalizes understanding of and agreement with management plan.     I spent 35 minutes caring for Sonido on this date of service. This time includes time spent by me in the following activities:preparing for the visit, reviewing tests, obtaining and/or reviewing a separately obtained history, performing a medically appropriate examination and/or evaluation , counseling and educating the patient/family/caregiver, ordering medications, tests, or procedures, referring and communicating with other health care professionals , documenting information in the medical record and independently interpreting results and communicating that information with the patient/family/caregiver    Abi Granados PA-C    Please note that portions of this note were completed with a voice recognition program.

## 2021-11-20 LAB
25(OH)D3+25(OH)D2 SERPL-MCNC: 25.7 NG/ML (ref 30–100)
ALBUMIN SERPL-MCNC: 4.6 G/DL (ref 3.5–5.2)
ALBUMIN/GLOB SERPL: 2.1 G/DL
ALP SERPL-CCNC: 106 U/L (ref 39–117)
ALT SERPL-CCNC: 20 U/L (ref 1–41)
AST SERPL-CCNC: 15 U/L (ref 1–40)
BASOPHILS # BLD AUTO: 0.31 10*3/MM3 (ref 0–0.2)
BASOPHILS NFR BLD AUTO: 2 % (ref 0–1.5)
BILIRUB SERPL-MCNC: 0.4 MG/DL (ref 0–1.2)
BUN SERPL-MCNC: 11 MG/DL (ref 6–20)
BUN/CREAT SERPL: 9.6 (ref 7–25)
CALCIUM SERPL-MCNC: 9.4 MG/DL (ref 8.6–10.5)
CHLORIDE SERPL-SCNC: 105 MMOL/L (ref 98–107)
CO2 SERPL-SCNC: 27.6 MMOL/L (ref 22–29)
CREAT SERPL-MCNC: 1.14 MG/DL (ref 0.76–1.27)
EOSINOPHIL # BLD AUTO: 0.47 10*3/MM3 (ref 0–0.4)
EOSINOPHIL NFR BLD AUTO: 3.1 % (ref 0.3–6.2)
ERYTHROCYTE [DISTWIDTH] IN BLOOD BY AUTOMATED COUNT: 17.2 % (ref 12.3–15.4)
GLOBULIN SER CALC-MCNC: 2.2 GM/DL
GLUCOSE SERPL-MCNC: 77 MG/DL (ref 65–99)
HCT VFR BLD AUTO: 47.9 % (ref 37.5–51)
HGB BLD-MCNC: 16.5 G/DL (ref 13–17.7)
IMM GRANULOCYTES # BLD AUTO: 0.1 10*3/MM3 (ref 0–0.05)
IMM GRANULOCYTES NFR BLD AUTO: 0.7 % (ref 0–0.5)
LYMPHOCYTES # BLD AUTO: 1.99 10*3/MM3 (ref 0.7–3.1)
LYMPHOCYTES NFR BLD AUTO: 12.9 % (ref 19.6–45.3)
MCH RBC QN AUTO: 30.2 PG (ref 26.6–33)
MCHC RBC AUTO-ENTMCNC: 34.4 G/DL (ref 31.5–35.7)
MCV RBC AUTO: 87.7 FL (ref 79–97)
MONOCYTES # BLD AUTO: 0.86 10*3/MM3 (ref 0.1–0.9)
MONOCYTES NFR BLD AUTO: 5.6 % (ref 5–12)
NEUTROPHILS # BLD AUTO: 11.64 10*3/MM3 (ref 1.7–7)
NEUTROPHILS NFR BLD AUTO: 75.7 % (ref 42.7–76)
NRBC BLD AUTO-RTO: 0.2 /100 WBC (ref 0–0.2)
PLATELET # BLD AUTO: 567 10*3/MM3 (ref 140–450)
POTASSIUM SERPL-SCNC: 5.2 MMOL/L (ref 3.5–5.2)
PROT SERPL-MCNC: 6.8 G/DL (ref 6–8.5)
RBC # BLD AUTO: 5.46 10*6/MM3 (ref 4.14–5.8)
SODIUM SERPL-SCNC: 144 MMOL/L (ref 136–145)
WBC # BLD AUTO: 15.37 10*3/MM3 (ref 3.4–10.8)

## 2022-01-12 ENCOUNTER — TELEPHONE (OUTPATIENT)
Dept: CARDIOLOGY | Facility: CLINIC | Age: 47
End: 2022-01-12

## 2022-02-23 ENCOUNTER — TELEPHONE (OUTPATIENT)
Dept: INTERNAL MEDICINE | Facility: CLINIC | Age: 47
End: 2022-02-23

## 2022-03-04 NOTE — PROGRESS NOTES
OFFICE VISIT  NOTE  [facility] CARDIOLOGY      Name: Sonido Newsmoe    Date: 3/7/2022  MRN:  [id number]  :  1975      REFERRING/PRIMARY PROVIDER:  Abi Granados PA-C    Chief Complaint   Patient presents with   • Hyperlipidemia LDL goal <70   • Coronary artery disease of native artery of native heart wi       HPI: Sonido Newsome is a 46 y.o. male who presents today for follow-up for CAD and systolic heart failure.  Myocardial infarction in  with  of the LAD noted by Dr. Ellison, EF less than 20%, single-chamber [employer] ICD placed at that time.  ICD defibrillation 2021, beta-blocker added he cannot tolerate metoprolol for nonspecific reasons.  Return to the hospital on 2021 with ventricular tachycardia, too slow for ICD shock, therefore EMS defibrillated the patient, he underwent emergent cath by Dr. Ellison which showed no changes from  with persistent  of the LAD.  He was started on carvedilol and aspirin.  2 days after returning home he had another defibrillation for ventricular fibrillation, he stated he had not taken his beta-blocker yet that day.     Stress MPI 2021 showed large anterior and distal inferior infarct, no ischemia, EF 23%.  Cannot tolerate many drugs, had hyperkalemia therefore preventing Entresto or spironolactone addition.  Stopped Jardiance recently due to dizziness.  Cannot tolerate atorvastatin due to myalgia, has questions regarding amiodarone and long-term side effects.  Denies chest pain or shortness of breath.    Past Medical History:   Diagnosis Date   • CAD 2021   • HFrEF (EF <20%) 2021    TTE 3/12/2014:   EF estimated to be less than 20%  Moderate mitral regurgitation.    • Moderate MR 2021   • Myocardial infarction (HCC)    • Tobacco use 2021       Past Surgical History:   Procedure Laterality Date   • CARDIAC CATHETERIZATION N/A 2021    Procedure: Left Heart Cath;  Surgeon:  Mega Ellison MD;  [facility];  Service: Cardiovascular;  Laterality: N/A;   • CARDIAC DEFIBRILLATOR PLACEMENT         Social History     Socioeconomic History   • Marital status:    Tobacco Use   • Smoking status: Current Every Day Smoker     Packs/day: 1.00     Types: Cigarettes   • Smokeless tobacco: Never Used   Vaping Use   • Vaping Use: Never used   Substance and Sexual Activity   • Alcohol use: Not Currently   • Drug use: Defer   • Sexual activity: Defer       Family History   Problem Relation Age of Onset   • Coronary artery disease Mother    • Deep vein thrombosis Father    • Heart disease Father    • Other Sister         heart issues   • No Known Problems Maternal Grandmother    • No Known Problems Maternal Grandfather    • Stroke Paternal Grandmother    • No Known Problems Paternal Grandfather         ROS:   Constitutional no fever,  no weight loss   Skin no rash, no subcutaneous nodules   Otolaryngeal no difficulty swallowing   Cardiovascular See HPI   Pulmonary no cough, no sputum production   Gastrointestinal no constipation, no diarrhea   Genitourinary no dysuria, no hematuria   Hematologic no easy bruisability, no abnormal bleeding   Musculoskeletal no muscle pain   Neurologic no dizziness, no falls         Allergies   Allergen Reactions   • Atorvastatin Myalgia   • Jardiance [Empagliflozin] Dizziness         Current Outpatient Medications:   •  amiodarone (PACERONE) 200 MG tablet, Take 1 tablet by mouth twice daily for ten days. Then take 1 tablet by mouth once daily thereafter. (Patient taking differently: 200 mg Daily.), Disp: 110 tablet, Rfl: 3  •  aspirin 81 MG EC tablet, Take 1 tablet by mouth Daily., Disp: 30 tablet, Rfl: 6  •  carvedilol (COREG) 6.25 MG tablet, Take 2 tablets by mouth 2 (Two) Times a Day With Meals., Disp: 60 tablet, Rfl: 6    Vitals:    03/07/22 1014   BP: 130/82   BP Location: Right arm   Patient Position: Sitting   Pulse: 71  "  SpO2: 98%   Weight: 89 kg (196 lb 4.8 oz)   Height: 177.8 cm (70\")     Body mass index is 28.17 kg/m².    PHYSICAL EXAM:    General Appearance:   · well developed  · well nourished  HENT:   · oropharynx moist  · lips not cyanotic  Neck:  · thyroid not enlarged  · supple  Respiratory:  · no respiratory distress  · normal breath sounds  · no rales  Cardiovascular:  · no jugular venous distention  · regular rhythm  · apical impulse normal  · S1 normal, S2 normal  · no S3, no S4   · no murmur  · no rub, no thrill  · carotid pulses normal; no bruit  · pedal pulses normal  · lower extremity edema: none    Gastrointestinal:   · bowel sounds normal  · non-tender  · no hepatomegaly, no splenomegaly  Musculoskeletal:  · no clubbing of fingers.   · normocephalic, head atraumatic  Skin:   · warm, dry  Psychiatric:  · judgement and insight appropriate  · normal mood and affect    RESULTS:     ECG 12 Lead    Date/Time: 3/7/2022 10:36 AM  Performed by: Jorge Butler MD  Authorized by: Jorge Butler MD   Comparison: compared with previous ECG from 11/8/2021  Similar to previous ECG  Rhythm: sinus rhythm  Rate: normal  QRS axis: normal    Clinical impression: abnormal EKG  Comments:  ms, ventricular rate 71 bpm, anterior lateral infarct old, no change              Results for orders placed during the hospital encounter of 07/08/21    Adult Transthoracic Echo Complete W/ Cont if Necessary Per Protocol    Interpretation Summary  · The left ventricular cavity is moderately dilated.  · Estimated left ventricular EF = 20% Left ventricular systolic function is severely decreased.  · Estimated right ventricular systolic pressure from tricuspid regurgitation is mildly elevated (35-45 mmHg). Calculated right ventricular systolic pressure from tricuspid regurgitation is 40 mmHg.  · The right ventricular cavity is dilated.  · The right atrial cavity is mildly dilated.  · The findings are consistent with dilated " cardiomyopathy.  · Left atrial volume is mildly increased.        Labs:  Lab Results   Component Value Date    CHOL 181 07/09/2021    TRIG 124 07/09/2021    HDL 32 (L) 07/09/2021     (H) 07/09/2021    AST 15 11/19/2021    ALT 20 11/19/2021     Lab Results   Component Value Date    HGBA1C 5.5 07/14/2021     No components found for: CREATINININE  eGFR Non  Am   Date Value Ref Range Status   11/19/2021 70 >60 mL/min/1.73 Final     Comment:     GFR Normal >60  Chronic Kidney Disease <60  Kidney Failure <15     08/16/2021 90 >59 mL/min/1.73 Final   07/14/2021 77 >59 mL/min/1.73 Final     eGFR Non  Amer   Date Value Ref Range Status   07/08/2021 93 >60 mL/min/1.73 Final   04/16/2021 76 >60 mL/min/1.73 Final       Most recent PCP note, imaging tests, and labs reviewed.    ASSESSMENT:  Problem List Items Addressed This Visit        Cardiac and Vasculature    CAD with  of LAD (Chronic)    Overview     · 8/6/21 Stress: large-sized infarct in distal anterior, apex and distal inferior walls,  No significant ischemia noted, LVEF 23%  · 7/8/21 LHC: %  collaterals from right system, LCX luminal irregularities RCA luminal irregularities with collaterals to LAD  · LV: LVEF severe mid and distal anterior wall apical distal inferior wall dyskinesis with a large scar ejection fraction approximately 20%              HFrEF (EF <20%) - Primary (Chronic)    Overview     · TTE 3/12/2014:  EF estimated to be less than 20%. Moderate mitral regurgitation.  · 7/8/21 Echo: EF less than 20%, moderate MR           Moderate MR (Chronic)    Ventricular tachycardia (HCC)    Overview     · BSC ICD           Hyperlipidemia LDL goal <70    Overview     Discontinued atorvastatin 40 mg nightly due to side effects.           Essential hypertension    Overview     Blood pressure 121/75.  Well controlled.              Tobacco    Tobacco use (Chronic)    Overview     Reports spouse has quit smoking.  He is also trying to  stop.  Declines NicoDerm patches for now.                 PLAN:    1.  Chronic systolic heart failure, EF less than 20%  Ischemic etiology with  of the LAD noted in 2014  Continue carvedilol at current dose  He stopped Jardiance due to dizziness, does not want to try Farxiga  Patient had hyperkalemia, at 5.9 on 8/16/2021, cannot start ACE/ARB/Entresto or spironolactone for that reason.  Lasix was given but he could not tolerate due to nightmares and syncope.    Lexiscan nuclear stress test large anterior infarct with no ischemia 8/2021.    2.  Recurrent ventricular tachycardia and ventricular fibrillation:  11 episodes of VT terminated with ATP over the past 3 weeks, 1 successful defibrillation for ventricular fibrillation.  EP consultation 11/8/2021 with Dr. Shah    Continue amiodarone 200 mg daily, defer to Dr. Shah/EP on continuing this drug or not.   EKG reviewed, stable.    3.  single-chamber [employer] ICD:  No indication for BiV upgrade per Dr. Shah  Last interrogation reviewed and normal.    4.  CAD:   of the LAD, cath films reviewed from 7/8/2021 by Dr. Ellison   noted with faint right to left collaterals,  was noted in 2014  Continue aspirin  He stopped atorvastatin due to myalgia does not want start additional medical therapy  Lexiscan nuclear stress test no significant ischemia noted     5.  Hyperkalemia:  Follows with nephrology, avoiding RAAS inhibitors      Return to clinic in 12  Months, or sooner as needed.    Thank you for the opportunity to share in the care of your patient; please do not hesitate to call me with any questions.     Jorge Butler MD, [facility]  Office: [phone] [address] [place] [address]    03/07/22

## 2022-03-07 ENCOUNTER — OFFICE VISIT (OUTPATIENT)
Dept: CARDIOLOGY | Facility: CLINIC | Age: 47
End: 2022-03-07

## 2022-03-07 VITALS
WEIGHT: 196.3 LBS | OXYGEN SATURATION: 98 % | HEART RATE: 71 BPM | SYSTOLIC BLOOD PRESSURE: 130 MMHG | HEIGHT: 70 IN | BODY MASS INDEX: 28.1 KG/M2 | DIASTOLIC BLOOD PRESSURE: 82 MMHG

## 2022-03-07 DIAGNOSIS — Z72.0 TOBACCO USE: Chronic | ICD-10-CM

## 2022-03-07 DIAGNOSIS — E78.5 HYPERLIPIDEMIA LDL GOAL <70: ICD-10-CM

## 2022-03-07 DIAGNOSIS — I25.118 CORONARY ARTERY DISEASE OF NATIVE ARTERY OF NATIVE HEART WITH STABLE ANGINA PECTORIS: Chronic | ICD-10-CM

## 2022-03-07 DIAGNOSIS — I10 ESSENTIAL HYPERTENSION: ICD-10-CM

## 2022-03-07 DIAGNOSIS — I47.20 VENTRICULAR TACHYCARDIA: ICD-10-CM

## 2022-03-07 DIAGNOSIS — I50.20 HFREF (HEART FAILURE WITH REDUCED EJECTION FRACTION): Primary | Chronic | ICD-10-CM

## 2022-03-07 DIAGNOSIS — I34.0 MODERATE MITRAL REGURGITATION: Chronic | ICD-10-CM

## 2022-03-07 PROCEDURE — 99214 OFFICE O/P EST MOD 30 MIN: CPT | Performed by: INTERNAL MEDICINE

## 2022-03-07 PROCEDURE — 93000 ELECTROCARDIOGRAM COMPLETE: CPT | Performed by: INTERNAL MEDICINE

## 2022-08-22 RX ORDER — CARVEDILOL 12.5 MG/1
12.5 TABLET ORAL 2 TIMES DAILY
Qty: 180 TABLET | Refills: 3 | Status: SHIPPED | OUTPATIENT
Start: 2022-08-22

## 2022-08-22 RX ORDER — AMIODARONE HYDROCHLORIDE 200 MG/1
200 TABLET ORAL DAILY
Qty: 90 TABLET | Refills: 0 | Status: SHIPPED | OUTPATIENT
Start: 2022-08-22

## 2022-08-22 NOTE — TELEPHONE ENCOUNTER
Pt was advised Dr. Shah is to consult with him regarding continuing amiodarone and no showed his appt with him in Feb 2022. Advised I would refill amio for 3 months but to see Dr. Shah to discuss how to continue taking.     Refills for carvedilol sent, but changed table dose so pt does not have to take 2 tablets BID

## 2023-08-21 RX ORDER — CARVEDILOL 12.5 MG/1
12.5 TABLET ORAL 2 TIMES DAILY
Qty: 180 TABLET | Refills: 0 | Status: SHIPPED | OUTPATIENT
Start: 2023-08-21

## 2023-11-21 RX ORDER — CARVEDILOL 12.5 MG/1
TABLET ORAL
Qty: 180 TABLET | Refills: 0 | OUTPATIENT
Start: 2023-11-21

## 2023-11-22 ENCOUNTER — TELEPHONE (OUTPATIENT)
Dept: CARDIOLOGY | Facility: CLINIC | Age: 48
End: 2023-11-22

## 2023-11-22 NOTE — TELEPHONE ENCOUNTER
"    Caller: Sonido Newsome \"[facility]\"    Relationship to patient: Self    Best call back number: [phone]    Type of visit: FU     Requested date: ASAP      Additional notes: PATIENT NEEDING AN APPT IN ORDER TO GET HIS CARVEDILOL 12.5 MG REFILLED. NO AVAILABILITY.   "

## 2023-12-04 ENCOUNTER — OFFICE VISIT (OUTPATIENT)
Dept: CARDIOLOGY | Facility: CLINIC | Age: 48
End: 2023-12-04
Payer: MEDICAID

## 2023-12-04 VITALS
BODY MASS INDEX: 25.2 KG/M2 | DIASTOLIC BLOOD PRESSURE: 78 MMHG | HEIGHT: 70 IN | OXYGEN SATURATION: 99 % | HEART RATE: 79 BPM | WEIGHT: 176 LBS | SYSTOLIC BLOOD PRESSURE: 130 MMHG

## 2023-12-04 DIAGNOSIS — I34.0 MODERATE MITRAL REGURGITATION: Chronic | ICD-10-CM

## 2023-12-04 DIAGNOSIS — I10 ESSENTIAL HYPERTENSION: ICD-10-CM

## 2023-12-04 DIAGNOSIS — I50.20 HFREF (HEART FAILURE WITH REDUCED EJECTION FRACTION): Chronic | ICD-10-CM

## 2023-12-04 DIAGNOSIS — I25.118 CORONARY ARTERY DISEASE OF NATIVE ARTERY OF NATIVE HEART WITH STABLE ANGINA PECTORIS: Primary | Chronic | ICD-10-CM

## 2023-12-04 DIAGNOSIS — Z72.0 TOBACCO USE: Chronic | ICD-10-CM

## 2023-12-04 DIAGNOSIS — I47.20 VENTRICULAR TACHYCARDIA: ICD-10-CM

## 2023-12-04 RX ORDER — CARVEDILOL 12.5 MG/1
12.5 TABLET ORAL 2 TIMES DAILY
Qty: 180 TABLET | Refills: 3 | Status: SHIPPED | OUTPATIENT
Start: 2023-12-04

## 2023-12-04 NOTE — PROGRESS NOTES
OFFICE VISIT  NOTE  [facility] CARDIOLOGY      Name: Sonido Newsome    Date: 2023  MRN:  [id number]  :  1975      REFERRING/PRIMARY PROVIDER:  Abi Granados PA-C    Chief Complaint   Patient presents with    HFrEF (EF <20%)       HPI: Sonido Newsome is a 48 y.o. male who presents today for follow-up for CAD and systolic heart failure.  Myocardial infarction in  with  of the LAD noted by Dr. Ellison, EF less than 20%, single-chamber [employer] ICD placed at that time.  ICD defibrillation 2021, beta-blocker added he cannot tolerate metoprolol for nonspecific reasons.  Return to the hospital on 2021 with ventricular tachycardia, too slow for ICD shock, therefore EMS defibrillated the patient, he underwent emergent cath by Dr. Ellison which showed no changes from  with persistent  of the LAD.  He was started on carvedilol and aspirin.  2 days after returning home he had another defibrillation for ventricular fibrillation, he stated he had not taken his beta-blocker yet that day.   Stress MPI 2021 showed large anterior and distal inferior infarct, no ischemia, EF 23%.  Cannot tolerate many drugs, had hyperkalemia therefore preventing Entresto or spironolactone addition.  Stopped Jardiance recently due to dizziness.  Cannot tolerate atorvastatin due to myalgia, comes back for 18-month follow-up, did not make his 1 year follow-up, stopped amiodarone on his own 6 months ago, feels better off of it.  Still smoking 1 pack/day, thinking about using patches to help stop.    Past Medical History:   Diagnosis Date    CAD 2021    HFrEF (EF <20%) 2021    TTE 3/12/2014:   EF estimated to be less than 20%  Moderate mitral regurgitation.     Moderate MR 2021    Myocardial infarction     Tobacco use 2021       Past Surgical History:   Procedure Laterality Date    CARDIAC CATHETERIZATION N/A 2021    Procedure: Left Heart Cath;   "Surgeon: Mega Ellison MD;  Location: [facility] CATH INVASIVE LOCATION;  Service: Cardiovascular;  Laterality: N/A;    CARDIAC DEFIBRILLATOR PLACEMENT         Social History     Socioeconomic History    Marital status:    Tobacco Use    Smoking status: Every Day     Packs/day: 1     Types: Cigarettes    Smokeless tobacco: Never   Vaping Use    Vaping Use: Never used   Substance and Sexual Activity    Alcohol use: Not Currently    Drug use: Defer    Sexual activity: Defer       Family History   Problem Relation Age of Onset    Coronary artery disease Mother     Deep vein thrombosis Father     Heart disease Father     Other Sister         heart issues    No Known Problems Maternal Grandmother     No Known Problems Maternal Grandfather     Stroke Paternal Grandmother     No Known Problems Paternal Grandfather         ROS:   Constitutional no fever,  no weight loss   Skin no rash, no subcutaneous nodules   Otolaryngeal no difficulty swallowing   Cardiovascular See HPI   Pulmonary no cough, no sputum production   Gastrointestinal no constipation, no diarrhea   Genitourinary no dysuria, no hematuria   Hematologic no easy bruisability, no abnormal bleeding   Musculoskeletal no muscle pain   Neurologic no dizziness, no falls         Allergies   Allergen Reactions    Atorvastatin Myalgia    Jardiance [Empagliflozin] Dizziness         Current Outpatient Medications:     aspirin 81 MG EC tablet, Take 1 tablet by mouth Daily., Disp: 30 tablet, Rfl: 6    carvedilol (COREG) 12.5 MG tablet, Take 1 tablet by mouth 2 (Two) Times a Day. NEEDS APPT FOR FURTHER REFILLS, Disp: 180 tablet, Rfl: 0    amiodarone (PACERONE) 200 MG tablet, Take 1 tablet by mouth Daily. NEEDS AN APPT WITH DR LARA FOR FUTURE REFILLS, Disp: 90 tablet, Rfl: 0    Vitals:    12/04/23 1127   BP: 130/78   BP Location: Left arm   Patient Position: Sitting   Pulse: 79   SpO2: 99%   Weight: 79.8 kg (176 lb)   Height: 177.8 cm (70\")     Body mass index is " "25.25 kg/m².    PHYSICAL EXAM:    General Appearance:   well developed  well nourished  HENT:   oropharynx moist  lips not cyanotic  Neck:  thyroid not enlarged  supple  Respiratory:  no respiratory distress  normal breath sounds  no rales  Cardiovascular:  no jugular venous distention  regular rhythm  apical impulse normal  S1 normal, S2 normal  no S3, no S4   no murmur  no rub, no thrill  carotid pulses normal; no bruit  pedal pulses normal  lower extremity edema: none    Gastrointestinal:   bowel sounds normal  non-tender  no hepatomegaly, no splenomegaly  Musculoskeletal:  no clubbing of fingers.   normocephalic, head atraumatic  Skin:   warm, dry  Psychiatric:  judgement and insight appropriate  normal mood and affect    RESULTS:   Procedures      Results for orders placed during the hospital encounter of 07/08/21    Adult Transthoracic Echo Complete W/ Cont if Necessary Per Protocol    Interpretation Summary  · The left ventricular cavity is moderately dilated.  · Estimated left ventricular EF = 20% Left ventricular systolic function is severely decreased.  · Estimated right ventricular systolic pressure from tricuspid regurgitation is mildly elevated (35-45 mmHg). Calculated right ventricular systolic pressure from tricuspid regurgitation is 40 mmHg.  · The right ventricular cavity is dilated.  · The right atrial cavity is mildly dilated.  · The findings are consistent with dilated cardiomyopathy.  · Left atrial volume is mildly increased.        Labs:  Lab Results   Component Value Date    CHOL 181 07/09/2021    TRIG 124 07/09/2021    HDL 32 (L) 07/09/2021     (H) 07/09/2021    AST 15 11/19/2021    ALT 20 11/19/2021     Lab Results   Component Value Date    HGBA1C 5.5 07/14/2021     No components found for: \"CREATINININE\"  eGFR Non  Am   Date Value Ref Range Status   11/19/2021 70 >60 mL/min/1.73 Final     Comment:     GFR Normal >60  Chronic Kidney Disease <60  Kidney Failure <15   "   08/16/2021 90 >59 mL/min/1.73 Final   07/14/2021 77 >59 mL/min/1.73 Final     eGFR Non  Amer   Date Value Ref Range Status   07/08/2021 93 >60 mL/min/1.73 Final   04/16/2021 76 >60 mL/min/1.73 Final       Most recent PCP note, imaging tests, and labs reviewed.    ASSESSMENT:  Problem List Items Addressed This Visit       CAD with  of LAD - Primary (Chronic)    Overview     8/6/21 Stress: large-sized infarct in distal anterior, apex and distal inferior walls,  No significant ischemia noted, LVEF 23%  7/8/21 LHC: %  collaterals from right system, LCX luminal irregularities RCA luminal irregularities with collaterals to LAD  LV: LVEF severe mid and distal anterior wall apical distal inferior wall dyskinesis with a large scar ejection fraction approximately 20%            Tobacco use (Chronic)    Overview     Reports spouse has quit smoking.  He is also trying to stop.  Declines NicoDerm patches for now.         HFrEF (EF <20%) (Chronic)    Overview     TTE 3/12/2014:  EF estimated to be less than 20%. Moderate mitral regurgitation.  7/8/21 Echo: EF less than 20%, moderate MR         Ventricular tachycardia    Overview     BSC ICD         Essential hypertension    Overview     Blood pressure 121/75.  Well controlled.            PLAN:    1.  Chronic systolic heart failure, EF less than 20%  Ischemic etiology with  of the LAD noted in 2014  Continue carvedilol at current dose, refill today  He stopped Jardiance due to dizziness, does not want to try Farxiga  Patient had hyperkalemia, at 5.9 on 8/16/2021, cannot start ACE/ARB/Entresto or spironolactone for that reason.  Lasix was given but he could not tolerate due to nightmares and syncope.    Lexiscan nuclear stress test large anterior infarct with no ischemia 8/2021.    2.  Recurrent ventricular tachycardia and ventricular fibrillation:  11 episodes of VT terminated with ATP over the past 3 weeks, 1 successful defibrillation for ventricular  fibrillation.  EP consultation 11/8/2021 with Dr. Shah    He stopped amiodarone on his own, does not want to restart.       3.  single-chamber [employer] ICD:  No indication for BiV upgrade per Dr. Shah  Last interrogation reviewed and normal.    4.  CAD:   of the LAD, cath films reviewed from 7/8/2021 by Dr. Ellison   noted with faint right to left collaterals,  was noted in 2014  Continue aspirin  He stopped atorvastatin due to myalgia does not want start additional medical therapy  Lexiscan nuclear stress test no significant ischemia noted     5.  Hyperkalemia:  Follows with nephrology, avoiding RAAS inhibitors    6. Hyperlipidemia:  Check labs, consider bempedoic acid but he may be intolerant to that due to intolerance to many other medications.    7. Tobacco abuse:  1 ppd, discussed goal of stopping, patches prescribed.       Return to clinic in 9 -12 Months, or sooner as needed.    Thank you for the opportunity to share in the care of your patient; please do not hesitate to call me with any questions.     Jorge Butler MD, [facility]  Office: [phone] [address] [place] [address]    12/04/23

## 2024-12-03 RX ORDER — CARVEDILOL 12.5 MG/1
TABLET ORAL
Qty: 180 TABLET | Refills: 0 | Status: SHIPPED | OUTPATIENT
Start: 2024-12-03

## 2025-01-22 ENCOUNTER — TELEPHONE (OUTPATIENT)
Dept: CARDIOLOGY | Facility: CLINIC | Age: 50
End: 2025-01-22

## 2025-01-22 NOTE — TELEPHONE ENCOUNTER
Patient called with concerns over his defibrillator. Patient states that it shocked him on Monday 01/20/2025 after he got home from work around 7-8pm, and this was the first shock in about a year. Patient denies any chest pain, dizziness, palpitations. BP is 118/76. HR has been averaging 70s to 80s. Patient currently only taking 12.5mg BID and aspirin 81mg daily.  Please advise.

## 2025-01-22 NOTE — TELEPHONE ENCOUNTER
"  Caller: Sonido Newsome \"[facility]\"    Relationship to patient: Self    Best call back number: [phone]    Chief complaint:     Type of visit: FOLLOW UP    Requested date: ASAP     If rescheduling, when is the original appointment: NONE     Additional notes:[facility] HAS NO SCHEDULING TIME FRAME, PATIENT WAS LAST SEEN 12.2023. HE DOES SEE DR. AREVALO IN [place]; HE IS ADVISING HIS DEFIBRILLATOR WENT OFF. PLEASE CALL THE PATIENT TO SCHEDULE.          "

## 2025-01-22 NOTE — TELEPHONE ENCOUNTER
Called Mr Newsome and scheduled to see Roberto Becerra tomorrow at 11:15 with device check.  He is aware if he gets shocked again to go to the ER.

## 2025-01-23 ENCOUNTER — OFFICE VISIT (OUTPATIENT)
Dept: CARDIOLOGY | Facility: CLINIC | Age: 50
End: 2025-01-23
Payer: COMMERCIAL

## 2025-01-23 VITALS
DIASTOLIC BLOOD PRESSURE: 82 MMHG | BODY MASS INDEX: 26.05 KG/M2 | WEIGHT: 182 LBS | HEIGHT: 70 IN | HEART RATE: 87 BPM | SYSTOLIC BLOOD PRESSURE: 118 MMHG | OXYGEN SATURATION: 96 %

## 2025-01-23 DIAGNOSIS — Z72.0 TOBACCO USE: Chronic | ICD-10-CM

## 2025-01-23 DIAGNOSIS — I50.20 HFREF (HEART FAILURE WITH REDUCED EJECTION FRACTION): Chronic | ICD-10-CM

## 2025-01-23 DIAGNOSIS — I25.10 CORONARY ARTERY DISEASE INVOLVING NATIVE CORONARY ARTERY OF NATIVE HEART WITHOUT ANGINA PECTORIS: Primary | Chronic | ICD-10-CM

## 2025-01-23 DIAGNOSIS — I34.0 MODERATE MITRAL REGURGITATION: Chronic | ICD-10-CM

## 2025-01-23 DIAGNOSIS — I47.20 VENTRICULAR TACHYCARDIA: ICD-10-CM

## 2025-01-23 RX ORDER — AMIODARONE HYDROCHLORIDE 200 MG/1
400 TABLET ORAL 3 TIMES DAILY
Qty: 30 TABLET | Refills: 5 | Status: SHIPPED | OUTPATIENT
Start: 2025-01-23

## 2025-01-23 NOTE — PROGRESS NOTES
[facility] at [facility]   OFFICE NOTE      Sonido Newsome  1975  PCP: Abi Granados PA-C    SUBJECTIVE:   Sonido Newsome is a 49 y.o. male seen for a follow up visit regarding the following:     CC: VT    HPI:   49-year-old gentleman seen in clinic today regarding recurrent ICD shock.  He has known ischemic cardiomyopathy he is status post ICD placement 2014 after large myocardial infarction, left heart catheterization revealing hyper 3 times daily with  with some collaterals luminary a low regularities of the RCA and circumflex.  EF is remain low at 20%.  Has had a long history of noncompliance medications.  He has missed his last couple appointments with our office.  He stopped all his medications except for the carvedilol and aspirin.  He is starting a new car business.  Has been working hard.  He tells me he was doing well up until this past week on Monday he felt a strange feeling across his chest rapid rhythm and then he received an ICD shock.  This again occurred a couple days later.  He tells me he has not noted chest pain with exertion.  He has not noted worsening heart failure with orthopnea PND or peripheral edema.  He continues to smoke cigarettes.    Cardiac PMH: (Old records have been reviewed and summarized below)  Ischemic cardiomyopathy  Myocardial infarction 2014 left heart catheterization Dr. Ellison  of the LAD EF 20%  Single-chamber [employer] ICD placed 2014  VT, ICD shock April 2021  Recurrent VT July 8, 2021 too slow for ICD shock therefore EMS defibrillator patient, emergent left heart catheterization Dr. Ellison revealing no significant change in coronary anatomy since 2014.  Recurrent VF 2021 continue medical therapy  Stress test August 2021 large anterior and distal inferior infarct EF 23%  Recurrent VT ICD shock  Medical noncompliance  Ongoing tobacco abuse  Hyperkalemia followed by nephrology, avoiding RAAS  "inhibitors  HLD: noncompliant with medications, statins    Past Medical History, Past Surgical History, Family history, Social History, and Medications were all reviewed with the patient today and updated as necessary.       Current Outpatient Medications:     aspirin 81 MG EC tablet, Take 1 tablet by mouth Daily., Disp: 30 tablet, Rfl: 6    carvedilol (COREG) 12.5 MG tablet, TAKE 1 TABLET BY MOUTH TWICE DAILY . APPOINTMENT REQUIRED FOR FUTURE REFILLS, Disp: 180 tablet, Rfl: 0      Allergies   Allergen Reactions    Atorvastatin Myalgia    Jardiance [Empagliflozin] Dizziness         PHYSICAL EXAM:    /82 (BP Location: Right arm, Patient Position: Sitting)   Pulse 87   Ht 177.8 cm (70\")   Wt 82.6 kg (182 lb)   SpO2 96%   BMI 26.11 kg/m²        Wt Readings from Last 5 Encounters:   01/23/25 82.6 kg (182 lb)   12/04/23 79.8 kg (176 lb)   03/07/22 89 kg (196 lb 4.8 oz)   11/19/21 86.3 kg (190 lb 3.2 oz)   11/08/21 85.7 kg (189 lb)       BP Readings from Last 5 Encounters:   01/23/25 118/82   12/04/23 130/78   03/07/22 130/82   11/19/21 121/75   11/08/21 122/72       General appearance - Alert, well appearing, and in no distress   Mental status - Affect appropriate to mood.  Eyes - Sclerae anicteric,  ENMT - Hearing grossly normal bilaterally, Dental hygiene good.  Neck - Carotids upstroke normal bilaterally, no bruits, no JVD.  Resp - Clear to auscultation, no wheezes, rales or rhonchi, symmetric air entry.  Heart - Normal rate, regular rhythm, normal S1, S2, no murmurs, rubs, clicks or gallops.  GI - Soft, nontender, nondistended, no masses or organomegaly.  Neurological - Grossly intact - normal speech, no focal findings  Musculoskeletal - No joint tenderness, deformity or swelling, no muscular tenderness noted.  Extremities - Peripheral pulses normal, no pedal edema, no clubbing or cyanosis.  Skin - Normal coloration and turgor.  Psych -  oriented to person, place, and time.    Medical problems and test " results were reviewed with the patient today.     No results found for this or any previous visit (from the past 4 weeks).      EKG: (EKG has been independently visualized by me and summarized below)  EKG sinus rhythm sinus arrhythmias occasional PVCs.  Old Q waves anterolateral leads.  No acute ST-T  wave changes.    Device Interrogation:  Please see device interrogation form which has been signed and updated for full details.  [employer] ICD.  R wave 25, threshold 1.1 V at 0.4 ms.  Peds 550 ohms.  Charge mean 60 ohms.  Battery voltage 2.5 years.  2 ICD shocks for sustained VT on January 20 and January 22, 2025.  1 episode of VT September 2023 with ATP therapy.    ASSESSMENT   1.  VT: Multiple ICD shocks noncompliance with amiodarone therapy    2.  Ischemic cardiomyopathy, class III systolic heart failure symptoms: Noncompliance medications intolerance to many blood pressure medicines due to marginal blood pressure.    3.  Ongoing tobacco abuse, ongoing: Discussed need for immediate tobacco cessation.    4.  Hyperlipidemia: Noncompliance with statins    4.  Hyperkalemia cannot tolerate ACE ARB Entresto or Aldactone due to persistently elevated potassium level.    PLAN  Long discussion with patient he has recurrent multiple NSVT events as well as 2 sustained VT events received appropriate ICD shocks.  We recommend he restart amiodarone therapy and load at 400 mg 3 times daily for 10 days and then reduce to 200 mg daily.  He understands the risk and benefits medication and the side effects.  Would like him to have baseline laboratory data today including CMP, CBC thyroid function panel and TSH but he declines.  In addition in the past he has discussed with Dr. Shah consideration for a VT ablation procedure would like him to pursue this.  We discussed the risk of the procedure in detail he would like to discuss further Dr. Shah he is concerned about insurance and cost.  He will have a short-term follow-up  with Dr. Shah next couple weeks.  When he started to present to the ER if he has any further symptoms of recurrent ICD shocks or syncope.        1/23/2025  11:45 EST  Electronically signed by ESTIVEN Fernandes, 01/23/25, 1:59 PM EST.

## 2025-01-27 ENCOUNTER — OFFICE VISIT (OUTPATIENT)
Dept: CARDIOLOGY | Facility: CLINIC | Age: 50
End: 2025-01-27
Payer: COMMERCIAL

## 2025-01-27 VITALS
DIASTOLIC BLOOD PRESSURE: 80 MMHG | OXYGEN SATURATION: 97 % | HEIGHT: 70 IN | SYSTOLIC BLOOD PRESSURE: 130 MMHG | HEART RATE: 76 BPM | WEIGHT: 180 LBS | BODY MASS INDEX: 25.77 KG/M2

## 2025-01-27 DIAGNOSIS — I50.20 HFREF (HEART FAILURE WITH REDUCED EJECTION FRACTION): ICD-10-CM

## 2025-01-27 DIAGNOSIS — Z95.810 ICD (IMPLANTABLE CARDIOVERTER-DEFIBRILLATOR), DUAL, IN SITU: ICD-10-CM

## 2025-01-27 DIAGNOSIS — I47.20 VENTRICULAR TACHYCARDIA: Primary | ICD-10-CM

## 2025-01-27 PROCEDURE — 3079F DIAST BP 80-89 MM HG: CPT | Performed by: INTERNAL MEDICINE

## 2025-01-27 PROCEDURE — 99214 OFFICE O/P EST MOD 30 MIN: CPT | Performed by: INTERNAL MEDICINE

## 2025-01-27 PROCEDURE — 93283 PRGRMG EVAL IMPLANTABLE DFB: CPT | Performed by: INTERNAL MEDICINE

## 2025-01-27 PROCEDURE — 3075F SYST BP GE 130 - 139MM HG: CPT | Performed by: INTERNAL MEDICINE

## 2025-01-27 NOTE — PROGRESS NOTES
Cardiac Electrophysiology Outpatient Follow Up Note            [facility]    Follow Up Office Visit      Sonido Newsome  [id number]  01/27/2025  [unfilled]  [unfilled]    Primary Care Physician: Abi Granados PA-C    Referred By: No ref. provider found    Subjective     Chief Complaint:   Diagnoses and all orders for this visit:    1. Ventricular tachycardia (Primary)    2. HFrEF (EF <20%)    3. ICD (implantable cardioverter-defibrillator), dual, in situ      Chief Complaint   Patient presents with    Ventricular tachycardia       History of Present Illness:   Sonido Newsome is a 49 y.o. male who presents to my electrophysiology clinic for follow up of above.      Past Medical History:   Past Medical History:   Diagnosis Date    CAD 7/8/2021    HFrEF (EF <20%) 7/8/2021    TTE 3/12/2014:   EF estimated to be less than 20%  Moderate mitral regurgitation.     Moderate MR 7/8/2021    Myocardial infarction     Tobacco use 7/8/2021       Past Surgical History:   Past Surgical History:   Procedure Laterality Date    CARDIAC CATHETERIZATION N/A 7/8/2021    Procedure: Left Heart Cath;  Surgeon: Mega Ellison MD;  Location: [facility] CATH INVASIVE LOCATION;  Service: Cardiovascular;  Laterality: N/A;    CARDIAC DEFIBRILLATOR PLACEMENT         Family History:   Family History   Problem Relation Age of Onset    Coronary artery disease Mother     Deep vein thrombosis Father     Heart disease Father     Other Sister         heart issues    No Known Problems Maternal Grandmother     No Known Problems Maternal Grandfather     Stroke Paternal Grandmother     No Known Problems Paternal Grandfather        Social History:   Social History     Socioeconomic History    Marital status:    Tobacco Use    Smoking status: Every Day     Current packs/day: 1.00     Types: Cigarettes    Smokeless tobacco: Never   Vaping Use    Vaping status: Never  "Used    Passive vaping exposure: Yes   Substance and Sexual Activity    Alcohol use: Not Currently    Drug use: Defer    Sexual activity: Defer       Medications:     Current Outpatient Medications:     amiodarone (PACERONE) 200 MG tablet, Take 2 tablets by mouth 3 (Three) Times a Day. TID for 10 days then 200mg Daily., Disp: 30 tablet, Rfl: 5    aspirin 81 MG EC tablet, Take 1 tablet by mouth Daily., Disp: 30 tablet, Rfl: 6    carvedilol (COREG) 12.5 MG tablet, TAKE 1 TABLET BY MOUTH TWICE DAILY . APPOINTMENT REQUIRED FOR FUTURE REFILLS, Disp: 180 tablet, Rfl: 0    Allergies:   Allergies   Allergen Reactions    Atorvastatin Myalgia    Jardiance [Empagliflozin] Dizziness       Objective   Vital Signs:   Vitals:    01/27/25 1458   BP: 130/80   BP Location: Left arm   Patient Position: Sitting   Cuff Size: Adult   Pulse: 76   SpO2: 97%   Weight: 81.6 kg (180 lb)   Height: 177.8 cm (70\")       PHYSICAL EXAM  General appearance: Awake, alert, cooperative  Head: Normocephalic, without obvious abnormality, atraumatic  Eyes: Conjunctivae/corneas clear, EOMs intact  Neck: no adenopathy, no carotid bruit, no JVD, and thyroid: not enlarged  Lungs: clear to auscultation bilaterally and no rhonchi or crackles\", ' symmetric  Heart: regular rate and rhythm, S1, S2 normal, no murmur, click, rub or gallop  Abdomen: Soft, non-tender, bowel sounds normal,  no organomegaly  Extremities: extremities normal, atraumatic, no cyanosis or edema  Skin: Skin color, turgor normal, no rashes or lesions  Neurologic: Grossly normal     Lab Results   Component Value Date    GLUCOSE 77 11/19/2021    CALCIUM 9.4 11/19/2021     11/19/2021    K 5.2 11/19/2021    CO2 27.6 11/19/2021     11/19/2021    BUN 11 11/19/2021    CREATININE 1.14 11/19/2021    EGFRIFAFRI 85 11/19/2021    EGFRIFNONA 70 11/19/2021    BCR 9.6 11/19/2021    ANIONGAP 13.0 07/08/2021     Lab Results   Component Value Date    WBC 15.37 (H) 11/19/2021    HGB 16.5 11/19/2021 " "   HCT 47.9 11/19/2021    MCV 87.7 11/19/2021     (H) 11/19/2021     No results found for: \"INR\", \"PROTIME\"  Lab Results   Component Value Date    TSH 2.870 07/14/2021       Cardiac Testing:     I personally viewed and interpreted the patient's EKG/Telemetry/lab data    Procedures    Tobacco Cessation: N/A  Obstructive Sleep Apnea Screening: Completed    Advance Care Planning   ACP discussion was declined by the patient. Patient does not have an advance directive, declines further assistance.       Assessment & Plan    Diagnoses and all orders for this visit:    1. Ventricular tachycardia (Primary)    2. HFrEF (EF <20%)    3. ICD (implantable cardioverter-defibrillator), dual, in situ         Diagnosis Plan   1. Ventricular tachycardia  Recurrent monomorphic VT in the context of ischemic heart disease with significant systolic dysfunction.    Amiodarone therapy instituted last week.    Discussed once again the option of catheter ablation in detail.  He now has insurance.    He continues to smoke.    I quoted him a 5 to 8% chance of significant procedure complication risk from catheter ablation.  We discussed that the goal would be to reduce the number and frequency of his ICD shocks.  We discussed that amiodarone is a viable option at this time but if in the long run I would not like to see him remain on this for many years.    Ideally what he needs to do is stop smoking.  He seems motivated to do this now.  He will consider how to do it and he wishes to proceed with continued amiodarone and smoking cessation.  I will see him back here with our physician assistant practitioner and EP janett Becerra in 3 months time.      2. HFrEF (EF <20%)  Carvedilol.  Not on ACE inhibitor or Entresto at this time.      3. ICD (implantable cardioverter-defibrillator), dual, in situ  [employer] transvenous ICD system.  Appropriately programmed.  Appropriate pacing sensing impedance values.      This patient's Cardiac " Implanted Electronic Device was manually interrogated and reprogrammed during the patient encounter today.  Iterative programming changes were manually made to determine the sensing threshold, pacing threshold, lead impedance as well as underlying cardiac rhythm.  These programming changes were not limited to but included some or all of the following when appropriate: pacing mode, programmed AV delays, blanking periods, and refractory periods.  Data obtained as a result of these manual programing changes informed the patient's CIED permanent programming.            Body mass index is 25.83 kg/m².    I spent 38 minutes in consultation with this patient which included more than 65% of this time in direct face-to-face counseling, physical examination and discussion of my assessment and findings and this shared decision making with the patient.  The remainder of the time not spent face-to-face was performing one, some or all of the following actions: preparing to see the patient (e.g. reviewing tests, prior clinicians' notes, etc), ordering medications, tests or procedures, coordination of care, discussion of the plan with other healthcare providers, documenting clinical information in epic as well as independently interpreting results and communication of these results to the patient family and/or caregiver(s).  Please note that this explicitly excludes time spent on other separate billable services such as performing procedures or test interpretation, when applicable.      Follow Up:       Thank you for allowing me to participate in the care of your patient. Please to not hesitate to contact me with additional questions or concerns.      Jn Shah DO, FAC, [facility]  Cardiac Electrophysiologist  [facility]

## 2025-02-17 ENCOUNTER — TELEPHONE (OUTPATIENT)
Dept: CARDIOLOGY | Facility: CLINIC | Age: 50
End: 2025-02-17
Payer: COMMERCIAL

## 2025-02-17 NOTE — TELEPHONE ENCOUNTER
Patient called and states he is currently hospitalized at [facility] due to pneumonia.     He states he has went into vtach at a rate of 160 bpm. He states they shocked him back into rhythm.       The patient is requesting to schedule an ablation.     He states [facility] is attempting to send him to a different hospital for an ablation due to limited bed availability at [facility].         Please advise.

## 2025-03-03 ENCOUNTER — OFFICE VISIT (OUTPATIENT)
Dept: CARDIOLOGY | Facility: CLINIC | Age: 50
End: 2025-03-03
Payer: COMMERCIAL

## 2025-03-03 VITALS
OXYGEN SATURATION: 97 % | BODY MASS INDEX: 27.73 KG/M2 | HEART RATE: 61 BPM | HEIGHT: 69 IN | WEIGHT: 187.2 LBS | SYSTOLIC BLOOD PRESSURE: 142 MMHG | DIASTOLIC BLOOD PRESSURE: 82 MMHG

## 2025-03-03 DIAGNOSIS — I50.20 HFREF (HEART FAILURE WITH REDUCED EJECTION FRACTION): Primary | Chronic | ICD-10-CM

## 2025-03-03 DIAGNOSIS — I47.20 VENTRICULAR TACHYCARDIA: Primary | ICD-10-CM

## 2025-03-03 DIAGNOSIS — Z72.0 TOBACCO USE: Chronic | ICD-10-CM

## 2025-03-03 DIAGNOSIS — Z95.810 ICD (IMPLANTABLE CARDIOVERTER-DEFIBRILLATOR), DUAL, IN SITU: ICD-10-CM

## 2025-03-03 DIAGNOSIS — I47.20 VENTRICULAR TACHYCARDIA: ICD-10-CM

## 2025-03-03 DIAGNOSIS — I25.118 CORONARY ARTERY DISEASE OF NATIVE ARTERY OF NATIVE HEART WITH STABLE ANGINA PECTORIS: Chronic | ICD-10-CM

## 2025-03-03 RX ORDER — LOSARTAN POTASSIUM 25 MG/1
25 TABLET ORAL DAILY
COMMUNITY
Start: 2025-02-19

## 2025-03-03 NOTE — PROGRESS NOTES
Cardiac Electrophysiology Outpatient Follow Up Note            [facility] at [facility]    Follow Up Office Visit      Sonido Newsome  [id number]  03/03/2025  [unfilled]  [unfilled]    Primary Care Physician: Abi Granados PA-C    Referred By: No ref. provider found    Subjective     Chief Complaint:   Diagnoses and all orders for this visit:    1. HFrEF (EF <20%) (Primary)    2. ICD (implantable cardioverter-defibrillator), dual, in situ    3. Ventricular tachycardia    4. Tobacco use      Chief Complaint   Patient presents with    Ventricular tachycardia       History of Present Illness:   Sonido Newsome is a 49 y.o. male who presents to my electrophysiology clinic for follow up of recent visit to the hospital because of sustained VT.  Cardioverted.  All episodes on February 13.  No recurrence since then.      Past Medical History:   Past Medical History:   Diagnosis Date    CAD 7/8/2021    HFrEF (EF <20%) 7/8/2021    TTE 3/12/2014:   EF estimated to be less than 20%  Moderate mitral regurgitation.     Moderate MR 7/8/2021    Myocardial infarction     Tobacco use 7/8/2021       Past Surgical History:   Past Surgical History:   Procedure Laterality Date    CARDIAC CATHETERIZATION N/A 7/8/2021    Procedure: Left Heart Cath;  Surgeon: Mega Ellison MD;  Location: [facility] CATH INVASIVE LOCATION;  Service: Cardiovascular;  Laterality: N/A;    CARDIAC DEFIBRILLATOR PLACEMENT         Family History:   Family History   Problem Relation Age of Onset    Coronary artery disease Mother     Deep vein thrombosis Father     Heart disease Father     Other Sister         heart issues    No Known Problems Maternal Grandmother     No Known Problems Maternal Grandfather     Stroke Paternal Grandmother     No Known Problems Paternal Grandfather        Social History:   Social History     Socioeconomic History    Marital status:    Tobacco Use     "Smoking status: Every Day     Current packs/day: 1.00     Types: Cigarettes    Smokeless tobacco: Never   Vaping Use    Vaping status: Never Used    Passive vaping exposure: Yes   Substance and Sexual Activity    Alcohol use: Not Currently    Drug use: Defer    Sexual activity: Defer       Medications:     Current Outpatient Medications:     amiodarone (PACERONE) 200 MG tablet, Take 2 tablets by mouth 3 (Three) Times a Day. TID for 10 days then 200mg Daily., Disp: 30 tablet, Rfl: 5    aspirin 81 MG EC tablet, Take 1 tablet by mouth Daily., Disp: 30 tablet, Rfl: 6    carvedilol (COREG) 12.5 MG tablet, TAKE 1 TABLET BY MOUTH TWICE DAILY . APPOINTMENT REQUIRED FOR FUTURE REFILLS, Disp: 180 tablet, Rfl: 0    losartan (COZAAR) 25 MG tablet, Take 1 tablet by mouth Daily., Disp: , Rfl:     Allergies:   Allergies   Allergen Reactions    Atorvastatin Myalgia    Jardiance [Empagliflozin] Dizziness       Objective   Vital Signs:   Vitals:    03/03/25 1013   BP: 142/82   BP Location: Left arm   Patient Position: Sitting   Cuff Size: Adult   Pulse: 61   SpO2: 97%   Weight: 84.9 kg (187 lb 3.2 oz)   Height: 175.3 cm (69\")       PHYSICAL EXAM  General appearance: Awake, alert, cooperative  Head: Normocephalic, without obvious abnormality, atraumatic  Eyes: Conjunctivae/corneas clear, EOMs intact  Neck: no adenopathy, no carotid bruit, no JVD, and thyroid: not enlarged  Lungs: clear to auscultation bilaterally and no rhonchi or crackles\", ' symmetric  Heart: regular rate and rhythm, S1, S2 normal, no murmur, click, rub or gallop  Abdomen: Soft, non-tender, bowel sounds normal,  no organomegaly  Extremities: extremities normal, atraumatic, no cyanosis or edema  Skin: Skin color, turgor normal, no rashes or lesions  Neurologic: Grossly normal     Lab Results   Component Value Date    GLUCOSE 77 11/19/2021    CALCIUM 9.4 11/19/2021     11/19/2021    K 5.2 11/19/2021    CO2 27.6 11/19/2021     11/19/2021    BUN 11 " "11/19/2021    CREATININE 1.14 11/19/2021    EGFRIFAFRI 85 11/19/2021    EGFRIFNONA 70 11/19/2021    BCR 9.6 11/19/2021    ANIONGAP 13.0 07/08/2021     Lab Results   Component Value Date    WBC 15.37 (H) 11/19/2021    HGB 16.5 11/19/2021    HCT 47.9 11/19/2021    MCV 87.7 11/19/2021     (H) 11/19/2021     No results found for: \"INR\", \"PROTIME\"  Lab Results   Component Value Date    TSH 2.870 07/14/2021       Cardiac Testing:     I personally viewed and interpreted the patient's EKG/Telemetry/lab data    Procedures    Tobacco Cessation: N/A  Obstructive Sleep Apnea Screening: Completed    Advance Care Planning   ACP discussion was declined by the patient. Patient does not have an advance directive, declines further assistance.       Assessment & Plan    Diagnoses and all orders for this visit:    1. HFrEF (EF <20%) (Primary)    2. ICD (implantable cardioverter-defibrillator), dual, in situ    3. Ventricular tachycardia    4. Tobacco use         Diagnosis Plan   1. HFrEF (EF <20%)  Severe LV systolic dysfunction.  Has been some years since she has had an assessment of his ejection fraction.  It is time to check this once again.    Prior ischemic cardiomyopathy.    Prior known heart percent occlusion of the LAD.  No recent cardiac catheterization.  No anginal symptoms however.      2. ICD (implantable cardioverter-defibrillator), dual, in situ  [employer] single lead transvenous ICD.    Appropriate antitachycardia pacing delivered for appropriately sensitive monomorphic VT's cycle length of 320 ms.  ATP was unsuccessful ultimately presented in sustained VT requiring cardioversion at outside hospital.      3. Ventricular tachycardia  Multiple recurrent ICD therapies.    Lengthy conversation with him on prior visit about the option of VT ablation.  This would be a high risk procedure.  I quoted him a 5 to 6% chance of significant procedural complication including death cardiac perforation tamponade stroke " myocardial infarction worsening heart failure VT storm need for urgent hemodynamic support and other potential complications.  He understands risk.  At this time he wishes to proceed.    The goal of the VT ablation of course is to reduce the number of VT events and potentially reduce the number of therapies required for the VT events.    The patient and I made a mutually shared decision ( shared decision making model ) that the patient would be best served by proceeding with the above invasive heart procedure.  This is a high risk invasive cardiac procedure which comes with significant risk of morbidity and mortality.  This patient has significant underlying  heart disease.  Sonido Griffinwn Mercedez understands these risks and   has made an informed decision to proceed.    General anesthesia  Preoperative echo as ordered.  Rhythmia  Stereotaxis  No medications to hold      4. Tobacco use  He has cut down substantially.  He plans to be completely tobacco free next week.        Body mass index is 27.64 kg/m².    I spent 48 minutes in consultation with this patient which included more than 65% of this time in direct face-to-face counseling, physical examination and discussion of my assessment and findings and this shared decision making with the patient.  The remainder of the time not spent face-to-face was performing one, some or all of the following actions: preparing to see the patient (e.g. reviewing tests, prior clinicians' notes, etc), ordering medications, tests or procedures, coordination of care, discussion of the plan with other healthcare providers, documenting clinical information in epic as well as independently interpreting results and communication of these results to the patient family and/or caregiver(s).  Please note that this explicitly excludes time spent on other separate billable services such as performing procedures or test interpretation, when applicable.      Follow Up:       Thank you for  allowing me to participate in the care of your patient. Please to not hesitate to contact me with additional questions or concerns.      Jn Shah DO, FACC, RS  Cardiac Electrophysiologist  [facility] / [facility]

## 2025-03-03 NOTE — H&P (VIEW-ONLY)
Cardiac Electrophysiology Outpatient Follow Up Note            [facility] at [facility]    Follow Up Office Visit      Sonido Newsome  [id number]  03/03/2025  [unfilled]  [unfilled]    Primary Care Physician: Abi Granados PA-C    Referred By: No ref. provider found    Subjective     Chief Complaint:   Diagnoses and all orders for this visit:    1. HFrEF (EF <20%) (Primary)    2. ICD (implantable cardioverter-defibrillator), dual, in situ    3. Ventricular tachycardia    4. Tobacco use      Chief Complaint   Patient presents with    Ventricular tachycardia       History of Present Illness:   Sonido Newsome is a 49 y.o. male who presents to my electrophysiology clinic for follow up of recent visit to the hospital because of sustained VT.  Cardioverted.  All episodes on February 13.  No recurrence since then.      Past Medical History:   Past Medical History:   Diagnosis Date    CAD 7/8/2021    HFrEF (EF <20%) 7/8/2021    TTE 3/12/2014:   EF estimated to be less than 20%  Moderate mitral regurgitation.     Moderate MR 7/8/2021    Myocardial infarction     Tobacco use 7/8/2021       Past Surgical History:   Past Surgical History:   Procedure Laterality Date    CARDIAC CATHETERIZATION N/A 7/8/2021    Procedure: Left Heart Cath;  Surgeon: Mega Ellison MD;  Location: [facility] CATH INVASIVE LOCATION;  Service: Cardiovascular;  Laterality: N/A;    CARDIAC DEFIBRILLATOR PLACEMENT         Family History:   Family History   Problem Relation Age of Onset    Coronary artery disease Mother     Deep vein thrombosis Father     Heart disease Father     Other Sister         heart issues    No Known Problems Maternal Grandmother     No Known Problems Maternal Grandfather     Stroke Paternal Grandmother     No Known Problems Paternal Grandfather        Social History:   Social History     Socioeconomic History    Marital status:    Tobacco Use     "Smoking status: Every Day     Current packs/day: 1.00     Types: Cigarettes    Smokeless tobacco: Never   Vaping Use    Vaping status: Never Used    Passive vaping exposure: Yes   Substance and Sexual Activity    Alcohol use: Not Currently    Drug use: Defer    Sexual activity: Defer       Medications:     Current Outpatient Medications:     amiodarone (PACERONE) 200 MG tablet, Take 2 tablets by mouth 3 (Three) Times a Day. TID for 10 days then 200mg Daily., Disp: 30 tablet, Rfl: 5    aspirin 81 MG EC tablet, Take 1 tablet by mouth Daily., Disp: 30 tablet, Rfl: 6    carvedilol (COREG) 12.5 MG tablet, TAKE 1 TABLET BY MOUTH TWICE DAILY . APPOINTMENT REQUIRED FOR FUTURE REFILLS, Disp: 180 tablet, Rfl: 0    losartan (COZAAR) 25 MG tablet, Take 1 tablet by mouth Daily., Disp: , Rfl:     Allergies:   Allergies   Allergen Reactions    Atorvastatin Myalgia    Jardiance [Empagliflozin] Dizziness       Objective   Vital Signs:   Vitals:    03/03/25 1013   BP: 142/82   BP Location: Left arm   Patient Position: Sitting   Cuff Size: Adult   Pulse: 61   SpO2: 97%   Weight: 84.9 kg (187 lb 3.2 oz)   Height: 175.3 cm (69\")       PHYSICAL EXAM  General appearance: Awake, alert, cooperative  Head: Normocephalic, without obvious abnormality, atraumatic  Eyes: Conjunctivae/corneas clear, EOMs intact  Neck: no adenopathy, no carotid bruit, no JVD, and thyroid: not enlarged  Lungs: clear to auscultation bilaterally and no rhonchi or crackles\", ' symmetric  Heart: regular rate and rhythm, S1, S2 normal, no murmur, click, rub or gallop  Abdomen: Soft, non-tender, bowel sounds normal,  no organomegaly  Extremities: extremities normal, atraumatic, no cyanosis or edema  Skin: Skin color, turgor normal, no rashes or lesions  Neurologic: Grossly normal     Lab Results   Component Value Date    GLUCOSE 77 11/19/2021    CALCIUM 9.4 11/19/2021     11/19/2021    K 5.2 11/19/2021    CO2 27.6 11/19/2021     11/19/2021    BUN 11 " "11/19/2021    CREATININE 1.14 11/19/2021    EGFRIFAFRI 85 11/19/2021    EGFRIFNONA 70 11/19/2021    BCR 9.6 11/19/2021    ANIONGAP 13.0 07/08/2021     Lab Results   Component Value Date    WBC 15.37 (H) 11/19/2021    HGB 16.5 11/19/2021    HCT 47.9 11/19/2021    MCV 87.7 11/19/2021     (H) 11/19/2021     No results found for: \"INR\", \"PROTIME\"  Lab Results   Component Value Date    TSH 2.870 07/14/2021       Cardiac Testing:     I personally viewed and interpreted the patient's EKG/Telemetry/lab data    Procedures    Tobacco Cessation: N/A  Obstructive Sleep Apnea Screening: Completed    Advance Care Planning   ACP discussion was declined by the patient. Patient does not have an advance directive, declines further assistance.       Assessment & Plan    Diagnoses and all orders for this visit:    1. HFrEF (EF <20%) (Primary)    2. ICD (implantable cardioverter-defibrillator), dual, in situ    3. Ventricular tachycardia    4. Tobacco use         Diagnosis Plan   1. HFrEF (EF <20%)  Severe LV systolic dysfunction.  Has been some years since she has had an assessment of his ejection fraction.  It is time to check this once again.    Prior ischemic cardiomyopathy.    Prior known heart percent occlusion of the LAD.  No recent cardiac catheterization.  No anginal symptoms however.      2. ICD (implantable cardioverter-defibrillator), dual, in situ  [employer] single lead transvenous ICD.    Appropriate antitachycardia pacing delivered for appropriately sensitive monomorphic VT's cycle length of 320 ms.  ATP was unsuccessful ultimately presented in sustained VT requiring cardioversion at outside hospital.      3. Ventricular tachycardia  Multiple recurrent ICD therapies.    Lengthy conversation with him on prior visit about the option of VT ablation.  This would be a high risk procedure.  I quoted him a 5 to 6% chance of significant procedural complication including death cardiac perforation tamponade stroke " myocardial infarction worsening heart failure VT storm need for urgent hemodynamic support and other potential complications.  He understands risk.  At this time he wishes to proceed.    The goal of the VT ablation of course is to reduce the number of VT events and potentially reduce the number of therapies required for the VT events.    The patient and I made a mutually shared decision ( shared decision making model ) that the patient would be best served by proceeding with the above invasive heart procedure.  This is a high risk invasive cardiac procedure which comes with significant risk of morbidity and mortality.  This patient has significant underlying  heart disease.  Sonido Griffinwn Mercedez understands these risks and   has made an informed decision to proceed.    General anesthesia  Preoperative echo as ordered.  Rhythmia  Stereotaxis  No medications to hold      4. Tobacco use  He has cut down substantially.  He plans to be completely tobacco free next week.        Body mass index is 27.64 kg/m².    I spent 48 minutes in consultation with this patient which included more than 65% of this time in direct face-to-face counseling, physical examination and discussion of my assessment and findings and this shared decision making with the patient.  The remainder of the time not spent face-to-face was performing one, some or all of the following actions: preparing to see the patient (e.g. reviewing tests, prior clinicians' notes, etc), ordering medications, tests or procedures, coordination of care, discussion of the plan with other healthcare providers, documenting clinical information in epic as well as independently interpreting results and communication of these results to the patient family and/or caregiver(s).  Please note that this explicitly excludes time spent on other separate billable services such as performing procedures or test interpretation, when applicable.      Follow Up:       Thank you for  allowing me to participate in the care of your patient. Please to not hesitate to contact me with additional questions or concerns.      Jn Shah DO, FACC, RS  Cardiac Electrophysiologist  [facility] / [facility]

## 2025-03-11 ENCOUNTER — APPOINTMENT (OUTPATIENT)
Dept: GENERAL RADIOLOGY | Facility: HOSPITAL | Age: 50
End: 2025-03-11
Payer: COMMERCIAL

## 2025-03-11 ENCOUNTER — HOSPITAL ENCOUNTER (OUTPATIENT)
Facility: HOSPITAL | Age: 50
Discharge: HOME | End: 2025-03-11
Admitting: INTERNAL MEDICINE
Payer: COMMERCIAL

## 2025-03-11 VITALS
DIASTOLIC BLOOD PRESSURE: 77 MMHG | SYSTOLIC BLOOD PRESSURE: 138 MMHG | WEIGHT: 187 LBS | HEIGHT: 69 IN | BODY MASS INDEX: 27.7 KG/M2

## 2025-03-11 DIAGNOSIS — I47.29 VENTRICULAR TACHYCARDIA (PAROXYSMAL): Primary | ICD-10-CM

## 2025-03-11 LAB
ALBUMIN SERPL-MCNC: 4.6 G/DL (ref 3.5–5.2)
ALBUMIN/GLOB SERPL: 1.9 G/DL
ALP SERPL-CCNC: 72 U/L (ref 39–117)
ALT SERPL W P-5'-P-CCNC: 15 U/L (ref 1–41)
ANION GAP SERPL CALCULATED.3IONS-SCNC: 12 MMOL/L (ref 5–15)
AORTIC DIMENSIONLESS INDEX: 0.72 (DI)
ASCENDING AORTA: 3.2 CM
AST SERPL-CCNC: 17 U/L (ref 1–40)
AV MEAN PRESS GRAD SYS DOP V1V2: 3 MMHG
AV VMAX SYS DOP: 117.5 CM/SEC
BASOPHILS # BLD AUTO: 0.24 10*3/MM3 (ref 0–0.2)
BASOPHILS NFR BLD AUTO: 1.8 % (ref 0–1.5)
BH CV ECHO MEAS - AO MAX PG: 5.5 MMHG
BH CV ECHO MEAS - AO ROOT DIAM: 3.5 CM
BH CV ECHO MEAS - AO V2 VTI: 26.8 CM
BH CV ECHO MEAS - AVA(I,D): 2.25 CM2
BH CV ECHO MEAS - EDV(CUBED): 227 ML
BH CV ECHO MEAS - EDV(MOD-SP2): 261 ML
BH CV ECHO MEAS - EDV(MOD-SP4): 267 ML
BH CV ECHO MEAS - EF(MOD-SP2): 32.2 %
BH CV ECHO MEAS - EF(MOD-SP4): 25.8 %
BH CV ECHO MEAS - ESV(CUBED): 166.4 ML
BH CV ECHO MEAS - ESV(MOD-SP2): 177 ML
BH CV ECHO MEAS - ESV(MOD-SP4): 198 ML
BH CV ECHO MEAS - FS: 9.8 %
BH CV ECHO MEAS - IVS/LVPW: 1 CM
BH CV ECHO MEAS - IVSD: 1.1 CM
BH CV ECHO MEAS - LA DIMENSION: 5 CM
BH CV ECHO MEAS - LAT PEAK E' VEL: 8.2 CM/SEC
BH CV ECHO MEAS - LV DIASTOLIC VOL/BSA (35-75): 133 CM2
BH CV ECHO MEAS - LV MASS(C)D: 287.5 GRAMS
BH CV ECHO MEAS - LV MAX PG: 3.3 MMHG
BH CV ECHO MEAS - LV MEAN PG: 2 MMHG
BH CV ECHO MEAS - LV SYSTOLIC VOL/BSA (12-30): 98.6 CM2
BH CV ECHO MEAS - LV V1 MAX: 90.5 CM/SEC
BH CV ECHO MEAS - LV V1 VTI: 19.2 CM
BH CV ECHO MEAS - LVIDD: 6.1 CM
BH CV ECHO MEAS - LVIDS: 5.5 CM
BH CV ECHO MEAS - LVOT AREA: 3.1 CM2
BH CV ECHO MEAS - LVOT DIAM: 2 CM
BH CV ECHO MEAS - LVPWD: 1.1 CM
BH CV ECHO MEAS - MED PEAK E' VEL: 8.3 CM/SEC
BH CV ECHO MEAS - MV A MAX VEL: 67.1 CM/SEC
BH CV ECHO MEAS - MV DEC SLOPE: 878 CM/SEC2
BH CV ECHO MEAS - MV DEC TIME: 0.17 SEC
BH CV ECHO MEAS - MV E MAX VEL: 108 CM/SEC
BH CV ECHO MEAS - MV E/A: 1.61
BH CV ECHO MEAS - MV MAX PG: 6.6 MMHG
BH CV ECHO MEAS - MV MEAN PG: 2 MMHG
BH CV ECHO MEAS - MV P1/2T: 45 MSEC
BH CV ECHO MEAS - MV V2 VTI: 32.2 CM
BH CV ECHO MEAS - MVA(P1/2T): 4.9 CM2
BH CV ECHO MEAS - MVA(VTI): 1.88 CM2
BH CV ECHO MEAS - PA ACC TIME: 0.18 SEC
BH CV ECHO MEAS - PA V2 MAX: 92.4 CM/SEC
BH CV ECHO MEAS - PI END-D VEL: 138 CM/SEC
BH CV ECHO MEAS - RAP SYSTOLE: 3 MMHG
BH CV ECHO MEAS - RVSP: 20 MMHG
BH CV ECHO MEAS - SV(LVOT): 60.3 ML
BH CV ECHO MEAS - SV(MOD-SP2): 84 ML
BH CV ECHO MEAS - SV(MOD-SP4): 69 ML
BH CV ECHO MEAS - SVI(LVOT): 30 ML/M2
BH CV ECHO MEAS - SVI(MOD-SP2): 41.8 ML/M2
BH CV ECHO MEAS - SVI(MOD-SP4): 34.4 ML/M2
BH CV ECHO MEAS - TAPSE (>1.6): 2.29 CM
BH CV ECHO MEAS - TR MAX PG: 13.4 MMHG
BH CV ECHO MEAS - TR MAX VEL: 182.8 CM/SEC
BH CV ECHO MEASUREMENTS AVERAGE E/E' RATIO: 13.09
BH CV VAS BP LEFT ARM: NORMAL MMHG
BH CV XLRA - RV BASE: 3.8 CM
BH CV XLRA - RV LENGTH: 8.1 CM
BH CV XLRA - RV MID: 2.8 CM
BH CV XLRA - TDI S': 11.9 CM/SEC
BILIRUB SERPL-MCNC: 0.6 MG/DL (ref 0–1.2)
BUN SERPL-MCNC: 9 MG/DL (ref 6–20)
BUN/CREAT SERPL: 8.2 (ref 7–25)
CALCIUM SPEC-SCNC: 9.4 MG/DL (ref 8.6–10.5)
CHLORIDE SERPL-SCNC: 101 MMOL/L (ref 98–107)
CO2 SERPL-SCNC: 25 MMOL/L (ref 22–29)
CREAT SERPL-MCNC: 1.1 MG/DL (ref 0.76–1.27)
DACRYOCYTES BLD QL SMEAR: NORMAL
DEPRECATED RDW RBC AUTO: 60.4 FL (ref 37–54)
EGFRCR SERPLBLD CKD-EPI 2021: 83.3 ML/MIN/1.73
ELLIPTOCYTES BLD QL SMEAR: NORMAL
EOSINOPHIL # BLD AUTO: 0.25 10*3/MM3 (ref 0–0.4)
EOSINOPHIL NFR BLD AUTO: 1.9 % (ref 0.3–6.2)
ERYTHROCYTE [DISTWIDTH] IN BLOOD BY AUTOMATED COUNT: 18.4 % (ref 12.3–15.4)
GLOBULIN UR ELPH-MCNC: 2.4 GM/DL
GLUCOSE SERPL-MCNC: 96 MG/DL (ref 65–99)
HCT VFR BLD AUTO: 47.5 % (ref 37.5–51)
HELMET CELLS: NORMAL
HGB BLD-MCNC: 15.2 G/DL (ref 13–17.7)
HOLD SPECIMEN: NORMAL
IMM GRANULOCYTES # BLD AUTO: 0.09 10*3/MM3 (ref 0–0.05)
IMM GRANULOCYTES NFR BLD AUTO: 0.7 % (ref 0–0.5)
LEFT ATRIUM VOLUME INDEX: 36.4 ML/M2
LV EF BIPLANE MOD: 29 %
LYMPHOCYTES # BLD AUTO: 1.61 10*3/MM3 (ref 0.7–3.1)
LYMPHOCYTES NFR BLD AUTO: 12.2 % (ref 19.6–45.3)
MAGNESIUM SERPL-MCNC: 2.2 MG/DL (ref 1.6–2.6)
MCH RBC QN AUTO: 29 PG (ref 26.6–33)
MCHC RBC AUTO-ENTMCNC: 32 G/DL (ref 31.5–35.7)
MCV RBC AUTO: 90.6 FL (ref 79–97)
MONOCYTES # BLD AUTO: 0.68 10*3/MM3 (ref 0.1–0.9)
MONOCYTES NFR BLD AUTO: 5.2 % (ref 5–12)
NEUTROPHILS NFR BLD AUTO: 10.33 10*3/MM3 (ref 1.7–7)
NEUTROPHILS NFR BLD AUTO: 78.2 % (ref 42.7–76)
NRBC BLD AUTO-RTO: 0.2 /100 WBC (ref 0–0.2)
NT-PROBNP SERPL-MCNC: 376.7 PG/ML (ref 0–450)
PLAT MORPH BLD: NORMAL
PLATELET # BLD AUTO: 498 10*3/MM3 (ref 140–450)
PMV BLD AUTO: 10.7 FL (ref 6–12)
POIKILOCYTOSIS BLD QL SMEAR: NORMAL
POTASSIUM SERPL-SCNC: 4.1 MMOL/L (ref 3.5–5.2)
PROT SERPL-MCNC: 7 G/DL (ref 6–8.5)
RBC # BLD AUTO: 5.24 10*6/MM3 (ref 4.14–5.8)
SODIUM SERPL-SCNC: 138 MMOL/L (ref 136–145)
T4 FREE SERPL-MCNC: 1.38 NG/DL (ref 0.92–1.68)
TSH SERPL DL<=0.05 MIU/L-ACNC: 14.14 UIU/ML (ref 0.27–4.2)
WBC MORPH BLD: NORMAL
WBC NRBC COR # BLD AUTO: 13.2 10*3/MM3 (ref 3.4–10.8)
WHOLE BLOOD HOLD COAG: NORMAL
WHOLE BLOOD HOLD SPECIMEN: NORMAL

## 2025-03-11 PROCEDURE — 93306 TTE W/DOPPLER COMPLETE: CPT

## 2025-03-11 PROCEDURE — 85007 BL SMEAR W/DIFF WBC COUNT: CPT | Performed by: EMERGENCY MEDICINE

## 2025-03-11 PROCEDURE — 80050 GENERAL HEALTH PANEL: CPT | Performed by: EMERGENCY MEDICINE

## 2025-03-11 PROCEDURE — 99285 EMERGENCY DEPT VISIT HI MDM: CPT

## 2025-03-11 PROCEDURE — 84439 ASSAY OF FREE THYROXINE: CPT | Performed by: EMERGENCY MEDICINE

## 2025-03-11 PROCEDURE — 83880 ASSAY OF NATRIURETIC PEPTIDE: CPT | Performed by: EMERGENCY MEDICINE

## 2025-03-11 PROCEDURE — 71045 X-RAY EXAM CHEST 1 VIEW: CPT

## 2025-03-11 PROCEDURE — 93005 ELECTROCARDIOGRAM TRACING: CPT

## 2025-03-11 PROCEDURE — 93005 ELECTROCARDIOGRAM TRACING: CPT | Performed by: EMERGENCY MEDICINE

## 2025-03-11 PROCEDURE — 83735 ASSAY OF MAGNESIUM: CPT | Performed by: EMERGENCY MEDICINE

## 2025-03-11 PROCEDURE — 36415 COLL VENOUS BLD VENIPUNCTURE: CPT

## 2025-03-11 PROCEDURE — 93306 TTE W/DOPPLER COMPLETE: CPT | Performed by: INTERNAL MEDICINE

## 2025-03-11 RX ORDER — SODIUM CHLORIDE 9 MG/ML
40 INJECTION, SOLUTION INTRAVENOUS AS NEEDED
OUTPATIENT
Start: 2025-03-11

## 2025-03-11 RX ORDER — NITROGLYCERIN 0.4 MG/1
0.4 TABLET SUBLINGUAL
OUTPATIENT
Start: 2025-03-11

## 2025-03-11 RX ORDER — SODIUM CHLORIDE 0.9 % (FLUSH) 0.9 %
3 SYRINGE (ML) INJECTION EVERY 12 HOURS SCHEDULED
OUTPATIENT
Start: 2025-03-11

## 2025-03-11 RX ORDER — ACETAMINOPHEN 325 MG/1
650 TABLET ORAL EVERY 4 HOURS PRN
OUTPATIENT
Start: 2025-03-11

## 2025-03-11 RX ORDER — SODIUM CHLORIDE 0.9 % (FLUSH) 0.9 %
10 SYRINGE (ML) INJECTION AS NEEDED
Status: DISCONTINUED | OUTPATIENT
Start: 2025-03-11 | End: 2025-03-12 | Stop reason: HOSPADM

## 2025-03-11 RX ORDER — SODIUM CHLORIDE 0.9 % (FLUSH) 0.9 %
10 SYRINGE (ML) INJECTION AS NEEDED
OUTPATIENT
Start: 2025-03-11

## 2025-03-11 NOTE — ED PROVIDER NOTES
Subjective   History of Present Illness  Patient is a pleasant 49-year-old male with a long history of CHF, EF less than 20%.  Presents to the emergency department with rapid palpitations and mild chest discomfort consistent with symptoms experienced with V. tach in the past.  States that around 3 PM today he was performing just mild work on his vehicle, no strenuous exertion, when he felt the rapid palpitations start.  Had mild chest discomfort associated with this.  The symptoms lasted about half an hour.  States that at baseline he feels generally fatigued and lightheaded and states that during the episode the lightheadedness might have been slightly more pronounced than usual.  He is currently followed by Dr. Moore, electrophysiology/cardiology.  He has an ICD in place.  Approximately 1 month ago he was admitted to [facility] and diagnosed with episode of V. tach and this is what he is afraid of today.  He has an ablation scheduled with Dr. Ashby in 10 days.  As part of the workup for this procedure he had an echocardiogram performed this morning here at [facility].  Denies fever, chills, abdominal pain, vomiting, diarrhea, or other acute complaints.        Review of Systems   All other systems reviewed and are negative.      Past Medical History:   Diagnosis Date    CAD 7/8/2021    HFrEF (EF <20%) 7/8/2021    TTE 3/12/2014:   EF estimated to be less than 20%  Moderate mitral regurgitation.     Moderate MR 7/8/2021    Myocardial infarction     Tobacco use 7/8/2021       Allergies   Allergen Reactions    Atorvastatin Myalgia    Jardiance [Empagliflozin] Dizziness       Past Surgical History:   Procedure Laterality Date    CARDIAC CATHETERIZATION N/A 7/8/2021    Procedure: Left Heart Cath;  Surgeon: Mega Ellison MD;  Location: [facility] CATH INVASIVE LOCATION;  Service: Cardiovascular;  Laterality: N/A;    CARDIAC DEFIBRILLATOR PLACEMENT         Family History   Problem Relation Age of Onset     Coronary artery disease Mother     Deep vein thrombosis Father     Heart disease Father     Other Sister         heart issues    No Known Problems Maternal Grandmother     No Known Problems Maternal Grandfather     Stroke Paternal Grandmother     No Known Problems Paternal Grandfather        Social History     Socioeconomic History    Marital status:    Tobacco Use    Smoking status: Every Day     Current packs/day: 1.00     Types: Cigarettes    Smokeless tobacco: Never   Vaping Use    Vaping status: Never Used    Passive vaping exposure: Yes   Substance and Sexual Activity    Alcohol use: Not Currently    Drug use: Defer    Sexual activity: Defer           Objective   Physical Exam  Vitals and nursing note reviewed.   Constitutional:       General: He is not in acute distress.  HENT:      Head: Normocephalic and atraumatic.   Eyes:      Conjunctiva/sclera: Conjunctivae normal.      Pupils: Pupils are equal, round, and reactive to light.   Neck:      Thyroid: No thyromegaly.   Cardiovascular:      Rate and Rhythm: Normal rate and regular rhythm.      Heart sounds: Normal heart sounds. No murmur heard.     No friction rub. No gallop.   Pulmonary:      Effort: Pulmonary effort is normal. No respiratory distress.      Breath sounds: Normal breath sounds.   Chest:      Chest wall: No tenderness.   Abdominal:      Palpations: Abdomen is soft.      Tenderness: There is no abdominal tenderness.   Musculoskeletal:         General: Normal range of motion.      Cervical back: Normal range of motion and neck supple.   Lymphadenopathy:      Cervical: No cervical adenopathy.   Skin:     General: Skin is warm and dry.      Capillary Refill: Capillary refill takes less than 2 seconds.   Neurological:      General: No focal deficit present.      Mental Status: He is alert and oriented to person, place, and time.   Psychiatric:         Behavior: Behavior normal.         Thought Content: Thought content normal.          Procedures           ED Course  ED Course as of 03/12/25 1640   e Mar 11, 2025   1913 The patient was evaluated during a capacity surge environment. This includes evaluation in non-traditional EM settings as well as potential care, result, disposition delays. I have made every effort to evaluate and care for this patient as efficiently and thoroughly as possible.   [CP]      ED Course User Index  [CP] Faisal Lai DO      Recent Results (from the past 24 hours)   ECG 12 Lead ED Triage Standing Order; Dysrhythmia    Collection Time: 03/11/25  4:54 PM   Result Value Ref Range    QT Interval 460 ms    QTC Interval 478 ms   Comprehensive Metabolic Panel    Collection Time: 03/11/25  8:11 PM    Specimen: Blood   Result Value Ref Range    Glucose 96 65 - 99 mg/dL    BUN 9 6 - 20 mg/dL    Creatinine 1.10 0.76 - 1.27 mg/dL    Sodium 138 136 - 145 mmol/L    Potassium 4.1 3.5 - 5.2 mmol/L    Chloride 101 98 - 107 mmol/L    CO2 25.0 22.0 - 29.0 mmol/L    Calcium 9.4 8.6 - 10.5 mg/dL    Total Protein 7.0 6.0 - 8.5 g/dL    Albumin 4.6 3.5 - 5.2 g/dL    ALT (SGPT) 15 1 - 41 U/L    AST (SGOT) 17 1 - 40 U/L    Alkaline Phosphatase 72 39 - 117 U/L    Total Bilirubin 0.6 0.0 - 1.2 mg/dL    Globulin 2.4 gm/dL    A/G Ratio 1.9 g/dL    BUN/Creatinine Ratio 8.2 7.0 - 25.0    Anion Gap 12.0 5.0 - 15.0 mmol/L    eGFR 83.3 >60.0 mL/min/1.73   Magnesium    Collection Time: 03/11/25  8:11 PM    Specimen: Blood   Result Value Ref Range    Magnesium 2.2 1.6 - 2.6 mg/dL   TSH Rfx On Abnormal To Free T4    Collection Time: 03/11/25  8:11 PM    Specimen: Blood   Result Value Ref Range    TSH 14.140 (H) 0.270 - 4.200 uIU/mL   BNP    Collection Time: 03/11/25  8:11 PM    Specimen: Blood   Result Value Ref Range    proBNP 376.7 0.0 - 450.0 pg/mL   Green Top (Gel)    Collection Time: 03/11/25  8:11 PM   Result Value Ref Range    Extra Tube Hold for add-ons.    Lavender Top    Collection Time: 03/11/25  8:11 PM   Result Value Ref Range     Extra Tube hold for add-on    Gold Top - SST    Collection Time: 03/11/25  8:11 PM   Result Value Ref Range    Extra Tube Hold for add-ons.    Gray Top    Collection Time: 03/11/25  8:11 PM   Result Value Ref Range    Extra Tube Hold for add-ons.    Light Blue Top    Collection Time: 03/11/25  8:11 PM   Result Value Ref Range    Extra Tube Hold for add-ons.    CBC Auto Differential    Collection Time: 03/11/25  8:11 PM    Specimen: Blood   Result Value Ref Range    WBC 13.20 (H) 3.40 - 10.80 10*3/mm3    RBC 5.24 4.14 - 5.80 10*6/mm3    Hemoglobin 15.2 13.0 - 17.7 g/dL    Hematocrit 47.5 37.5 - 51.0 %    MCV 90.6 79.0 - 97.0 fL    MCH 29.0 26.6 - 33.0 pg    MCHC 32.0 31.5 - 35.7 g/dL    RDW 18.4 (H) 12.3 - 15.4 %    RDW-SD 60.4 (H) 37.0 - 54.0 fl    MPV 10.7 6.0 - 12.0 fL    Platelets 498 (H) 140 - 450 10*3/mm3    Neutrophil % 78.2 (H) 42.7 - 76.0 %    Lymphocyte % 12.2 (L) 19.6 - 45.3 %    Monocyte % 5.2 5.0 - 12.0 %    Eosinophil % 1.9 0.3 - 6.2 %    Basophil % 1.8 (H) 0.0 - 1.5 %    Immature Grans % 0.7 (H) 0.0 - 0.5 %    Neutrophils, Absolute 10.33 (H) 1.70 - 7.00 10*3/mm3    Lymphocytes, Absolute 1.61 0.70 - 3.10 10*3/mm3    Monocytes, Absolute 0.68 0.10 - 0.90 10*3/mm3    Eosinophils, Absolute 0.25 0.00 - 0.40 10*3/mm3    Basophils, Absolute 0.24 (H) 0.00 - 0.20 10*3/mm3    Immature Grans, Absolute 0.09 (H) 0.00 - 0.05 10*3/mm3    nRBC 0.2 0.0 - 0.2 /100 WBC   Scan Slide    Collection Time: 03/11/25  8:11 PM    Specimen: Blood   Result Value Ref Range    Dacrocytes Slight/1+ None Seen    Elliptocytes Mod/2+ None Seen    Helmet Cells Slight/1+ None Seen    Poikilocytes Mod/2+ None Seen    WBC Morphology Normal Normal    Platelet Morphology Normal Normal   T4, Free    Collection Time: 03/11/25  8:11 PM    Specimen: Blood   Result Value Ref Range    Free T4 1.38 0.92 - 1.68 ng/dL   CBC Auto Differential    Collection Time: 03/12/25  6:06 AM    Specimen: Blood   Result Value Ref Range    WBC 9.79 3.40 - 10.80  10*3/mm3    RBC 4.97 4.14 - 5.80 10*6/mm3    Hemoglobin 14.6 13.0 - 17.7 g/dL    Hematocrit 45.5 37.5 - 51.0 %    MCV 91.5 79.0 - 97.0 fL    MCH 29.4 26.6 - 33.0 pg    MCHC 32.1 31.5 - 35.7 g/dL    RDW 18.5 (H) 12.3 - 15.4 %    RDW-SD 61.9 (H) 37.0 - 54.0 fl    MPV 10.8 6.0 - 12.0 fL    Platelets 426 140 - 450 10*3/mm3    Neutrophil % 72.1 42.7 - 76.0 %    Lymphocyte % 17.2 (L) 19.6 - 45.3 %    Monocyte % 6.1 5.0 - 12.0 %    Eosinophil % 2.1 0.3 - 6.2 %    Basophil % 2.0 (H) 0.0 - 1.5 %    Immature Grans % 0.5 0.0 - 0.5 %    Neutrophils, Absolute 7.05 (H) 1.70 - 7.00 10*3/mm3    Lymphocytes, Absolute 1.68 0.70 - 3.10 10*3/mm3    Monocytes, Absolute 0.60 0.10 - 0.90 10*3/mm3    Eosinophils, Absolute 0.21 0.00 - 0.40 10*3/mm3    Basophils, Absolute 0.20 0.00 - 0.20 10*3/mm3    Immature Grans, Absolute 0.05 0.00 - 0.05 10*3/mm3    nRBC 0.2 0.0 - 0.2 /100 WBC   Comprehensive Metabolic Panel    Collection Time: 03/12/25  6:06 AM    Specimen: Blood   Result Value Ref Range    Glucose 86 65 - 99 mg/dL    BUN 10 6 - 20 mg/dL    Creatinine 0.95 0.76 - 1.27 mg/dL    Sodium 141 136 - 145 mmol/L    Potassium 4.0 3.5 - 5.2 mmol/L    Chloride 104 98 - 107 mmol/L    CO2 27.0 22.0 - 29.0 mmol/L    Calcium 9.1 8.6 - 10.5 mg/dL    Total Protein 6.4 6.0 - 8.5 g/dL    Albumin 4.3 3.5 - 5.2 g/dL    ALT (SGPT) 13 1 - 41 U/L    AST (SGOT) 17 1 - 40 U/L    Alkaline Phosphatase 67 39 - 117 U/L    Total Bilirubin 0.5 0.0 - 1.2 mg/dL    Globulin 2.1 gm/dL    A/G Ratio 2.0 g/dL    BUN/Creatinine Ratio 10.5 7.0 - 25.0    Anion Gap 10.0 5.0 - 15.0 mmol/L    eGFR 99.3 >60.0 mL/min/1.73   Scan Slide    Collection Time: 03/12/25  6:06 AM    Specimen: Blood   Result Value Ref Range    Ovalocytes Slight/1+ None Seen    WBC Morphology Normal Normal    Platelet Morphology Normal Normal     Note: In addition to lab results from this visit, the labs listed above may include labs taken at another facility or during a different encounter within the last 24  hours. Please correlate lab times with ED admission and discharge times for further clarification of the services performed during this visit.    XR Chest 1 View   Final Result   Impression:   No acute cardiopulmonary abnormality.         Electronically Signed: Tanya Carranza MD     3/11/2025 5:19 PM EDT     Workstation ID: [id number]        Vitals:    03/12/25 0824 03/12/25 0826 03/12/25 0840 03/12/25 0900   BP: 138/84 138/84 130/82 128/86   Pulse: 56 55 51 54   Resp:       Temp:       TempSrc:       SpO2:  97% 95% 97%   Weight:       Height:         Medications - No data to display    ECG/EMG Results (last 24 hours)       Procedure Component Value Units Date/Time    ECG 12 Lead ED Triage Standing Order; Dysrhythmia [[id number]] Collected: 03/11/25 1654     Updated: 03/11/25 1655     QT Interval 460 ms      QTC Interval 478 ms     Narrative:      Test Reason : ED Triage Standing Order~  Blood Pressure :   */*   mmHG  Vent. Rate :  65 BPM     Atrial Rate :  65 BPM     P-R Int : 174 ms          QRS Dur : 118 ms      QT Int : 460 ms       P-R-T Axes :  30  -6  82 degrees    QTcB Int : 478 ms    Normal sinus rhythm  Possible Inferior infarct , age undetermined  Anteroseptal infarct (cited on or before 13-Mar-2014)  Abnormal ECG  When compared with ECG of 08-Jul-2021 15:03,  premature ventricular complexes are no longer present  T wave inversion no longer evident in Anterolateral leads    Referred By: ED           Confirmed By:           ECG 12 Lead ED Triage Standing Order; Dysrhythmia   Preliminary Result   Test Reason : ED Triage Standing Order~   Blood Pressure :   */*   mmHG   Vent. Rate :  65 BPM     Atrial Rate :  65 BPM      P-R Int : 174 ms          QRS Dur : 118 ms       QT Int : 460 ms       P-R-T Axes :  30  -6  82 degrees     QTcB Int : 478 ms      Normal sinus rhythm   Possible Inferior infarct , age undetermined   Anteroseptal infarct (cited on or before 13-Mar-2014)   Abnormal ECG   When compared with ECG  of 08-Jul-2021 15:03,   premature ventricular complexes are no longer present   T wave inversion no longer evident in Anterolateral leads      Referred By: ED           Confirmed By:                                                            Medical Decision Making  Pt presented following a confirmed episode of V Tach.  ICD interrogated and confirmed.  Pt's episode of self terminating.  Pt has been compliant with medications.  Case discussed with cardiology and internal medicine.  Pt is not comfortable with DC at this time.  Cardiology does not recommend additional medication tonight and will consult in the AM.  Mediation adjustments to be made at that time if needed.  Pt admitted to the internal medicine service for further evaluation and management.    Problems Addressed:  Chest discomfort: complicated acute illness or injury  Palpitations: complicated acute illness or injury  Ventricular tachycardia: complicated acute illness or injury    Amount and/or Complexity of Data Reviewed  External Data Reviewed: notes.  Labs: ordered. Decision-making details documented in ED Course.  Radiology: ordered and independent interpretation performed. Decision-making details documented in ED Course.  ECG/medicine tests: ordered and independent interpretation performed. Decision-making details documented in ED Course.    Risk  Prescription drug management.  Decision regarding hospitalization.        Final diagnoses:   Ventricular tachycardia   Chest discomfort   Palpitations       ED Disposition  ED Disposition       ED Disposition   Decision to Admit    Condition   --    Comment   Level of Care: Telemetry [5]   Diagnosis: Non-sustained ventricular tachycardia [306492]   Admitting Physician: RAUL CHRISTIAN [[id number]]   Is patient appropriate for Inpatient Observation Unit?: Yes [1]                  Faisal Lai DO  03/12/25 9967

## 2025-03-12 ENCOUNTER — READMISSION MANAGEMENT (OUTPATIENT)
Dept: CALL CENTER | Facility: HOSPITAL | Age: 50
End: 2025-03-12
Payer: COMMERCIAL

## 2025-03-12 ENCOUNTER — HOSPITAL ENCOUNTER (OUTPATIENT)
Facility: HOSPITAL | Age: 50
Discharge: HOME | End: 2025-03-12
Attending: EMERGENCY MEDICINE | Admitting: INTERNAL MEDICINE
Payer: COMMERCIAL

## 2025-03-12 VITALS
RESPIRATION RATE: 16 BRPM | OXYGEN SATURATION: 97 % | HEART RATE: 54 BPM | TEMPERATURE: 98.1 F | WEIGHT: 186.95 LBS | SYSTOLIC BLOOD PRESSURE: 128 MMHG | DIASTOLIC BLOOD PRESSURE: 86 MMHG | HEIGHT: 69 IN | BODY MASS INDEX: 27.69 KG/M2

## 2025-03-12 DIAGNOSIS — I47.20 VENTRICULAR TACHYCARDIA: Primary | ICD-10-CM

## 2025-03-12 DIAGNOSIS — R07.89 CHEST DISCOMFORT: ICD-10-CM

## 2025-03-12 DIAGNOSIS — R00.2 PALPITATIONS: ICD-10-CM

## 2025-03-12 PROBLEM — I47.29 NON-SUSTAINED VENTRICULAR TACHYCARDIA: Status: ACTIVE | Noted: 2025-03-12

## 2025-03-12 PROBLEM — I47.29 NON-SUSTAINED VENTRICULAR TACHYCARDIA: Status: RESOLVED | Noted: 2025-03-12 | Resolved: 2025-03-12

## 2025-03-12 LAB
ALBUMIN SERPL-MCNC: 4.3 G/DL (ref 3.5–5.2)
ALBUMIN/GLOB SERPL: 2 G/DL
ALP SERPL-CCNC: 67 U/L (ref 39–117)
ALT SERPL W P-5'-P-CCNC: 13 U/L (ref 1–41)
ANION GAP SERPL CALCULATED.3IONS-SCNC: 10 MMOL/L (ref 5–15)
AST SERPL-CCNC: 17 U/L (ref 1–40)
BASOPHILS # BLD AUTO: 0.2 10*3/MM3 (ref 0–0.2)
BASOPHILS NFR BLD AUTO: 2 % (ref 0–1.5)
BILIRUB SERPL-MCNC: 0.5 MG/DL (ref 0–1.2)
BUN SERPL-MCNC: 10 MG/DL (ref 6–20)
BUN/CREAT SERPL: 10.5 (ref 7–25)
CALCIUM SPEC-SCNC: 9.1 MG/DL (ref 8.6–10.5)
CHLORIDE SERPL-SCNC: 104 MMOL/L (ref 98–107)
CO2 SERPL-SCNC: 27 MMOL/L (ref 22–29)
CREAT SERPL-MCNC: 0.95 MG/DL (ref 0.76–1.27)
DEPRECATED RDW RBC AUTO: 61.9 FL (ref 37–54)
EGFRCR SERPLBLD CKD-EPI 2021: 99.3 ML/MIN/1.73
EOSINOPHIL # BLD AUTO: 0.21 10*3/MM3 (ref 0–0.4)
EOSINOPHIL NFR BLD AUTO: 2.1 % (ref 0.3–6.2)
ERYTHROCYTE [DISTWIDTH] IN BLOOD BY AUTOMATED COUNT: 18.5 % (ref 12.3–15.4)
GLOBULIN UR ELPH-MCNC: 2.1 GM/DL
GLUCOSE SERPL-MCNC: 86 MG/DL (ref 65–99)
HCT VFR BLD AUTO: 45.5 % (ref 37.5–51)
HGB BLD-MCNC: 14.6 G/DL (ref 13–17.7)
IMM GRANULOCYTES # BLD AUTO: 0.05 10*3/MM3 (ref 0–0.05)
IMM GRANULOCYTES NFR BLD AUTO: 0.5 % (ref 0–0.5)
LYMPHOCYTES # BLD AUTO: 1.68 10*3/MM3 (ref 0.7–3.1)
LYMPHOCYTES NFR BLD AUTO: 17.2 % (ref 19.6–45.3)
MCH RBC QN AUTO: 29.4 PG (ref 26.6–33)
MCHC RBC AUTO-ENTMCNC: 32.1 G/DL (ref 31.5–35.7)
MCV RBC AUTO: 91.5 FL (ref 79–97)
MONOCYTES # BLD AUTO: 0.6 10*3/MM3 (ref 0.1–0.9)
MONOCYTES NFR BLD AUTO: 6.1 % (ref 5–12)
NEUTROPHILS NFR BLD AUTO: 7.05 10*3/MM3 (ref 1.7–7)
NEUTROPHILS NFR BLD AUTO: 72.1 % (ref 42.7–76)
NRBC BLD AUTO-RTO: 0.2 /100 WBC (ref 0–0.2)
OVALOCYTES BLD QL SMEAR: NORMAL
PLAT MORPH BLD: NORMAL
PLATELET # BLD AUTO: 426 10*3/MM3 (ref 140–450)
PMV BLD AUTO: 10.8 FL (ref 6–12)
POTASSIUM SERPL-SCNC: 4 MMOL/L (ref 3.5–5.2)
PROT SERPL-MCNC: 6.4 G/DL (ref 6–8.5)
QT INTERVAL: 460 MS
QTC INTERVAL: 478 MS
RBC # BLD AUTO: 4.97 10*6/MM3 (ref 4.14–5.8)
SODIUM SERPL-SCNC: 141 MMOL/L (ref 136–145)
WBC MORPH BLD: NORMAL
WBC NRBC COR # BLD AUTO: 9.79 10*3/MM3 (ref 3.4–10.8)

## 2025-03-12 PROCEDURE — 99214 OFFICE O/P EST MOD 30 MIN: CPT | Performed by: NURSE PRACTITIONER

## 2025-03-12 PROCEDURE — 99222 1ST HOSP IP/OBS MODERATE 55: CPT

## 2025-03-12 PROCEDURE — 80053 COMPREHEN METABOLIC PANEL: CPT

## 2025-03-12 PROCEDURE — 85007 BL SMEAR W/DIFF WBC COUNT: CPT

## 2025-03-12 PROCEDURE — 96372 THER/PROPH/DIAG INJ SC/IM: CPT

## 2025-03-12 PROCEDURE — 85025 COMPLETE CBC W/AUTO DIFF WBC: CPT

## 2025-03-12 PROCEDURE — 25010000002 ENOXAPARIN PER 10 MG

## 2025-03-12 PROCEDURE — G0378 HOSPITAL OBSERVATION PER HR: HCPCS

## 2025-03-12 RX ORDER — ACETAMINOPHEN 650 MG/1
650 SUPPOSITORY RECTAL EVERY 4 HOURS PRN
Status: DISCONTINUED | OUTPATIENT
Start: 2025-03-12 | End: 2025-03-12 | Stop reason: HOSPADM

## 2025-03-12 RX ORDER — ASPIRIN 81 MG/1
81 TABLET ORAL DAILY
Status: DISCONTINUED | OUTPATIENT
Start: 2025-03-12 | End: 2025-03-12 | Stop reason: HOSPADM

## 2025-03-12 RX ORDER — CARVEDILOL 12.5 MG/1
12.5 TABLET ORAL EVERY 12 HOURS SCHEDULED
Status: DISCONTINUED | OUTPATIENT
Start: 2025-03-12 | End: 2025-03-12 | Stop reason: HOSPADM

## 2025-03-12 RX ORDER — BISACODYL 10 MG
10 SUPPOSITORY, RECTAL RECTAL DAILY PRN
Status: DISCONTINUED | OUTPATIENT
Start: 2025-03-12 | End: 2025-03-12 | Stop reason: HOSPADM

## 2025-03-12 RX ORDER — ACETAMINOPHEN 160 MG/5ML
650 SOLUTION ORAL EVERY 4 HOURS PRN
Status: DISCONTINUED | OUTPATIENT
Start: 2025-03-12 | End: 2025-03-12 | Stop reason: HOSPADM

## 2025-03-12 RX ORDER — AMOXICILLIN 250 MG
2 CAPSULE ORAL 2 TIMES DAILY PRN
Status: DISCONTINUED | OUTPATIENT
Start: 2025-03-12 | End: 2025-03-12 | Stop reason: HOSPADM

## 2025-03-12 RX ORDER — AMIODARONE HYDROCHLORIDE 200 MG/1
200 TABLET ORAL
Status: DISCONTINUED | OUTPATIENT
Start: 2025-03-12 | End: 2025-03-12 | Stop reason: HOSPADM

## 2025-03-12 RX ORDER — POLYETHYLENE GLYCOL 3350 17 G/17G
17 POWDER, FOR SOLUTION ORAL DAILY PRN
Status: DISCONTINUED | OUTPATIENT
Start: 2025-03-12 | End: 2025-03-12 | Stop reason: HOSPADM

## 2025-03-12 RX ORDER — ACETAMINOPHEN 325 MG/1
650 TABLET ORAL EVERY 4 HOURS PRN
Status: DISCONTINUED | OUTPATIENT
Start: 2025-03-12 | End: 2025-03-12 | Stop reason: HOSPADM

## 2025-03-12 RX ORDER — SODIUM CHLORIDE 0.9 % (FLUSH) 0.9 %
10 SYRINGE (ML) INJECTION AS NEEDED
Status: DISCONTINUED | OUTPATIENT
Start: 2025-03-12 | End: 2025-03-12 | Stop reason: HOSPADM

## 2025-03-12 RX ORDER — SODIUM CHLORIDE 0.9 % (FLUSH) 0.9 %
10 SYRINGE (ML) INJECTION EVERY 12 HOURS SCHEDULED
Status: DISCONTINUED | OUTPATIENT
Start: 2025-03-12 | End: 2025-03-12 | Stop reason: HOSPADM

## 2025-03-12 RX ORDER — BISACODYL 5 MG/1
5 TABLET, DELAYED RELEASE ORAL DAILY PRN
Status: DISCONTINUED | OUTPATIENT
Start: 2025-03-12 | End: 2025-03-12 | Stop reason: HOSPADM

## 2025-03-12 RX ORDER — NICOTINE 21 MG/24HR
1 PATCH, TRANSDERMAL 24 HOURS TRANSDERMAL
Status: DISCONTINUED | OUTPATIENT
Start: 2025-03-12 | End: 2025-03-12 | Stop reason: HOSPADM

## 2025-03-12 RX ORDER — LOSARTAN POTASSIUM 25 MG/1
25 TABLET ORAL DAILY
Status: DISCONTINUED | OUTPATIENT
Start: 2025-03-12 | End: 2025-03-12 | Stop reason: HOSPADM

## 2025-03-12 RX ORDER — ENOXAPARIN SODIUM 100 MG/ML
40 INJECTION SUBCUTANEOUS DAILY
Status: DISCONTINUED | OUTPATIENT
Start: 2025-03-12 | End: 2025-03-12 | Stop reason: HOSPADM

## 2025-03-12 RX ADMIN — LOSARTAN POTASSIUM 25 MG: 50 TABLET, FILM COATED ORAL at 08:24

## 2025-03-12 RX ADMIN — CARVEDILOL 12.5 MG: 6.25 TABLET, FILM COATED ORAL at 08:24

## 2025-03-12 RX ADMIN — Medication 10 ML: at 09:04

## 2025-03-12 RX ADMIN — AMIODARONE HYDROCHLORIDE 200 MG: 200 TABLET ORAL at 08:28

## 2025-03-12 RX ADMIN — ASPIRIN 81 MG: 81 TABLET, COATED ORAL at 08:24

## 2025-03-12 RX ADMIN — ENOXAPARIN SODIUM 40 MG: 100 INJECTION SUBCUTANEOUS at 08:24

## 2025-03-12 NOTE — CONSULTS
Referring Provider: Ketty Doherty DO   Reason for Consultation: NSVT    Patient Care Team:  Abi Granados PA-C as PCP - General (Physician Assistant)  Jorge Butler MD as Consulting Physician (Cardiology)  Jn Shah DO as Consulting Physician (Cardiology)    Chief complaint NSVT    Problem List:   Ventricular tachycardia  [facility] [place]  2/13/2025 VT, required cardioversion  ICM  3/12/2014 [facility] with 100% LAD   7/8/2021 [facility] with  LAD  7/8/2021 TTE: LVEF 20%  8/2/2021 SPECT: Myocardial perfusion imaging indicates a large-sized infarct located in the apex with no significant ischemia noted. Impressions are consistent with an intermediate risk study.   3/11/2025 TTE LVEF 26-30%, moderate MR, The following left ventricular wall segments are hypokinetic: mid anterior, basal anterolateral, mid anterolateral, basal inferolateral, mid inferolateral, mid inferior, basal inferoseptal, mid inferoseptal, mid anteroseptal, basal anterior, basal inferior and basal inferoseptal. The following left ventricular wall segments are akinetic: apical anterior, apical lateral, apical inferior, apical septal and apex.     VHD  3/11/2025 TTE: noted moderate MR      History of present illness: The patient is a 49-year-old gentleman with a history of sustained V. Tach ([facility] [place]), ischemic cardiomyopathy. His last cardiac catheterization he was noted to have an occluded LAD. He last seen in clinic last week. He was seen at OSH with chest pain, diaphoresis. He is scheduled for a VT ablation next week. He is maintained on amiodarone, coreg    On note, he was admitted to [facility] February 2025 with bilateral pneumonia.  While admitted the patient reports he went into sustained VT.  He received ATP therapy, but no discharges.  He was externally cardioverted by medical staff.  Since discharge he has been feeling fairly well.  He has had residual burning right side of his chest with inhalation of cold air.    He since is attempting to quit smoking cigarettes. He is down to 1/4 ppd from 2 ppd.     Unfortunately, the patient presented to the emergency department overnight with complaints of chest discomfort and palpitation. He was working on his vehicle about 3:00 in the afternoon on 3/11 when he began to have palpitations. Palpitations lasted 20 to 30 minutes. He denies chest pain or shortness of breath during this episode, but does report chest tightness and diaphoresis.  He did not syncopize or had presyncope prodromal.      Labs: WBC 13.2, H/H 15.2/47.5, plt 498, creas 1.1, K 4.1, Mag 2.2, TSH 14.140, Free T4 1.38.  CXR: no acute cardiopulmonary abnormalities.    The patient was admitted to medicine services for further evaluation. Cardiology services have been requested.       History  Past Medical History:   Diagnosis Date    CAD 7/8/2021    HFrEF (EF <20%) 7/8/2021    TTE 3/12/2014:   EF estimated to be less than 20%  Moderate mitral regurgitation.     Moderate MR 7/8/2021    Myocardial infarction     Tobacco use 7/8/2021   ,   Past Surgical History:   Procedure Laterality Date    CARDIAC CATHETERIZATION N/A 7/8/2021    Procedure: Left Heart Cath;  Surgeon: Mega Ellison MD;  Location: [facility] CATH INVASIVE LOCATION;  Service: Cardiovascular;  Laterality: N/A;    CARDIAC DEFIBRILLATOR PLACEMENT     ,   Family History   Problem Relation Age of Onset    Coronary artery disease Mother     Deep vein thrombosis Father     Heart disease Father     Other Sister         heart issues    No Known Problems Maternal Grandmother     No Known Problems Maternal Grandfather     Stroke Paternal Grandmother     No Known Problems Paternal Grandfather    , and   Social History     Socioeconomic History    Marital status:    Tobacco Use    Smoking status: Every Day     Current packs/day: 1.00     Types: Cigarettes    Smokeless tobacco: Never   Vaping Use    Vaping status: Never Used    Passive vaping exposure: Yes  "  Substance and Sexual Activity    Alcohol use: Not Currently    Drug use: Defer    Sexual activity: Defer     E-cigarette/Vaping    E-cigarette/Vaping Use Never User     Passive Exposure Yes      E-cigarette/Vaping Substances    Nicotine No     THC No     CBD No     Flavoring No      E-cigarette/Vaping Devices    Disposable No     Pre-filled or Refillable Cartridge No     Refillable Tank No     Pre-filled Pod No          Objective     Vital Sign Min/Max for last 24 hours  Temp  Min: 98.1 °F (36.7 °C)  Max: 98.1 °F (36.7 °C)   BP  Min: 106/75  Max: 143/100   Pulse  Min: 50  Max: 69   Resp  Min: 16  Max: 16   SpO2  Min: 90 %  Max: 98 %   No data recorded   Weight  Min: 84.8 kg (186 lb 15.2 oz)  Max: 84.8 kg (187 lb)     Flowsheet Rows      Flowsheet Row First Filed Value   Admission Height 175.3 cm (69\") Documented at 03/11/2025 1649   Admission Weight 84.8 kg (186 lb 15.2 oz) Documented at 03/11/2025 1649               Ejection Fraction  Lab Results   Component Value Date    EF 23 08/06/2021       Echo EF Estimated  Lab Results   Component Value Date    ECHOEFEST 20 07/08/2021       Physical Exam:     General Appearance:  Alert, cooperative, in no acute distress   Head:  Normocephalic, without obvious abnormality, atraumatic   Eyes:  Lids and lashes normal, conjunctivae and sclerae normal, no icterus, no pallor, corneas clear, PERRLA   Ears:  Ears appear intact with no abnormalities noted   Throat:  No oral lesions, no thrush, oral mucosa moist   Neck:  No adenopathy, supple, trachea midline, no thyromegaly, no carotid bruit, no JVD   Back:  No kyphosis present, no scoliosis present, no skin lesions, erythema or scars, no tenderness to percussion or palpation, range of motion normal   Lungs:  Clear to auscultation, respirations regular, even and unlabored    Heart:  Regular rhythm and normal rate, normal S1 and S2, no murmur, no gallop, no rub, no click   Chest Wall:  No abnormalities observed   Abdomen:  Normal " bowel sounds, no masses, no organomegaly, soft non-tender, non-distended, no guarding, no rebound tenderness   Rectal:  Deferred   Extremities:  Moves all extremities well, no edema, no cyanosis, no redness   Pulses:  Pulses palpable and equal bilaterally   Skin:  No bleeding, bruising or rash   Lymph nodes:  No palpable adenopathy   Neurologic:  Cranial nerves 2 - 12 grossly intact, sensation intact, DTR present and equal bilaterally                                                                               Results Review:   I reviewed the patient's new clinical results.      Assessment & Plan   VT  - Device interrogation in ED revealed 10 second episode of NSVT. This was self terminating. He did not receive therapy, battery life of 2.2 years  - The patient is scheduled for VT ablation on 3/21/2025.  Keep appointment   - continue Amiodarone 200mg PO BID, Coreg 12.5mg PO BID     ICM  - Repeat TTE revealed LVEF 26-30%  - Continue BB therapy  - He states he has had issues with medication in the past due to excessive fatigue, inability to ambulate  - will defer to primary cardiology    Hypothyroidism  - The patient's labs revealed TSH 14.140, free T4 1.38.  This is likely contributing to arrhythmia.  Medicine is following and managing.  Recent acute illness may be contributing. Much appreciated.      Case discussed with Dr. Shah.  Plan to discharge home.  Continue current medication regimen.  Keep appointment for VT ablation on 3/21/2025.       I discussed the patients findings and my recommendations with patient    VALARIE Tinajero  Electrophysiology  [facility]

## 2025-03-12 NOTE — ED NOTES
Sonido Newsome    Nursing Report ED to Floor:  Mental status: AO4  Ambulatory status: UAL  Oxygen Therapy:  RA  Cardiac Rhythm: SINUS NIALL  Admitted from: HOME  Safety Concerns: N/A  Precautions: N/A  Social Issues: N/A  ED Room #:  17    ED Nurse Phone Extension - [phone] or may call [phone].      HPI:   Chief Complaint   Patient presents with    Irregular Heart Beat       Past Medical History:  Past Medical History:   Diagnosis Date    CAD 7/8/2021    HFrEF (EF <20%) 7/8/2021    TTE 3/12/2014:   EF estimated to be less than 20%  Moderate mitral regurgitation.     Moderate MR 7/8/2021    Myocardial infarction     Tobacco use 7/8/2021        Past Surgical History:  Past Surgical History:   Procedure Laterality Date    CARDIAC CATHETERIZATION N/A 7/8/2021    Procedure: Left Heart Cath;  Surgeon: Mega Ellison MD;  Location: [facility] CATH INVASIVE LOCATION;  Service: Cardiovascular;  Laterality: N/A;    CARDIAC DEFIBRILLATOR PLACEMENT          Admitting Doctor:   Ketty Doherty II, DO    Consulting Provider(s):  Consults       Date and Time Order Name Status Description    3/12/2025  4:29 AM Inpatient Cardiology Consult               Admitting Diagnosis:   The primary encounter diagnosis was Ventricular tachycardia. Diagnoses of Chest discomfort and Palpitations were also pertinent to this visit.    Most Recent Vitals:   Vitals:    03/12/25 0653 03/12/25 0658 03/12/25 0824 03/12/25 0826   BP:   138/84 138/84   Pulse: 52 51 56 55   Resp:       Temp:       TempSrc:       SpO2: 93% 93%  97%   Weight:       Height:           Active LDAs/IV Access:   Lines, Drains & Airways       Active LDAs       Name Placement date Placement time Site Days    Peripheral IV 03/12/25 0241 Anterior;Right Forearm 03/12/25  0241  Forearm  less than 1                    Labs (abnormal labs have a star):   Labs Reviewed   TSH RFX ON ABNORMAL TO FREE T4 - Abnormal; Notable for the following components:       Result Value    TSH  14.140 (*)     All other components within normal limits   CBC WITH AUTO DIFFERENTIAL - Abnormal; Notable for the following components:    WBC 13.20 (*)     RDW 18.4 (*)     RDW-SD 60.4 (*)     Platelets 498 (*)     Neutrophil % 78.2 (*)     Lymphocyte % 12.2 (*)     Basophil % 1.8 (*)     Immature Grans % 0.7 (*)     Neutrophils, Absolute 10.33 (*)     Basophils, Absolute 0.24 (*)     Immature Grans, Absolute 0.09 (*)     All other components within normal limits    Narrative:     Appended report. These results have been appended to a previously verified report.   CBC WITH AUTO DIFFERENTIAL - Abnormal; Notable for the following components:    RDW 18.5 (*)     RDW-SD 61.9 (*)     Lymphocyte % 17.2 (*)     Basophil % 2.0 (*)     Neutrophils, Absolute 7.05 (*)     All other components within normal limits    Narrative:     Appended report. These results have been appended to a previously verified report.   MAGNESIUM - Normal   BNP (IN-HOUSE) - Normal    Narrative:     This assay is used as an aid in the diagnosis of individuals suspected of having heart failure. It can be used as an aid in the diagnosis of acute decompensated heart failure (ADHF) in patients presenting with signs and symptoms of ADHF to the emergency department (ED). In addition, NT-proBNP of <300 pg/mL indicates ADHF is not likely.    Age Range Result Interpretation  NT-proBNP Concentration (pg/mL:      <50             Positive            >450                   Gray                 300-450                    Negative             <300    50-75           Positive            >900                  Gray                300-900                  Negative            <300      >75             Positive            >1800                  Gray                300-1800                  Negative            <300   T4, FREE - Normal   RAINBOW DRAW    Narrative:     The following orders were created for panel order Justice Draw.  Procedure                                Abnormality         Status                     ---------                               -----------         ------                     Green Top (Gel)[[id number]]                                  Final result               Lavender Top[[id number]]                                     Final result               Gold Top - SST[[id number]]                                   Final result               Gomez Top[[id number]]                                         Final result               Light Blue Top[[id number]]                                   Final result                 Please view results for these tests on the individual orders.   COMPREHENSIVE METABOLIC PANEL    Narrative:     GFR Categories in Chronic Kidney Disease (CKD)      GFR Category          GFR (mL/min/1.73)    Interpretation  G1                     90 or greater         Normal or high (1)  G2                      60-89                Mild decrease (1)  G3a                   45-59                Mild to moderate decrease  G3b                   30-44                Moderate to severe decrease  G4                    15-29                Severe decrease  G5                    14 or less           Kidney failure          (1)In the absence of evidence of kidney disease, neither GFR category G1 or G2 fulfill the criteria for CKD.    eGFR calculation 2021 CKD-EPI creatinine equation, which does not include race as a factor   SCAN SLIDE   COMPREHENSIVE METABOLIC PANEL    Narrative:     GFR Categories in Chronic Kidney Disease (CKD)      GFR Category          GFR (mL/min/1.73)    Interpretation  G1                     90 or greater         Normal or high (1)  G2                      60-89                Mild decrease (1)  G3a                   45-59                Mild to moderate decrease  G3b                   30-44                Moderate to severe decrease  G4                    15-29                Severe decrease  G5                    14 or less           Kidney  failure          (1)In the absence of evidence of kidney disease, neither GFR category G1 or G2 fulfill the criteria for CKD.    eGFR calculation 2021 CKD-EPI creatinine equation, which does not include race as a factor   SCAN SLIDE   CBC AND DIFFERENTIAL    Narrative:     The following orders were created for panel order CBC & Differential.  Procedure                               Abnormality         Status                     ---------                               -----------         ------                     CBC Auto Differential[[id number]]        Abnormal            Final result               Scan Slide[[id number]]                                       Final result                 Please view results for these tests on the individual orders.   GREEN TOP   LAVENDER TOP   GOLD TOP - SST   GRAY TOP   LIGHT BLUE TOP       Meds Given in ED:   Medications   sodium chloride 0.9 % flush 10 mL (has no administration in time range)   amiodarone (PACERONE) tablet 200 mg (200 mg Oral Given 3/12/25 0828)   aspirin EC tablet 81 mg (81 mg Oral Given 3/12/25 0824)   carvedilol (COREG) tablet 12.5 mg (12.5 mg Oral Given 3/12/25 0824)   losartan (COZAAR) tablet 25 mg (25 mg Oral Given 3/12/25 0824)   sodium chloride 0.9 % flush 10 mL (has no administration in time range)   sodium chloride 0.9 % flush 10 mL (has no administration in time range)   sennosides-docusate (PERICOLACE) 8.6-50 MG per tablet 2 tablet (has no administration in time range)     And   polyethylene glycol (MIRALAX) packet 17 g (has no administration in time range)     And   bisacodyl (DULCOLAX) EC tablet 5 mg (has no administration in time range)     And   bisacodyl (DULCOLAX) suppository 10 mg (has no administration in time range)   Potassium Replacement - Follow Nurse / BPA Driven Protocol (has no administration in time range)   Magnesium Standard Dose Replacement - Follow Nurse / BPA Driven Protocol (has no administration in time range)   Phosphorus  Replacement - Follow Nurse / BPA Driven Protocol (has no administration in time range)   Calcium Replacement - Follow Nurse / BPA Driven Protocol (has no administration in time range)   enoxaparin sodium (LOVENOX) syringe 40 mg (40 mg Subcutaneous Given 3/12/25 0824)   acetaminophen (TYLENOL) tablet 650 mg (has no administration in time range)     Or   acetaminophen (TYLENOL) 160 MG/5ML oral solution 650 mg (has no administration in time range)     Or   acetaminophen (TYLENOL) suppository 650 mg (has no administration in time range)   melatonin tablet 5 mg (has no administration in time range)   nicotine (NICODERM CQ) 14 MG/24HR patch 1 patch (has no administration in time range)           Last NIH score:                                                          Dysphagia screening results:  Patient Factors Component (Dysphagia:Stroke or Rule-out)  Best Eye Response: (S) 4-->(E4) spontaneous (03/12/25 0833)  Best Motor Response: (S) 6-->(M6) obeys commands (03/12/25 0833)  Best Verbal Response: (S) 5-->(V5) oriented (03/12/25 0833)  Fenton Coma Scale Score: 15 (03/12/25 0833)     Fenton Coma Scale:  No data recorded     CIWA:        Restraint Type:            Isolation Status:  No active isolations

## 2025-03-12 NOTE — DISCHARGE SUMMARY
[facility] Medicine Services  ADMISSION FOLLOW-UP NOTE          Patient admitted after midnight, H&P by my partner performed earlier on today's date reviewed.  Interim findings, labs, and charting also reviewed.        The [facility] Problem List has been managed and updated to include any new diagnoses:  Active Hospital Problems    Diagnosis  POA    Essential hypertension [I10]  Yes    Hyperlipidemia LDL goal <70 [E78.5]  Yes    Tobacco use [Z72.0]  Yes      Resolved Hospital Problems    Diagnosis Date Resolved POA    **Non-sustained ventricular tachycardia [I47.29] 03/12/2025 Yes         ADDITIONAL PLAN:  - detailed assessment and plan from admission reviewed    Sonido Newsome is a 49 y.o. male with significant medical history for HTN, HLD, ventricular tachycardia, heart failure with preserved ejection fraction s/p [employer] ICD, CAD, and tobacco use who presents to [facility] ED with complaints of palpitations.  Patient had a recent admission to OSH for sustained V. tach and was cardioverted in February 13 th.      Nonsustained ventricular tachycardia  -[employer] ICD interrogated in ED, 10 sec run VT. Seen by EP - rec'd no changes and discharge. I d/w them prior to d/c.  -Keep appt as scheduled.    Elevated TSH  -Likely result of recent PNA but has nl T4. Repeat in 4-6 weeks.     Expected Discharge   Expected Discharge Date: 3/12/2025; Expected Discharge Time:      Ketty Doherty II, DO  03/12/25

## 2025-03-12 NOTE — H&P
[facility] Medicine Services  HISTORY AND PHYSICAL    Patient Name: Sonido Newsome  : 1975  MRN: [id number]  Primary Care Physician: Abi Granados PA-C  Date of admission: 3/12/2025    Subjective   Subjective     Chief Complaint:  Palpitations    HPI:  Sonido Newsome is a 49 y.o. male with significant medical history for HTN, HLD, ventricular tachycardia, heart failure with preserved ejection fraction s/p [employer] ICD, CAD, and tobacco use who presents to [facility] ED with complaints of palpitations.  Patient had a recent admission to OSH for sustained V. tach and was cardioverted in .     Patient reports to [facility] ED with complaints of palpitation and chest discomfort.  He was working on his vehicle about 3:00 in the afternoon on 3/11 when he began to have palpitations.  Palpitations lasted 20 to 30 minutes.  He denies chest pain or shortness of breath during this episode, but does report chest tightness.  Upon arrival to ED in interrogation of his pacemaker, it was confirmed that he had an episode of unsustained V. tach for approximately 20 seconds and self terminated.    Patient denies headache, fever, body aches, or chills.  He denies shortness of breath or chest pain, but does report chest tightness during the episode above.  He denies lower extremity edema.  No complaints of nausea, vomiting, or diarrhea.    Review of Systems   Constitutional:  Negative for activity change, chills, fatigue and fever.   HENT: Negative.     Eyes: Negative.    Respiratory:  Positive for chest tightness. Negative for cough and shortness of breath.    Cardiovascular:  Negative for chest pain and leg swelling.   Gastrointestinal:  Negative for abdominal distention, abdominal pain, diarrhea, nausea and vomiting.   Endocrine: Negative.    Genitourinary: Negative.    Musculoskeletal: Negative.    Skin: Negative.     Allergic/Immunologic: Negative.    Neurological:  Negative for dizziness, syncope, light-headedness and headaches.   Hematological: Negative.    Psychiatric/Behavioral: Negative.              Personal History     Past Medical History:   Diagnosis Date    CAD 7/8/2021    HFrEF (EF <20%) 7/8/2021    TTE 3/12/2014:   EF estimated to be less than 20%  Moderate mitral regurgitation.     Moderate MR 7/8/2021    Myocardial infarction     Tobacco use 7/8/2021             Past Surgical History:   Procedure Laterality Date    CARDIAC CATHETERIZATION N/A 7/8/2021    Procedure: Left Heart Cath;  Surgeon: Mega Ellison MD;  Location: [facility] CATH INVASIVE LOCATION;  Service: Cardiovascular;  Laterality: N/A;    CARDIAC DEFIBRILLATOR PLACEMENT         Family History: family history includes Coronary artery disease in his mother; Deep vein thrombosis in his father; Heart disease in his father; No Known Problems in his maternal grandfather, maternal grandmother, and paternal grandfather; Other in his sister; Stroke in his paternal grandmother.     Social History:  reports that he has been smoking cigarettes. He has never used smokeless tobacco. He reports that he does not currently use alcohol. Drug use questions deferred to the physician.  Social History     Social History Narrative    Caffeine        Medications:  amiodarone, aspirin, carvedilol, and losartan    Allergies   Allergen Reactions    Atorvastatin Myalgia    Jardiance [Empagliflozin] Dizziness       Objective   Objective     Vital Signs:   Temp:  [98.1 °F (36.7 °C)] 98.1 °F (36.7 °C)  Heart Rate:  [51-69] 54  Resp:  [16] 16  BP: (112-143)/() 133/81    Physical Exam  Constitutional:       Appearance: Normal appearance.   HENT:      Head: Normocephalic and atraumatic.   Eyes:      Extraocular Movements: Extraocular movements intact.      Pupils: Pupils are equal, round, and reactive to light.   Cardiovascular:      Rate and Rhythm: Normal rate and regular  rhythm.      Pulses: Normal pulses.      Heart sounds: Normal heart sounds.   Pulmonary:      Effort: Pulmonary effort is normal.      Breath sounds: Normal breath sounds.   Abdominal:      General: Bowel sounds are normal. There is no distension.      Palpations: Abdomen is soft.      Tenderness: There is no abdominal tenderness.   Musculoskeletal:      Right lower leg: No edema.      Left lower leg: No edema.   Skin:     General: Skin is warm and dry.   Neurological:      General: No focal deficit present.      Mental Status: He is alert and oriented to person, place, and time.   Psychiatric:         Mood and Affect: Mood normal.         Behavior: Behavior normal.          Result Review:  I have personally reviewed the results from the time of this admission to 3/12/2025 03:25 EDT and agree with these findings:  [x]  Laboratory list / accordion  []  Microbiology  [x]  Radiology  [x]  EKG/Telemetry   []  Cardiology/Vascular   []  Pathology  [x]  Old records  []  Other:      LAB RESULTS:      Lab 03/11/25 2011   WBC 13.20*   HEMOGLOBIN 15.2   HEMATOCRIT 47.5   PLATELETS 498*   NEUTROS ABS 10.33*   IMMATURE GRANS (ABS) 0.09*   LYMPHS ABS 1.61   MONOS ABS 0.68   EOS ABS 0.25   MCV 90.6         Lab 03/11/25 2011   SODIUM 138   POTASSIUM 4.1   CHLORIDE 101   CO2 25.0   ANION GAP 12.0   BUN 9   CREATININE 1.10   EGFR 83.3   GLUCOSE 96   CALCIUM 9.4   MAGNESIUM 2.2   TSH 14.140*         Lab 03/11/25 2011   TOTAL PROTEIN 7.0   ALBUMIN 4.6   GLOBULIN 2.4   ALT (SGPT) 15   AST (SGOT) 17   BILIRUBIN 0.6   ALK PHOS 72         Lab 03/11/25 2011   PROBNP 376.7                 Brief Urine Lab Results       None          Microbiology Results (last 10 days)       ** No results found for the last 240 hours. **            XR Chest 1 View  Result Date: 3/11/2025  XR CHEST 1 VW Date of Exam: 3/11/2025 5:08 PM EDT Indication: Dysrhythmia triage protocol Comparison: 4/16/2021 Findings: Cardiomediastinal silhouette is stable. There is  a single lead left-sided AICD. No airspace disease, pneumothorax, nor pleural effusion. No acute osseous abnormality identified.     Impression: Impression: No acute cardiopulmonary abnormality. Electronically Signed: Tanya Carranza MD  3/11/2025 5:19 PM EDT  Workstation ID: [id number]      Results for orders placed in visit on 03/03/25    Adult Transthoracic Echo Complete W/ Cont if Necessary Per Protocol    Interpretation Summary    Left ventricular systolic function is moderately decreased. Calculated left ventricular EF = 29% Left ventricular ejection fraction appears to be 26 - 30%.    The left ventricular cavity is moderately dilated.    The following left ventricular wall segments are hypokinetic: mid anterior, basal anterolateral, mid anterolateral, basal inferolateral, mid inferolateral, mid inferior, basal inferoseptal, mid inferoseptal, mid anteroseptal, basal anterior, basal inferior and basal inferoseptal. The following left ventricular wall segments are akinetic: apical anterior, apical lateral, apical inferior, apical septal and apex.    Left atrial volume is mildly increased.    The right atrial cavity is dilated.    Moderate mitral valve regurgitation is present.    Estimated right ventricular systolic pressure from tricuspid regurgitation is normal (<35 mmHg). Calculated right ventricular systolic pressure from tricuspid regurgitation is 20 mmHg.    EF slightly improved compared to the echocardiogram from 2021 when it was estimated at 20%.      Assessment & Plan   Assessment & Plan       Non-sustained ventricular tachycardia    Tobacco use    Hyperlipidemia LDL goal <70    Essential hypertension    Sonido Newsome is a 49 y.o. male with significant medical history for HTN, HLD, ventricular tachycardia, heart failure with preserved ejection fraction s/p [employer] ICD, CAD, and tobacco use who presents to [facility] ED with complaints of palpitations.  Patient had a recent  admission to OSH for sustained V. tach and was cardioverted in February 13 th.     HTN  HFrEF  Nonsustained ventricular tachycardia  -[employer] ICD interrogated in ED  -Echo March 2025 EF 26 to 30%  -Cardioversion February 13 th 2025  -Cardiology consult in a.m., follows with Dr. Shah  -Continue amiodarone, carvedilol, losartan, and aspirin  -N.p.o. except sips with meds  -CBC and CMP in a.m.    Subclinical hypothyroidism  -TSH 14   -T4 1.38  -I suspect this is due to amiodarone    Tobacco abuse disorder  -NRT    DVT prophylaxis: Lovenox    CODE STATUS: Full code  Code Status (Patient has no pulse and is not breathing): CPR (Attempt to Resuscitate)  Medical Interventions (Patient has pulse or is breathing): Full Support  Level Of Support Discussed With: Patient      Expected Discharge  Expected Discharge Date: 3/14/2025; Expected Discharge Time: 12:00 PM      This note has been completed as part of a split-shared workflow.     Signature: Electronically signed by VALARIE Matias, 03/12/25, 3:14 AM EDT

## 2025-03-12 NOTE — ED NOTES
Sonido Newsome    Nursing Report ED to Floor:  Mental status: A&Ox4  Ambulatory status: IND  Oxygen Therapy:  RA  Cardiac Rhythm: Bradycardia, v-tach (earlier today per pacemaker interrrogation)  Admitted from: Home  Safety Concerns:  n/a  Precautions: n/a  Social Issues: n/a  ED Room #:  17    ED Nurse Phone Extension - [phone] or may call [phone].      HPI:   Chief Complaint   Patient presents with    Irregular Heart Beat       Past Medical History:  Past Medical History:   Diagnosis Date    CAD 7/8/2021    HFrEF (EF <20%) 7/8/2021    TTE 3/12/2014:   EF estimated to be less than 20%  Moderate mitral regurgitation.     Moderate MR 7/8/2021    Myocardial infarction     Tobacco use 7/8/2021        Past Surgical History:  Past Surgical History:   Procedure Laterality Date    CARDIAC CATHETERIZATION N/A 7/8/2021    Procedure: Left Heart Cath;  Surgeon: Mega Elliosn MD;  Location:  [facility];  Service: Cardiovascular;  Laterality: N/A;    CARDIAC DEFIBRILLATOR PLACEMENT          Admitting Doctor:   No admitting provider for patient encounter.    Consulting Provider(s):  Consults       No orders found from 2/11/2025 to 3/13/2025.             Admitting Diagnosis:   The primary encounter diagnosis was Ventricular tachycardia. Diagnoses of Chest discomfort and Palpitations were also pertinent to this visit.    Most Recent Vitals:   Vitals:    03/12/25 0216 03/12/25 0221 03/12/25 0226 03/12/25 0231   BP:  133/81     Pulse: 55 62 61 54   Resp:       Temp:       TempSrc:       SpO2: 94% 98% 95% 92%   Weight:       Height:           Active LDAs/IV Access:   Lines, Drains & Airways       Active LDAs       None                    Labs (abnormal labs have a star):   Labs Reviewed   TSH RFX ON ABNORMAL TO FREE T4 - Abnormal; Notable for the following components:       Result Value    TSH 14.140 (*)     All other components within normal limits   CBC WITH AUTO DIFFERENTIAL - Abnormal; Notable for the  following components:    WBC 13.20 (*)     RDW 18.4 (*)     RDW-SD 60.4 (*)     Platelets 498 (*)     Neutrophil % 78.2 (*)     Lymphocyte % 12.2 (*)     Basophil % 1.8 (*)     Immature Grans % 0.7 (*)     Neutrophils, Absolute 10.33 (*)     Basophils, Absolute 0.24 (*)     Immature Grans, Absolute 0.09 (*)     All other components within normal limits    Narrative:     Appended report. These results have been appended to a previously verified report.   MAGNESIUM - Normal   BNP (IN-HOUSE) - Normal    Narrative:     This assay is used as an aid in the diagnosis of individuals suspected of having heart failure. It can be used as an aid in the diagnosis of acute decompensated heart failure (ADHF) in patients presenting with signs and symptoms of ADHF to the emergency department (ED). In addition, NT-proBNP of <300 pg/mL indicates ADHF is not likely.    Age Range Result Interpretation  NT-proBNP Concentration (pg/mL:      <50             Positive            >450                   Gray                 300-450                    Negative             <300    50-75           Positive            >900                  Gray                300-900                  Negative            <300      >75             Positive            >1800                  Gray                300-1800                  Negative            <300   T4, FREE - Normal   RAINBOW DRAW    Narrative:     The following orders were created for panel order [place] Draw.  Procedure                               Abnormality         Status                     ---------                               -----------         ------                     Green Top (Gel)[[id number]]                                  Final result               Lavender Top[[id number]]                                     Final result               Gold Top - SST[[id number]]                                   Final result               Gomez Top[[id number]]                                         Final  result               Light Blue Top[[id number]]                                   Final result                 Please view results for these tests on the individual orders.   COMPREHENSIVE METABOLIC PANEL    Narrative:     GFR Categories in Chronic Kidney Disease (CKD)      GFR Category          GFR (mL/min/1.73)    Interpretation  G1                     90 or greater         Normal or high (1)  G2                      60-89                Mild decrease (1)  G3a                   45-59                Mild to moderate decrease  G3b                   30-44                Moderate to severe decrease  G4                    15-29                Severe decrease  G5                    14 or less           Kidney failure          (1)In the absence of evidence of kidney disease, neither GFR category G1 or G2 fulfill the criteria for CKD.    eGFR calculation 2021 CKD-EPI creatinine equation, which does not include race as a factor   SCAN SLIDE   CBC AND DIFFERENTIAL    Narrative:     The following orders were created for panel order CBC & Differential.  Procedure                               Abnormality         Status                     ---------                               -----------         ------                     CBC Auto Differential[[id number]]        Abnormal            Final result               Scan Slide[[id number]]                                       Final result                 Please view results for these tests on the individual orders.   GREEN TOP   LAVENDER TOP   GOLD TOP - SST   GRAY TOP   LIGHT BLUE TOP       Meds Given in ED:   Medications   sodium chloride 0.9 % flush 10 mL (has no administration in time range)           Last NIH score:                                                          Dysphagia screening results:        Honey Coma Scale:  No data recorded     CIWA:        Restraint Type:            Isolation Status:  No active isolations

## 2025-03-12 NOTE — OUTREACH NOTE
Prep Survey      Flowsheet Row Responses   [facility] patient discharged from? [place]   Is LACE score < 7 ? Yes   Eligibility [facility]   Date of Admission 03/12/25   Date of Discharge 03/12/25   Discharge Disposition Home or Self Care   Discharge diagnosis Non-sustained ventricular tachycardia   Does the patient have one of the following disease processes/diagnoses(primary or secondary)? Other   Does the patient have Home health ordered? No   Is there a DME ordered? No   Prep survey completed? Yes            Marian MISTRY - Registered Nurse

## 2025-03-13 ENCOUNTER — TRANSITIONAL CARE MANAGEMENT TELEPHONE ENCOUNTER (OUTPATIENT)
Dept: CALL CENTER | Facility: HOSPITAL | Age: 50
End: 2025-03-13
Payer: COMMERCIAL

## 2025-03-13 NOTE — OUTREACH NOTE
Call Center TCM Note      Flowsheet Row Responses   [facility] patient discharged from? [place]   Does the patient have one of the following disease processes/diagnoses(primary or secondary)? Other   TCM attempt successful? Yes   Call start time 1025   Call end time 1029   Meds reviewed with patient/caregiver? Yes   Is the patient having any side effects they believe may be caused by any medication additions or changes? No   Does the patient have all medications ordered at discharge? N/A   Is the patient taking all medications as directed (includes completed medication regime)? Yes   Comments Hospital d/c f/u appt on 3/14/25 @11am   Does the patient have an appointment with their PCP within 7-14 days of discharge? Yes   Has home health visited the patient within 72 hours of discharge? N/A   Psychosocial issues? No   What is the patient's perception of their health status since discharge? Improving   Is the patient/caregiver able to teach back the hierarchy of who to call/visit for symptoms/problems? PCP, Specialist, Home health nurse, Urgent Care, ED, 911 Yes   TCM call completed? Yes   Call end time 1029   Would this patient benefit from a Referral to Amb Social Work? No   Is the patient interested in additional calls from an ambulatory ? No            Radha Cartwright RN    3/13/2025, 10:29 EDT         negative

## 2025-03-18 LAB
QT INTERVAL: 460 MS
QTC INTERVAL: 478 MS

## 2025-03-18 RX ORDER — CARVEDILOL 12.5 MG/1
12.5 TABLET ORAL 2 TIMES DAILY WITH MEALS
Qty: 180 TABLET | Refills: 0 | Status: SHIPPED | OUTPATIENT
Start: 2025-03-18

## 2025-03-21 ENCOUNTER — HOSPITAL ENCOUNTER (OUTPATIENT)
Facility: HOSPITAL | Age: 50
Discharge: HOME | End: 2025-03-22
Attending: INTERNAL MEDICINE | Admitting: INTERNAL MEDICINE
Payer: COMMERCIAL

## 2025-03-21 ENCOUNTER — ANESTHESIA EVENT CONVERTED (OUTPATIENT)
Dept: ANESTHESIOLOGY | Facility: HOSPITAL | Age: 50
End: 2025-03-21
Payer: COMMERCIAL

## 2025-03-21 ENCOUNTER — ANESTHESIA EVENT (OUTPATIENT)
Dept: CARDIOLOGY | Facility: HOSPITAL | Age: 50
End: 2025-03-21
Payer: COMMERCIAL

## 2025-03-21 ENCOUNTER — ANESTHESIA (OUTPATIENT)
Dept: CARDIOLOGY | Facility: HOSPITAL | Age: 50
End: 2025-03-21
Payer: COMMERCIAL

## 2025-03-21 DIAGNOSIS — I47.29 VENTRICULAR TACHYCARDIA (PAROXYSMAL): ICD-10-CM

## 2025-03-21 DIAGNOSIS — I47.20 VENTRICULAR TACHYCARDIA: ICD-10-CM

## 2025-03-21 LAB
BASE EXCESS BLDA CALC-SCNC: -4 MMOL/L (ref -5–5)
BASE EXCESS BLDA CALC-SCNC: -5 MMOL/L (ref -5–5)
BASE EXCESS BLDA CALC-SCNC: -5 MMOL/L (ref -5–5)
CA-I BLDA-SCNC: 1 MMOL/L (ref 1.2–1.32)
CA-I BLDA-SCNC: 1.02 MMOL/L (ref 1.2–1.32)
CA-I BLDA-SCNC: 1.12 MMOL/L (ref 1.2–1.32)
CO2 BLDA-SCNC: 21 MMOL/L (ref 24–29)
CO2 BLDA-SCNC: 22 MMOL/L (ref 24–29)
CO2 BLDA-SCNC: 24 MMOL/L (ref 24–29)
GLUCOSE BLDC GLUCOMTR-MCNC: 101 MG/DL (ref 70–130)
GLUCOSE BLDC GLUCOMTR-MCNC: 104 MG/DL (ref 70–130)
GLUCOSE BLDC GLUCOMTR-MCNC: 98 MG/DL (ref 70–130)
HBA1C MFR BLD: 5.1 % (ref 4.8–5.6)
HCO3 BLDA-SCNC: 20.1 MMOL/L (ref 22–26)
HCO3 BLDA-SCNC: 20.7 MMOL/L (ref 22–26)
HCO3 BLDA-SCNC: 22.3 MMOL/L (ref 22–26)
HCT VFR BLDA CALC: 40 % (ref 38–51)
HCT VFR BLDA CALC: 40 % (ref 38–51)
HCT VFR BLDA CALC: 42 % (ref 38–51)
HGB BLDA-MCNC: 13.6 G/DL (ref 12–17)
HGB BLDA-MCNC: 13.6 G/DL (ref 12–17)
HGB BLDA-MCNC: 14.3 G/DL (ref 12–17)
PCO2 BLDA: 35.6 MM HG (ref 35–45)
PCO2 BLDA: 35.8 MM HG (ref 35–45)
PCO2 BLDA: 42.3 MM HG (ref 35–45)
PH BLDA: 7.33 PH UNITS (ref 7.35–7.6)
PH BLDA: 7.36 PH UNITS (ref 7.35–7.6)
PH BLDA: 7.37 PH UNITS (ref 7.35–7.6)
PO2 BLDA: 167 MMHG (ref 80–105)
PO2 BLDA: 169 MMHG (ref 80–105)
PO2 BLDA: 172 MMHG (ref 80–105)
POTASSIUM BLDA-SCNC: 3.5 MMOL/L (ref 3.5–4.9)
POTASSIUM BLDA-SCNC: 3.6 MMOL/L (ref 3.5–4.9)
POTASSIUM BLDA-SCNC: 3.6 MMOL/L (ref 3.5–4.9)
SAO2 % BLDA: 99 % (ref 95–98)
SODIUM BLD-SCNC: 142 MMOL/L (ref 138–146)
SODIUM BLD-SCNC: 142 MMOL/L (ref 138–146)
SODIUM BLD-SCNC: 143 MMOL/L (ref 138–146)

## 2025-03-21 PROCEDURE — 82947 ASSAY GLUCOSE BLOOD QUANT: CPT

## 2025-03-21 PROCEDURE — C1760 CLOSURE DEV, VASC: HCPCS

## 2025-03-21 PROCEDURE — 85347 COAGULATION TIME ACTIVATED: CPT

## 2025-03-21 PROCEDURE — C1730 CATH, EP, 19 OR FEW ELECT: HCPCS | Performed by: INTERNAL MEDICINE

## 2025-03-21 PROCEDURE — 82330 ASSAY OF CALCIUM: CPT

## 2025-03-21 PROCEDURE — 84132 ASSAY OF SERUM POTASSIUM: CPT

## 2025-03-21 PROCEDURE — C1893 INTRO/SHEATH, FIXED,NON-PEEL: HCPCS | Performed by: INTERNAL MEDICINE

## 2025-03-21 PROCEDURE — 25810000003 SODIUM CHLORIDE 0.9 % SOLUTION 250 ML FLEX CONT

## 2025-03-21 PROCEDURE — 25810000003 LACTATED RINGERS PER 1000 ML: Performed by: ANESTHESIOLOGY

## 2025-03-21 PROCEDURE — C1766 INTRO/SHEATH,STRBLE,NON-PEEL: HCPCS | Performed by: INTERNAL MEDICINE

## 2025-03-21 PROCEDURE — 84295 ASSAY OF SERUM SODIUM: CPT

## 2025-03-21 PROCEDURE — 82803 BLOOD GASES ANY COMBINATION: CPT

## 2025-03-21 PROCEDURE — 93462 L HRT CATH TRNSPTL PUNCTURE: CPT | Performed by: INTERNAL MEDICINE

## 2025-03-21 PROCEDURE — C1769 GUIDE WIRE: HCPCS | Performed by: INTERNAL MEDICINE

## 2025-03-21 PROCEDURE — 25010000002 ONDANSETRON PER 1 MG

## 2025-03-21 PROCEDURE — 25010000002 DEXAMETHASONE PER 1 MG

## 2025-03-21 PROCEDURE — 25010000002 LIDOCAINE PF 1% 1 % SOLUTION

## 2025-03-21 PROCEDURE — C1760 CLOSURE DEV, VASC: HCPCS | Performed by: INTERNAL MEDICINE

## 2025-03-21 PROCEDURE — 25010000002 PHENYLEPHRINE 10 MG/ML SOLUTION 1 ML VIAL

## 2025-03-21 PROCEDURE — 99232 SBSQ HOSP IP/OBS MODERATE 35: CPT | Performed by: INTERNAL MEDICINE

## 2025-03-21 PROCEDURE — 25010000002 SUGAMMADEX 200 MG/2ML SOLUTION

## 2025-03-21 PROCEDURE — 25010000002 FUROSEMIDE PER 20 MG: Performed by: INTERNAL MEDICINE

## 2025-03-21 PROCEDURE — 93662 INTRACARDIAC ECG (ICE): CPT | Performed by: INTERNAL MEDICINE

## 2025-03-21 PROCEDURE — 25010000002 PROPOFOL 10 MG/ML EMULSION

## 2025-03-21 PROCEDURE — 25010000002 HEPARIN (PORCINE) PER 1000 UNITS: Performed by: INTERNAL MEDICINE

## 2025-03-21 PROCEDURE — 93654 COMPRE EP EVAL TX VT: CPT | Performed by: INTERNAL MEDICINE

## 2025-03-21 PROCEDURE — C1732 CATH, EP, DIAG/ABL, 3D/VECT: HCPCS | Performed by: INTERNAL MEDICINE

## 2025-03-21 PROCEDURE — C1759 CATH, INTRA ECHOCARDIOGRAPHY: HCPCS | Performed by: INTERNAL MEDICINE

## 2025-03-21 PROCEDURE — 85014 HEMATOCRIT: CPT

## 2025-03-21 PROCEDURE — 25010000002 PROTAMINE SULFATE PER 10 MG: Performed by: INTERNAL MEDICINE

## 2025-03-21 PROCEDURE — C2630 CATH, EP, COOL-TIP: HCPCS | Performed by: INTERNAL MEDICINE

## 2025-03-21 PROCEDURE — C1894 INTRO/SHEATH, NON-LASER: HCPCS | Performed by: INTERNAL MEDICINE

## 2025-03-21 PROCEDURE — 25010000002 GLYCOPYRROLATE 1 MG/5ML SOLUTION

## 2025-03-21 PROCEDURE — 83036 HEMOGLOBIN GLYCOSYLATED A1C: CPT | Performed by: NURSE PRACTITIONER

## 2025-03-21 RX ORDER — HYDRALAZINE HYDROCHLORIDE 20 MG/ML
5 INJECTION INTRAMUSCULAR; INTRAVENOUS
Status: DISCONTINUED | OUTPATIENT
Start: 2025-03-21 | End: 2025-03-22 | Stop reason: HOSPADM

## 2025-03-21 RX ORDER — ONDANSETRON 2 MG/ML
INJECTION INTRAMUSCULAR; INTRAVENOUS AS NEEDED
Status: DISCONTINUED | OUTPATIENT
Start: 2025-03-21 | End: 2025-03-21 | Stop reason: SURG

## 2025-03-21 RX ORDER — IPRATROPIUM BROMIDE AND ALBUTEROL SULFATE 2.5; .5 MG/3ML; MG/3ML
3 SOLUTION RESPIRATORY (INHALATION) ONCE AS NEEDED
Status: DISCONTINUED | OUTPATIENT
Start: 2025-03-21 | End: 2025-03-22 | Stop reason: HOSPADM

## 2025-03-21 RX ORDER — HYDROCODONE BITARTRATE AND ACETAMINOPHEN 5; 325 MG/1; MG/1
1 TABLET ORAL ONCE AS NEEDED
Status: DISCONTINUED | OUTPATIENT
Start: 2025-03-21 | End: 2025-03-22 | Stop reason: HOSPADM

## 2025-03-21 RX ORDER — NITROGLYCERIN 0.4 MG/1
0.4 TABLET SUBLINGUAL
Status: DISCONTINUED | OUTPATIENT
Start: 2025-03-21 | End: 2025-03-21 | Stop reason: HOSPADM

## 2025-03-21 RX ORDER — PROMETHAZINE HYDROCHLORIDE 25 MG/1
25 SUPPOSITORY RECTAL ONCE AS NEEDED
Status: DISCONTINUED | OUTPATIENT
Start: 2025-03-21 | End: 2025-03-22 | Stop reason: HOSPADM

## 2025-03-21 RX ORDER — ROCURONIUM BROMIDE 10 MG/ML
INJECTION, SOLUTION INTRAVENOUS AS NEEDED
Status: DISCONTINUED | OUTPATIENT
Start: 2025-03-21 | End: 2025-03-21 | Stop reason: SURG

## 2025-03-21 RX ORDER — FAMOTIDINE 20 MG/1
20 TABLET, FILM COATED ORAL ONCE
Status: COMPLETED | OUTPATIENT
Start: 2025-03-21 | End: 2025-03-21

## 2025-03-21 RX ORDER — SODIUM CHLORIDE 0.9 % (FLUSH) 0.9 %
10 SYRINGE (ML) INJECTION AS NEEDED
Status: DISCONTINUED | OUTPATIENT
Start: 2025-03-21 | End: 2025-03-22 | Stop reason: HOSPADM

## 2025-03-21 RX ORDER — FAMOTIDINE 10 MG/ML
20 INJECTION, SOLUTION INTRAVENOUS ONCE
Status: COMPLETED | OUTPATIENT
Start: 2025-03-21 | End: 2025-03-21

## 2025-03-21 RX ORDER — PROTAMINE SULFATE 10 MG/ML
INJECTION, SOLUTION INTRAVENOUS
Status: DISCONTINUED | OUTPATIENT
Start: 2025-03-21 | End: 2025-03-21 | Stop reason: HOSPADM

## 2025-03-21 RX ORDER — PROCHLORPERAZINE EDISYLATE 5 MG/ML
10 INJECTION INTRAMUSCULAR; INTRAVENOUS EVERY 6 HOURS PRN
Status: DISCONTINUED | OUTPATIENT
Start: 2025-03-21 | End: 2025-03-22 | Stop reason: HOSPADM

## 2025-03-21 RX ORDER — HEPARIN SODIUM 1000 [USP'U]/ML
INJECTION, SOLUTION INTRAVENOUS; SUBCUTANEOUS
Status: DISCONTINUED | OUTPATIENT
Start: 2025-03-21 | End: 2025-03-21 | Stop reason: HOSPADM

## 2025-03-21 RX ORDER — MIDAZOLAM HYDROCHLORIDE 1 MG/ML
1 INJECTION, SOLUTION INTRAMUSCULAR; INTRAVENOUS
Status: DISCONTINUED | OUTPATIENT
Start: 2025-03-21 | End: 2025-03-22 | Stop reason: HOSPADM

## 2025-03-21 RX ORDER — FUROSEMIDE 10 MG/ML
INJECTION INTRAMUSCULAR; INTRAVENOUS
Status: DISCONTINUED | OUTPATIENT
Start: 2025-03-21 | End: 2025-03-21 | Stop reason: HOSPADM

## 2025-03-21 RX ORDER — SODIUM CHLORIDE 9 MG/ML
40 INJECTION, SOLUTION INTRAVENOUS AS NEEDED
Status: DISCONTINUED | OUTPATIENT
Start: 2025-03-21 | End: 2025-03-21 | Stop reason: HOSPADM

## 2025-03-21 RX ORDER — HYDROMORPHONE HYDROCHLORIDE 1 MG/ML
0.5 INJECTION, SOLUTION INTRAMUSCULAR; INTRAVENOUS; SUBCUTANEOUS
Status: DISCONTINUED | OUTPATIENT
Start: 2025-03-21 | End: 2025-03-22 | Stop reason: HOSPADM

## 2025-03-21 RX ORDER — SODIUM CHLORIDE 0.9 % (FLUSH) 0.9 %
3 SYRINGE (ML) INJECTION EVERY 12 HOURS SCHEDULED
Status: DISCONTINUED | OUTPATIENT
Start: 2025-03-21 | End: 2025-03-22 | Stop reason: HOSPADM

## 2025-03-21 RX ORDER — PROMETHAZINE HYDROCHLORIDE 25 MG/1
25 TABLET ORAL ONCE AS NEEDED
Status: DISCONTINUED | OUTPATIENT
Start: 2025-03-21 | End: 2025-03-22 | Stop reason: HOSPADM

## 2025-03-21 RX ORDER — SODIUM CHLORIDE, SODIUM LACTATE, POTASSIUM CHLORIDE, CALCIUM CHLORIDE 600; 310; 30; 20 MG/100ML; MG/100ML; MG/100ML; MG/100ML
9 INJECTION, SOLUTION INTRAVENOUS CONTINUOUS
Status: ACTIVE | OUTPATIENT
Start: 2025-03-22 | End: 2025-03-22

## 2025-03-21 RX ORDER — LABETALOL HYDROCHLORIDE 5 MG/ML
5 INJECTION, SOLUTION INTRAVENOUS
Status: DISCONTINUED | OUTPATIENT
Start: 2025-03-21 | End: 2025-03-22 | Stop reason: HOSPADM

## 2025-03-21 RX ORDER — DEXAMETHASONE SODIUM PHOSPHATE 4 MG/ML
INJECTION, SOLUTION INTRA-ARTICULAR; INTRALESIONAL; INTRAMUSCULAR; INTRAVENOUS; SOFT TISSUE AS NEEDED
Status: DISCONTINUED | OUTPATIENT
Start: 2025-03-21 | End: 2025-03-21 | Stop reason: SURG

## 2025-03-21 RX ORDER — GLYCOPYRROLATE 0.2 MG/ML
INJECTION INTRAMUSCULAR; INTRAVENOUS AS NEEDED
Status: DISCONTINUED | OUTPATIENT
Start: 2025-03-21 | End: 2025-03-21 | Stop reason: SURG

## 2025-03-21 RX ORDER — NALOXONE HCL 0.4 MG/ML
0.4 VIAL (ML) INJECTION AS NEEDED
Status: DISCONTINUED | OUTPATIENT
Start: 2025-03-21 | End: 2025-03-22 | Stop reason: HOSPADM

## 2025-03-21 RX ORDER — LIDOCAINE HYDROCHLORIDE 10 MG/ML
INJECTION, SOLUTION EPIDURAL; INFILTRATION; INTRACAUDAL; PERINEURAL AS NEEDED
Status: DISCONTINUED | OUTPATIENT
Start: 2025-03-21 | End: 2025-03-21 | Stop reason: SURG

## 2025-03-21 RX ORDER — LIDOCAINE HYDROCHLORIDE 10 MG/ML
0.5 INJECTION, SOLUTION EPIDURAL; INFILTRATION; INTRACAUDAL; PERINEURAL ONCE AS NEEDED
Status: DISCONTINUED | OUTPATIENT
Start: 2025-03-21 | End: 2025-03-22 | Stop reason: HOSPADM

## 2025-03-21 RX ORDER — SODIUM CHLORIDE 9 MG/ML
9 INJECTION, SOLUTION INTRAVENOUS AS NEEDED
Status: DISCONTINUED | OUTPATIENT
Start: 2025-03-21 | End: 2025-03-22 | Stop reason: HOSPADM

## 2025-03-21 RX ORDER — SODIUM CHLORIDE 0.9 % (FLUSH) 0.9 %
10 SYRINGE (ML) INJECTION EVERY 12 HOURS SCHEDULED
Status: DISCONTINUED | OUTPATIENT
Start: 2025-03-21 | End: 2025-03-22 | Stop reason: HOSPADM

## 2025-03-21 RX ORDER — ONDANSETRON 2 MG/ML
4 INJECTION INTRAMUSCULAR; INTRAVENOUS ONCE AS NEEDED
Status: DISCONTINUED | OUTPATIENT
Start: 2025-03-21 | End: 2025-03-22 | Stop reason: HOSPADM

## 2025-03-21 RX ORDER — HEPARIN SODIUM 10000 [USP'U]/100ML
INJECTION, SOLUTION INTRAVENOUS
Status: DISCONTINUED | OUTPATIENT
Start: 2025-03-21 | End: 2025-03-21 | Stop reason: HOSPADM

## 2025-03-21 RX ORDER — SODIUM CHLORIDE 0.9 % (FLUSH) 0.9 %
10 SYRINGE (ML) INJECTION AS NEEDED
Status: DISCONTINUED | OUTPATIENT
Start: 2025-03-21 | End: 2025-03-21 | Stop reason: HOSPADM

## 2025-03-21 RX ORDER — EPHEDRINE SULFATE 50 MG/ML
INJECTION, SOLUTION INTRAVENOUS AS NEEDED
Status: DISCONTINUED | OUTPATIENT
Start: 2025-03-21 | End: 2025-03-21 | Stop reason: SURG

## 2025-03-21 RX ORDER — FENTANYL CITRATE 50 UG/ML
50 INJECTION, SOLUTION INTRAMUSCULAR; INTRAVENOUS
Status: DISCONTINUED | OUTPATIENT
Start: 2025-03-21 | End: 2025-03-22 | Stop reason: HOSPADM

## 2025-03-21 RX ORDER — SODIUM CHLORIDE 0.9 % (FLUSH) 0.9 %
3-10 SYRINGE (ML) INJECTION AS NEEDED
Status: DISCONTINUED | OUTPATIENT
Start: 2025-03-21 | End: 2025-03-22 | Stop reason: HOSPADM

## 2025-03-21 RX ORDER — PROPOFOL 10 MG/ML
VIAL (ML) INTRAVENOUS AS NEEDED
Status: DISCONTINUED | OUTPATIENT
Start: 2025-03-21 | End: 2025-03-21 | Stop reason: SURG

## 2025-03-21 RX ORDER — SODIUM CHLORIDE 0.9 % (FLUSH) 0.9 %
3 SYRINGE (ML) INJECTION EVERY 12 HOURS SCHEDULED
Status: DISCONTINUED | OUTPATIENT
Start: 2025-03-21 | End: 2025-03-21 | Stop reason: HOSPADM

## 2025-03-21 RX ORDER — ACETAMINOPHEN 325 MG/1
650 TABLET ORAL EVERY 4 HOURS PRN
Status: DISCONTINUED | OUTPATIENT
Start: 2025-03-21 | End: 2025-03-21 | Stop reason: HOSPADM

## 2025-03-21 RX ORDER — SODIUM CHLORIDE, SODIUM LACTATE, POTASSIUM CHLORIDE, CALCIUM CHLORIDE 600; 310; 30; 20 MG/100ML; MG/100ML; MG/100ML; MG/100ML
9 INJECTION, SOLUTION INTRAVENOUS CONTINUOUS
Status: DISCONTINUED | OUTPATIENT
Start: 2025-03-21 | End: 2025-03-22 | Stop reason: HOSPADM

## 2025-03-21 RX ORDER — OXYCODONE AND ACETAMINOPHEN 7.5; 325 MG/1; MG/1
1 TABLET ORAL EVERY 4 HOURS PRN
Status: DISCONTINUED | OUTPATIENT
Start: 2025-03-21 | End: 2025-03-22 | Stop reason: HOSPADM

## 2025-03-21 RX ADMIN — Medication 3 ML: at 20:28

## 2025-03-21 RX ADMIN — MUPIROCIN 1 APPLICATION: 20 OINTMENT TOPICAL at 20:27

## 2025-03-21 RX ADMIN — FAMOTIDINE 20 MG: 20 TABLET, FILM COATED ORAL at 07:11

## 2025-03-21 RX ADMIN — ONDANSETRON 4 MG: 2 INJECTION INTRAMUSCULAR; INTRAVENOUS at 07:55

## 2025-03-21 RX ADMIN — ROCURONIUM BROMIDE 50 MG: 10 INJECTION INTRAVENOUS at 07:46

## 2025-03-21 RX ADMIN — APIXABAN 5 MG: 5 TABLET, FILM COATED ORAL at 12:02

## 2025-03-21 RX ADMIN — ROCURONIUM BROMIDE 20 MG: 10 INJECTION INTRAVENOUS at 10:23

## 2025-03-21 RX ADMIN — GLYCOPYRROLATE 0.3 MG: 1 INJECTION INTRAMUSCULAR; INTRAVENOUS at 08:19

## 2025-03-21 RX ADMIN — PROPOFOL 200 MG: 10 INJECTION, EMULSION INTRAVENOUS at 07:46

## 2025-03-21 RX ADMIN — Medication 10 ML: at 20:00

## 2025-03-21 RX ADMIN — Medication 3 ML: at 12:07

## 2025-03-21 RX ADMIN — Medication 3 ML: at 21:00

## 2025-03-21 RX ADMIN — SUGAMMADEX 200 MG: 100 INJECTION, SOLUTION INTRAVENOUS at 11:06

## 2025-03-21 RX ADMIN — FAMOTIDINE 20 MG: 10 INJECTION, SOLUTION INTRAVENOUS at 12:09

## 2025-03-21 RX ADMIN — LIDOCAINE HYDROCHLORIDE 50 MG: 10 INJECTION, SOLUTION EPIDURAL; INFILTRATION; INTRACAUDAL; PERINEURAL at 07:46

## 2025-03-21 RX ADMIN — SODIUM CHLORIDE, POTASSIUM CHLORIDE, SODIUM LACTATE AND CALCIUM CHLORIDE: 600; 310; 30; 20 INJECTION, SOLUTION INTRAVENOUS at 07:44

## 2025-03-21 RX ADMIN — ROCURONIUM BROMIDE 30 MG: 10 INJECTION INTRAVENOUS at 08:41

## 2025-03-21 RX ADMIN — EPHEDRINE SULFATE 10 MG: 50 INJECTION INTRAVENOUS at 08:14

## 2025-03-21 RX ADMIN — PHENYLEPHRINE HYDROCHLORIDE 30 MCG/MIN: 10 INJECTION INTRAVENOUS at 07:46

## 2025-03-21 RX ADMIN — APIXABAN 5 MG: 5 TABLET, FILM COATED ORAL at 20:27

## 2025-03-21 RX ADMIN — DEXAMETHASONE SODIUM PHOSPHATE 8 MG: 4 INJECTION, SOLUTION INTRAMUSCULAR; INTRAVENOUS at 07:55

## 2025-03-21 RX ADMIN — Medication 10 ML: at 12:06

## 2025-03-21 NOTE — INTERVAL H&P NOTE
H&P updated. The patient was examined and the following changes are noted:  Since the patient's office visit he did not require an overnight stay at [facility] for ventricular tachycardia.  Device interrogation revealed 10 second episode of NSVT. This was self terminating. He did not receive therapy.  No changes were made in the patient's medication regimen.  He was continued on his maintenance amiodarone and Coreg.  He was discharged home.  He presents today to undergo VT ablation as previously scheduled.  The patient states he did take his amiodarone and Coreg this morning.

## 2025-03-21 NOTE — Clinical Note
A sheath was successfully inserted with ultrasound guidance into the right femoral vein. 8.5 fr Agilis

## 2025-03-21 NOTE — Clinical Note
Number of grounding pads used: 3  Location of grounding pads: bilateral lower flank  and right thigh

## 2025-03-21 NOTE — ANESTHESIA POSTPROCEDURE EVALUATION
Patient: Sonido Newsome    Procedure Summary       Date: 03/21/25 Room / Location: [facility] LAB E / [facility]; [facility] ANESTHESIA    Anesthesia Start: 0735 Anesthesia Stop: 1113    Procedures:       Ablation VT Rhythmia GA echo to be done prior no meds to hold      NERVE BLOCK Diagnosis:       Ventricular tachycardia      (VT)    Scheduled Providers: Jn Shah DO Provider: Reilly Ceballos MD    Anesthesia Type: general ASA Status: 4            Anesthesia Type: general    Vitals  Vitals Value Taken Time   /73 03/21/25 11:13   Temp 97.3 °F (36.3 °C) 03/21/25 11:13   Pulse 68 03/21/25 11:13   Resp 10 03/21/25 11:13   SpO2 98 % 03/21/25 11:13           Post Anesthesia Care and Evaluation    Patient location during evaluation: PACU  Patient participation: complete - patient participated  Level of consciousness: awake and alert  Pain management: adequate    Airway patency: patent  Anesthetic complications: No anesthetic complications  PONV Status: none  Cardiovascular status: hemodynamically stable and acceptable  Respiratory status: nonlabored ventilation, acceptable and nasal cannula  Hydration status: acceptable

## 2025-03-21 NOTE — PROGRESS NOTES
Intensive Care Follow-up     Hospital:  LOS: 0 days   Mr. Sonido Newsome, 49 y.o. male is followed for:   Ventricular tachycardia   Followed postoperatively for medical needs including hypertension management in the intensive care unit     Subjective   Interval History:    Sonido Newsome is a 49 year-old male with ischemic cardiomyopathy (LVEF 20%) with ICD placement for sustained monomorphic VT, remote LAD infarction with large apical aneurysm, HTN, hyperlipidemia, CAD with  of LAD, moderate MR, and tobacco abuse that presents to [facility] ICU today from scheduled ablation for VT with Dr. Shah. ICD reprogrammed following procedure. Patient's known extensive left ventricular apical aneurysm was the focus of the ablation.     He is being admitted following the procedure for close monitoring. He will need uninterrupted anticoagulation for 90 days per Pablo and he is to remain off Amiodarone.     Time spent: 4 minutes  Electronically signed by VALARIE Li, 03/21/25, 12:22 PM EDT.    The patient was seen upon arrival to the intensive care unit.  He is currently without complaints of pain or shortness of breath.        The patient's past medical, surgical and social history were reviewed and updated in Epic as appropriate.        Objective     Infusions:  [START ON 3/22/2025] lactated ringers, 9 mL/hr  lactated ringers, 9 mL/hr      Medications:  apixaban, 5 mg, Oral, Q12H  sodium chloride, 10 mL, Intravenous, Q12H  sodium chloride, 3 mL, Intravenous, Q12H        Vital Sign Min/Max for last 24 hours  Temp  Min: 97 °F (36.1 °C)  Max: 97.6 °F (36.4 °C)   BP  Min: 112/77  Max: 131/73   Pulse  Min: 62  Max: 68   Resp  Min: 10  Max: 18   SpO2  Min: 96 %  Max: 98 %   Flow (L/min) (Oxygen Therapy)  Min: 2  Max: 4       Input/Output for last 24 hour shift  No intake/output data recorded.   S RR:  [8-12] 8  Objective:  General Appearance:  Uncomfortable, in no acute distress and not in pain.    Vital signs:  "(most recent): Blood pressure 128/89, pulse 66, temperature 97.6 °F (36.4 °C), temperature source Oral, resp. rate 16, height 175.3 cm (69\"), weight 81 kg (178 lb 9.6 oz), SpO2 97%.    HEENT: Normal HEENT exam.    Lungs:  Normal effort and normal respiratory rate.  Breath sounds clear to auscultation.    Heart: Normal rate.  Regular rhythm.  S1 normal and S2 normal.  No murmur.   Chest: Symmetric chest wall expansion.   Abdomen: Abdomen is soft and non-distended.  Bowel sounds are normal.   There is no abdominal tenderness.     Extremities: Normal range of motion.    Pulses: Distal pulses are intact.    Neurological: Patient is alert and oriented to person, place and time.    Pupils:  Pupils are equal, round, and reactive to light.  Pupils are equal.   Skin:  Warm.  No rash.                     I reviewed the patient's results and images.     Assessment & Plan   Impression        Ventricular tachycardia    Tobacco use    HFrEF (EF <20%)    Essential hypertension    ICD (implantable cardioverter-defibrillator), dual, in situ       Plan        Blood pressure control as before.  We will monitor the patient for any sign of arrhythmia.  Mobilize as surgically appropriate.  Tobacco cessation education prior to discharge.  Follow-up orders and labs have been placed.       I discussed the patient's findings and my recommendations with patient and nursing staff         Nilson Brownlee MD, [facility]  Pulmonology and Critical Care Medicine      "

## 2025-03-21 NOTE — ANESTHESIA PROCEDURE NOTES
"Peripheral Block      Patient reassessed immediately prior to procedure    Patient location during procedure: OR  Reason for block: at surgeon's request and post-op pain management  Preanesthetic Checklist  Completed: patient identified, IV checked, site marked, risks and benefits discussed, surgical consent, monitors and equipment checked, pre-op evaluation and timeout performed  Prep:  Pt Position: supine  Sterile barriers:cap, gloves, mask and washed/disinfected hands  Prep: ChloraPrep  Patient monitoring: blood pressure monitoring, continuous pulse oximetry and EKG  Procedure  Performed under: general  Guidance:ultrasound guided and landmark technique  Images:still images obtained, printed/placed on chart    Laterality:Bilateral  Block Type:PECS I and PECS II  Injection Technique:single-shot  Needle Type:short-bevel  Needle Gauge:20 G  Resistance on Injection: none          Medications  Preservative Free Saline:10ml  Comment:Block Injection:  Total volume of LA divided between Right and Left sided blocks         Post Assessment  Injection Assessment: negative aspiration for heme, incremental injection and no paresthesia on injection  Patient Tolerance:comfortable throughout block  Complications:no  Additional Notes  Interpectoral-Pectoserratus Plane   A high-frequency linear transducer, with sterile cover, was placed medial to the coracoid process in the paramedian sagittal plane. The transducer was moved caudally to the 4th rib and rotated slightly to allow an in-plane needle trajectory from medial to lateral. Pectoralis Major Muscle (PMM), Pectoralis Minor Muscle (PmM), Thoracoacromial Artery, Ribs, and Pleura were identified under ultrasound. The insertion site was prepped in sterile fashion and then localized with 2-5 ml of 1% Lidocaine. Using ultrasound-guidance, a 20-gauge B-Phillips 4\" Ultraplex 360 non-stimulating echogenic needle was advanced in plane until the tip of the needle was in the fascial plane " between the PMM and PmM, lateral to the Thoracoacromial Artery. 1-3ml of preservative free normal saline was used to hydro-dissect the fascial planes. After the fascial plane was verified, 10ml local anesthetic (LA) was injected for Interpectoral fascial plane block. The needle was continued along the same path to the level of the 4th rib below PmM.  Initially preservative free normal saline was used to confirm needle position and then 20 ml of LA was injected for Pectoserratus fascial plane block. Aspiration every 5 ml to prevent intravascular injection. Injection was completed with negative aspiration of blood and negative intravascular injection. Injection pressures were normal with minimal resistance.     Performed by: Fawad Spears CRNA

## 2025-03-21 NOTE — Clinical Note
Hemostasis started on the right femoral vein. Mynx and Vascade MVP was used in achieving hemostasis. Closure device deployed in the vessel. Hemostasis achieved successfully.

## 2025-03-21 NOTE — ANESTHESIA PROCEDURE NOTES
Airway  Reason: elective    Date/Time: 3/21/2025 7:48 AM  Airway not difficult    General Information and Staff    Patient location during procedure: OR  CRNA/CAA: Fawad Spears CRNA    Indications and Patient Condition  Indications for airway management: airway protection    Preoxygenated: yes  MILS not maintained throughout    Mask difficulty assessment: 1 - vent by mask    Final Airway Details    Final airway type: endotracheal airway      Successful airway: ETT  Cuffed: yes   Successful intubation technique: video laryngoscopy  Endotracheal tube insertion site: oral  Blade: Guido  Blade size: 4  ETT size (mm): 8.0  Cormack-Lehane Classification: grade I - full view of glottis  Placement verified by: chest auscultation and capnometry   Measured from: lips  ETT/EBT  to lips (cm): 22  Number of attempts at approach: 1  Assessment: lips, teeth, and gum same as pre-op and atraumatic intubation    Additional Comments  Negative epigastric sounds, Breath sound equal bilaterally with symmetric chest rise and fall

## 2025-03-21 NOTE — Clinical Note
Hemostasis started on the left femoral vein. Mynx and Vascade MVP was used in achieving hemostasis. Closure device deployed in the vessel. Hemostasis achieved successfully.

## 2025-03-21 NOTE — PLAN OF CARE
Goal Outcome Evaluation:  Plan of Care Reviewed With: patient        Progress: improving     Post ablation. Alert and oriented. VSS NSR. Groin sites D/I and soft. Distal pulses intact. Lasix given today with very good output. See I/O. For details. Continue with plan of care.

## 2025-03-21 NOTE — PROGRESS NOTES
Patient has been initiated on Apixaban (Eliquis) during admission. Education provided on 3/21/2025 verbally and in writing. Information provided includes effects of medication, drug-drug and drug-food interactions, and signs/symptoms of bleeding and clotting. Patient/family verbalized understanding through teach back. All pertinent questions were answered by pharmacist.    Daryl Monterroso PharmD   3/21/2025  13:20 EDT

## 2025-03-21 NOTE — ANESTHESIA PREPROCEDURE EVALUATION
Anesthesia Evaluation     Patient summary reviewed and Nursing notes reviewed   NPO Solid Status: > 8 hours  NPO Liquid Status: > 2 hours           Airway   Mallampati: I  TM distance: >3 FB  Neck ROM: full  No difficulty expected  Dental      Pulmonary    (+) a smoker Current, cigarettes,  (-) pneumonia (CXR clear 3/11/25), asthma, shortness of breath, sleep apneaRecent URI: recent PNA now recovered- 4 weeks ago.  Cardiovascular     ECG reviewed    (+) pacemaker ICD, hypertension, valvular problems/murmurs MR, past MI , CAD, dysrhythmias (VT) Tachycardia, CHF Systolic <55%, hyperlipidemia  (-) cardiac stents    ROS comment: ECG NSR    IMI    A-S MI     ECHO 2025  EF = 29%· wall segments HK   segments are AK    LA volume is mildly increased.      RA dilated     Moderate MR         RVSP ·normal (<35 mmHg)    MPS 2021   infarct located in the distal anterior, apex, and distal inferior walls with no significant ischemia noted.   EF = 23%.    intermediate risk study.    CATH · 2021   LM:Normal  LAD: 100% chronic total occlusion proximally with collaterals from the right system  LCX: Luminal irregularities  RCA: Luminal irregularities, collaterals to the LAD  LV: LVEF severe mid and distal anterior wall apical distal inferior wall dyskinesis with a large scar ejection fraction approximately 20%           Neuro/Psych  (-) seizures, CVA  GI/Hepatic/Renal/Endo    (-) no renal disease, diabetes, no thyroid disorder    Musculoskeletal     Abdominal    Substance History      OB/GYN          Other                      Anesthesia Plan    ASA 4     general     (Clearsight VS A line -reduced EF (last was 29%)   CAD - LAD collateral flow dependent aim MAP >70)  intravenous induction     Anesthetic plan, risks, benefits, and alternatives have been provided, discussed and informed consent has been obtained with: patient.    Plan discussed with CRNA.      CODE STATUS:

## 2025-03-21 NOTE — OP NOTE
"      Cardiac Electrophysiology Procedure Note      [facility]    CATHETER ABLATION OF VENTRICULAR TACHYCARDIA       PROCEDURES PERFORMED:    Catheter ablation of ventricular tachycardia  Full diagnostic EP study  3D electroanatomic mapping  Intracardiac Echocardiography  Left ventricular pacing and recording  Retrograde approach to LV access  Transeptal puncture  Preoperative ICD reprogramming  Postoperative ICD reprogramming    PREPROCEDURAL DIAGNOSES:    1. VT   2. Ischemic cardiomyopathy / remote LAD infarction with large apical aneurysm / HFrEF / LVEF 20% / NYHA III heart failure  3. [employer] TV ICD with recurrent ICD therapy for sustained monomorphic VT    POST PROCEDURE DIANGOSES:    1.  As above.    INDICATION FOR PROCEDURE:  Briefly, Sonido Newsome is a 49 y.o. year old male with a history of ischemic cardiomyopathy, remote LAD infarction with large apical aneurysm, HFrEF, LVEF 20%, NYHA III heart failure and a [employer] TV ICD with recurrent ICD therapy for sustained monomorphic VT. presented today for elective outpatient catheter ablation of his recurrent VT.  He has failed amiodarone therapy.  He is 49 years old.  He is working diligently towards quitting smoking which has been a lifelong addiction for him.    DESCRIPTION OF TARGETED CLINICAL VENTRICULAR TACHYCARDIA OR PVCS:    1.  No twelve-lead or ECG documentation of VT all VT's noted on intracardiac EGM's from his ICD clinical VT had a cycle length of 330 ms most recently however there were numerous faster VT's as well.  2.  Our goal today was empiric substrate modification.    ANTICOAGULATION STRATEGY PRIOR TO AND POST PROCEDURE: None prior to today.        PT/INR:  No results found for: \"LABPROT\", \"INR\"  PTT:  No results found for: \"APTT\"    CBC: No results found for: \"WBC\", \"RBC\", \"HGB\", \"HCT\", \"MCV\", \"RDW\", \"PLT\"  BMP:   No results found for: \"NA\", \"K\", \"CL\", \"CO2\", \"BUN\", " "\"CREATININE\", \"LABCREA\", \"EGFR\", \"GLU\", \"LABGLUC\"    Vital Signs: /73 (BP Location: Right arm, Patient Position: Lying)   Pulse 68   Temp 97.3 °F (36.3 °C) (Temporal)   Resp 14   Ht 175.3 cm (69\")   Wt 81 kg (178 lb 9.6 oz)   SpO2 98%   BMI 26.37 kg/m²      Admit Weight:  81 kg (178 lb 9.6 oz)  BMI: Body mass index is 26.37 kg/m².    PROCEDURE NARRATIVE:    The patient was able to give written informed consent after revisiting the key portions of the risk versus benefit profile of the procedure.  This discussion was framed by our lengthy conversations  (please see our detailed notes).  Patient verbalized strong understanding of this discussion and a strong desire to proceed with the procedure.  Please note that this detailed informed consent process utilized mutual and shared decision making process between all parties involved, principally the physician and patient, but also potentially with input from the patient's selected family and friends.    The patient was brought to the EP laboratory in the post absorptive state.    Preoperative ICD interrogation was performed personally.  Tachycardia detection and therapy was personally programmed off.    This patient's Cardiac Implanted Electronic Device was manually interrogated and reprogrammed during the patient encounter today.  Iterative programming changes were manually made to determine the sensing threshold, pacing threshold, lead impedance as well as underlying cardiac rhythm.  These programming changes were not limited to but included some or all of the following when appropriate: pacing mode, programmed AV delays, blanking periods, and refractory periods.  Data obtained as a result of these manual programing changes informed the patient's CIED permanent programming.      The patient was electively intubated for the procedure and given a general anesthetic by colleagues from anesthesia.      The patient was then prepared and draped in a routing " sterile fashion.  Seldinger access was obtained and the following sheaths were advanced in an over the wire fashion to the heart:    1) right common femoral artery 8.5 Urdu sheath used for blood pressure monitoring throughout as well as for retrograde access to the left ventricle  2) right common femoral vein long deflectable Agilis sheath used for transseptal access and ultimately access to the left ventricle  3) right common femoral vein short 9 Urdu sheath used for 8 Urdu phased-array intracardiac echocardiography  4) right common femoral vein short 8 Urdu sheath used for decapolar electrophysiology catheter placed to the coronary sinus for left atrial pacing and record  5) left common femoral vein short 6 Urdu sheath used for Penta Gagan electrophysiology catheter placed to the right ventricular apex for pacing and recording    The patient was anticoagulated with intravenous heparin (initial bolus and then continuous infusion) with a goal ACT of between 350 and 400 seconds.    A 8 Urdu phased array ICE catheter was placed into the right atrium and right ventricle.  This was used for transeptal puncture, monitoring of the pericardial space, monitoring of the ablation catheter position within the heart and monitoring of other cardiac structures.    Transeptal puncture was performed after heparin administration with a combination of echocardiographic and fluoroscopic guidance.  This was performed with a large curve Agilis sheath, a radiofrequency transseptal system.  The catheter and sheath were advanced safely into the left atrium.      A mapping catheter was placed into the left ventricle via a retrograde aortic approach safely using fluoroscopy.     A [address] 64 electrode [employer] diagnostic electrophysiology catheter and a Thermo[employer] RMT 3.5 mm irrigated tip ablation catheter were used for pacing and recording in the left ventricle.  The ablation catheter was used only via transseptal  access.  The Darrell catheter was used for mapping and recording in the left ventricle not only transseptal access but also via retrograde placement into the left ventricle    We used the Pol[employer] 3D electroanatomic mapping system in conjunction with these catheters to construct an electroanatomic shell of the ventricle and it is relevant structures.    The [employer] remote magnetic navigation system was used for manipulation of the ablation catheter.  Other catheters were manually maneuvered.    An EP study was performed.  Data obtained from this is listed in the below table:    Initial Study    Isuprel Washout Study           drive train / burst extrastim    QRS 95    Rhythm          Atrial CL      R-R 1188    Ventricular CL      AH 71    AVBCL      HV 50    AVNERP       drive train / burst extrastim   Slow Pway ERP      Rhythm     Fast Pway ERP      Atrial CL     VABCL conc? /  Dec?      Ventricular CL     VAERP      AVBCL     AERP      AVNERP     VERP      Slow Pway ERP     AP antegrade ERP      Fast Pway ERP     AP retrograde ERP      VABCL conc? /  Dec?           VAERP    Final Study      AERP      drive train / burst extrastim    VERP     Rhythm      AP antegrade ERP     Atrial CL      AP retrograde ERP     Ventricular CL           AVBCL     Isuprel Dose =      AVNERP       drive train / burst extrastim   Slow Pway ERP      Rhythm     Fast Pway ERP      Atrial CL     VABCL conc? /  Dec?      Ventricular CL     VAERP      AVBCL     AERP      AVNERP     VERP      Slow Pway ERP     AP antegrade ERP      Fast Pway ERP     AP retrograde ERP      VABCL conc? /  Dec?           VAERP           AERP           VERP           AP antegrade ERP           AP retrograde ERP                         VT Induction EP study ([facility] Protocol)        No Isuprel     Site One (RV [place] /  RV Septum)      Drive Train V ERP    350     400     600 / 550         Site Two (RV [place] /  RV Septum)      Drive Train V ERP    350     400     600 / 550         Isuprel Dose =      Site One (RV [place] /  RV Septum)      Drive Train V ERP    350     400     600 / 550         Site Two (RV [place] /  RV Septum)     Drive Train V ERP    350     400     600 / 550        DESCRIPTION OF ARRHYTHMIAS INDUCED:    1.  Substrate modification was performed today.    High density electroanatomic mapping was performed of the left ventricle during biventricular apical pacing at a cycle length of 600 ms.  Voltage mapping was performed.  Definition of scar was less than 0.5 mV.  Healthy tissue was defined as greater than 1.5 mV.  Intermediate tissue was defined as less than 1.5 to greater than 0.5 mV.  Additionally during right ventricular apical pacing we sought to define late potentials extensively defining these as discrete electrograms occurring after the QRS complex.  Extensive mapping is performed throughout the entire left ventricle with particular attention to the patient's large apical aneurysm.  Many thousand activation points were obtained.    We delineated a very large apical scar.  The mid to anterior portion of this demonstrated diffuse late potentials.  The more inferior portion of the scar was dense with in many places complete absence of voltage below the voltage threshold of the mapping system and really no late potentials.    DESCRIPTION OF THE ABLATION STRATEGY:    Catheter ablation was performed with power settings limited to 47 W.  Lesions were made using the contact force technology such that no lesion points were placed on the cardiac 3D electroanatomic map unless there was stable catheter position for at least 20 seconds, excellent stability, and consistent and stable contact.  After radiofrequency application for 20 to 60 seconds each ablation point was once again assessed with pacing from the ablation catheter at 1 ms and 10 mA output.  The endpoint was local noncapture at this output.  If the tissue under  the catheter was still excitable after radiofrequency application radiofrequency application was repeated until this tissue was not excitable.  All late potentials were ablated.  The area of scar was consolidated.    Catheters and sheaths were removed from the left atrium and heparin was discontinued.  Protamine was given to reverse the heparin.    The ICE catheter revealed that there was no pericardial effusion.    Catheters and sheaths were then removed from the body.  Hemostasis was achieved with manual pressure after reversal of anticoagulation with protamine.  The patient was extubated in routine fashion and transferred to PACU in stable condition.    All hemostasis was achieved with a combination of Vascade and Mynx closure devices.  Bedrest x 2 hours.    Overnight ICU observation per protocol.    No complications.  The procedure went well.    The above was communicated to the patient prior to him leaving the room.    60 mg of IV Lasix given prior to leaving the room.    Initiate apixaban 5 mg twice daily in 1 hour.  We will continue this for 90 days postoperatively.    Postoperative ICD interrogation was performed.  I personally reprogrammed the patient's ICD to have 3 zones of tachycardia detection and therapy.  Nominal functions were documented of the ICD.    This patient's Cardiac Implanted Electronic Device was manually interrogated and reprogrammed during the patient encounter today.  Iterative programming changes were manually made to determine the sensing threshold, pacing threshold, lead impedance as well as underlying cardiac rhythm.  These programming changes were not limited to but included some or all of the following when appropriate: pacing mode, programmed AV delays, blanking periods, and refractory periods.  Data obtained as a result of these manual programing changes informed the patient's CIED permanent programming.    COMPLICATIONS: none    EBL: minimal              KEY PROCEDURAL  FINDINGS:    Extensive left ventricular apical aneurysm with late potentials throughout.  Extensive substrate modification with extensive ablation throughout the LV apical aneurysm to eliminate all late potentials.    POST PROCEDURAL PLAN:    1.  Report was called to the ICU nurse responsible for the patients care.  Uninterrupted anticoagulation for not less than 90 days unless specially instructed otherwise by myself or another member of our EP physician team.  Please note that the patient and the patient’s family have been extensively counseled about this critical requirement and have agreed to comply.  Medications were reconciled, and key changes in medications include: Addition of apixaban 5 mg twice daily for 90 days.  Lets leave him on amiodarone at least for now.  If the rest of today and tonight is uneventful home tomorrow.  The patient will be seen at our office per routine follow up.    Jn Shah DO, FACC, [facility]  Cardiac Electrophysiologist  [facility] / [facility]

## 2025-03-22 VITALS
TEMPERATURE: 97.4 F | HEART RATE: 54 BPM | OXYGEN SATURATION: 99 % | HEIGHT: 69 IN | DIASTOLIC BLOOD PRESSURE: 80 MMHG | WEIGHT: 178.6 LBS | BODY MASS INDEX: 26.45 KG/M2 | SYSTOLIC BLOOD PRESSURE: 137 MMHG | RESPIRATION RATE: 18 BRPM

## 2025-03-22 RX ORDER — AMIODARONE HYDROCHLORIDE 200 MG/1
200 TABLET ORAL DAILY
Qty: 30 TABLET | Refills: 1 | Status: SHIPPED | OUTPATIENT
Start: 2025-03-22

## 2025-03-22 RX ADMIN — Medication 10 ML: at 09:25

## 2025-03-22 RX ADMIN — Medication 3 ML: at 09:25

## 2025-03-22 RX ADMIN — MUPIROCIN 1 APPLICATION: 20 OINTMENT TOPICAL at 08:25

## 2025-03-22 RX ADMIN — APIXABAN 5 MG: 5 TABLET, FILM COATED ORAL at 08:20

## 2025-03-22 NOTE — DISCHARGE SUMMARY
"[facility] - Cardiology Discharge Summary    Date of Admission: 3/21/2025    Date of Discharge:  3/22/2025    Discharge Diagnosis: ventricular tachycardia    Presenting Problem/History of Present Illness  Ventricular tachycardia [I47.20]  VT (ventricular tachycardia) [I47.20]      Hospital Course  Sonido Newsome is a 49 y.o. male who presented to [facility] for EP study and VT ablation on 3/21 with Dr. Shah.  There were no intra procedural complications and the patient was admitted to  overnight for observation.  He did well overnight with some mild bleeding from his right groin but no residual hematoma or swelling.  He did not have any ventricular tachycardia overnight.  Will be discharged home with GDMT, to continue amiodarone per Dr. Shah, and 90 days of apixaban    Procedures Performed  Procedure(s):  Ablation VT Rhythmia GA echo to be done prior no meds to hold       Results for orders placed during the hospital encounter of 07/08/21    Cardiac Catheterization/Vascular Study    Narrative  Cardiac Catheterization    Referring Provider:    Indication(s) for this Procedure: Ventricular tachycardia, suspected acute anterolateral myocardial infarction.    Procedure(s) Performed: Left heart catheterization, coronary angiography, left ventriculography    Description of the Procedure:  Informed consent was obtained.  Patient was brought to the lab as a quotation Jd Field, STEMI\" after he had ventricular tachycardia.  A sheath was placed a left radial artery and selective angiography of the right left coronary arteries as well as left heart catheterization left ventriculography was performed.  Procedure is terminated and the sheath was removed with the TR band and there were no early complications.      Angiographic Findings:  Coronary dominance, right  · LM:   Normal  · LAD: 100% chronic total occlusion proximally with collaterals from the right system  · LCX: Luminal irregularities  · RCA: " Luminal irregularities, collaterals to the LAD    LV: LVEF severe mid and distal anterior wall apical distal inferior wall dyskinesis with a large scar ejection fraction approximately 20%      Hemodynamic Findings:  Ao pressure: 120/70 mmHg  LVEDP: 10 mmHg      EBL: None  Specimen(s): None obtained    Complications: There were no early complications.    Final Impression(s):    CAD with  of the LAD  Segmental wall motion normalities with severely reduced left ventricular systolic function  Primary ventricular tachycardia    Recommendations:    Medical management  Interrogation of the [employer] device       Consults:   Consults       Date and Time Order Name Status Description    3/12/2025  4:29 AM Inpatient Cardiology Consult Completed             Pertinent Test Results:   Results for orders placed in visit on 03/03/25    Adult Transthoracic Echo Complete W/ Cont if Necessary Per Protocol    Interpretation Summary    Left ventricular systolic function is moderately decreased. Calculated left ventricular EF = 29% Left ventricular ejection fraction appears to be 26 - 30%.    The left ventricular cavity is moderately dilated.    The following left ventricular wall segments are hypokinetic: mid anterior, basal anterolateral, mid anterolateral, basal inferolateral, mid inferolateral, mid inferior, basal inferoseptal, mid inferoseptal, mid anteroseptal, basal anterior, basal inferior and basal inferoseptal. The following left ventricular wall segments are akinetic: apical anterior, apical lateral, apical inferior, apical septal and apex.    Left atrial volume is mildly increased.    The right atrial cavity is dilated.    Moderate mitral valve regurgitation is present.    Estimated right ventricular systolic pressure from tricuspid regurgitation is normal (<35 mmHg). Calculated right ventricular systolic pressure from tricuspid regurgitation is 20 mmHg.    EF slightly improved compared to the echocardiogram from  "2021 when it was estimated at 20%.        Condition on Discharge:  good    Physical Exam at Discharge    Vital Signs  /80   Pulse 54   Temp 97.4 °F (36.3 °C) (Axillary)   Resp 18   Ht 175.3 cm (69\")   Wt 81 kg (178 lb 9.6 oz)   SpO2 99%   BMI 26.37 kg/m²       Physical Exam:  Gen: well developed, lying in bed, comfortable appearing  HEENT: MMM, sclera anicteric, conjunctiva normal  CV: regular rate, regular rhythm, no murmurs or rubs, normal S1, S2. 2+ radial and DP pulses  Pulm: RA, normal work of breathing, no wheezes, rales, rhonchi  Ext: normal bulk for age, normal tone, no dependent edema, bilateral groin sites dressed with minimal tenderness to palpation, no ecchymosis or swelling  Neuro: alert, oriented, face symmetrical, moving all extremities well  Psych: normal mood, appropriate affect    Discharge Disposition  Home or Self Care    Discharge Medications     Discharge Medications        New Medications        Instructions Start Date   apixaban 5 MG tablet tablet  Commonly known as: Eliquis   5 mg, Oral, Every 12 Hours Scheduled             Changes to Medications        Instructions Start Date   amiodarone 200 MG tablet  Commonly known as: PACERONE  What changed:   how much to take  when to take this   200 mg, Oral, Daily, TID for 10 days then 200mg Daily.             Continue These Medications        Instructions Start Date   aspirin 81 MG EC tablet   81 mg, Oral, Daily      carvedilol 12.5 MG tablet  Commonly known as: COREG   12.5 mg, Oral, 2 Times Daily With Meals      losartan 25 MG tablet  Commonly known as: COZAAR   25 mg, Daily               Discharge Diet: Cardiac    Activity at Discharge: As tolerated    Follow-up Appointments  Future Appointments   Date Time Provider Department Center   4/28/2025  3:45 PM Kyela Gutierrez APRN [facility]         Test Results Pending at Discharge        Time: I spent  20  minutes on this discharge activity which included: face-to-face encounter " with the patient, reviewing the data in the system, coordination of the care with the nursing staff as well as consultants, documentation, and entering orders.       Fawad Emerson MD  03/22/25  10:36 EDT

## 2025-03-24 LAB
ACT BLD: 268 SECONDS (ref 82–152)
ACT BLD: 291 SECONDS (ref 82–152)
ACT BLD: 389 SECONDS (ref 82–152)
ACT BLD: 400 SECONDS (ref 82–152)

## 2025-03-25 ENCOUNTER — TELEPHONE (OUTPATIENT)
Dept: CARDIOLOGY | Facility: CLINIC | Age: 50
End: 2025-03-25
Payer: COMMERCIAL

## 2025-03-25 RX ORDER — LOSARTAN POTASSIUM 25 MG/1
25 TABLET ORAL DAILY
Qty: 90 TABLET | Refills: 1 | Status: SHIPPED | OUTPATIENT
Start: 2025-03-25

## 2025-04-28 ENCOUNTER — OFFICE VISIT (OUTPATIENT)
Dept: INTERNAL MEDICINE | Facility: CLINIC | Age: 50
End: 2025-04-28
Payer: COMMERCIAL

## 2025-04-28 VITALS
HEIGHT: 69 IN | TEMPERATURE: 98.4 F | SYSTOLIC BLOOD PRESSURE: 116 MMHG | HEART RATE: 68 BPM | DIASTOLIC BLOOD PRESSURE: 74 MMHG | BODY MASS INDEX: 27.85 KG/M2 | WEIGHT: 188 LBS

## 2025-04-28 DIAGNOSIS — Z92.89 HOSPITALIZATION WITHIN LAST 30 DAYS: Primary | ICD-10-CM

## 2025-04-28 DIAGNOSIS — I47.20 VT (VENTRICULAR TACHYCARDIA): ICD-10-CM

## 2025-04-28 DIAGNOSIS — Z98.890 S/P ABLATION OF VENTRICULAR ARRHYTHMIA: ICD-10-CM

## 2025-04-28 DIAGNOSIS — Z86.79 S/P ABLATION OF VENTRICULAR ARRHYTHMIA: ICD-10-CM

## 2025-04-28 RX ORDER — AMIODARONE HYDROCHLORIDE 200 MG/1
100 TABLET ORAL 2 TIMES DAILY
COMMUNITY
End: 2025-04-28

## 2025-04-28 RX ORDER — AMIODARONE HYDROCHLORIDE 200 MG/1
200 TABLET ORAL DAILY
Qty: 30 TABLET | Refills: 0 | Status: SHIPPED | OUTPATIENT
Start: 2025-04-28

## 2025-04-28 NOTE — PROGRESS NOTES
Office Note     Name: Sonido Newsome    : 1975     MRN: [id number]   Care Team: Patient Care Team:  Abi Granados PA-C as PCP - General (Physician Assistant)  Jorge Butler MD as Consulting Physician (Cardiology)  Jn Shah DO as Consulting Physician (Cardiology)    Chief Complaint  Rapid Heart Rate    Subjective     History of Present Illness:    Sonido is a pleasant 49-year-old male who comes to the office today for a follow-up after a cardiac ablation for ventricular tachycardia.  He was admitted to [facility] on 3/21/2025 and discharged 3/22/2025.  He had no complications and is doing well.  He is currently on amiodarone 200 mg daily along with Eliquis 5 mg twice daily.  He does state that he is only been taking the Eliquis once daily as states that this is the instructions on his bottle.  He denies chest pain, shortness of breath, visual changes, headaches, dizziness.  He is scheduled to return to cardiology next month.      Past Medical History:   Diagnosis Date    CAD 2021    HFrEF (EF <20%) 2021    TTE 3/12/2014:   EF estimated to be less than 20%  Moderate mitral regurgitation.     Moderate MR 2021    Myocardial infarction     Tobacco use 2021       Past Surgical History:   Procedure Laterality Date    CARDIAC CATHETERIZATION N/A 2021    Procedure: Left Heart Cath;  Surgeon: Mega Ellison MD;  Location: [facility] CATH INVASIVE LOCATION;  Service: Cardiovascular;  Laterality: N/A;    CARDIAC DEFIBRILLATOR PLACEMENT      CARDIAC ELECTROPHYSIOLOGY PROCEDURE N/A 3/21/2025    Procedure: Ablation VT Rhythmia GA echo to be done prior no meds to hold;  Surgeon: Jn Shah DO;  Location:  [facility] EP INVASIVE LOCATION;  Service: Cardiovascular;  Laterality: N/A;       Social History     Socioeconomic History    Marital status:    Tobacco Use    Smoking status: Every Day     Current packs/day: 0.25     Average packs/day: 0.3  "packs/day for 30.0 years (7.5 ttl pk-yrs)     Types: Cigarettes     Start date: 4/28/1995    Smokeless tobacco: Never   Vaping Use    Vaping status: Never Used    Passive vaping exposure: Yes   Substance and Sexual Activity    Alcohol use: Not Currently    Drug use: Defer    Sexual activity: Defer         Current Outpatient Medications:     amiodarone (PACERONE) 200 MG tablet, Take 1 tablet by mouth Daily., Disp: 30 tablet, Rfl: 0    apixaban (Eliquis) 5 MG tablet tablet, Take 1 tablet by mouth Every 12 (Twelve) Hours for 90 days. (Patient taking differently: Take 1 tablet by mouth Daily.), Disp: 180 tablet, Rfl: 0    aspirin 81 MG EC tablet, Take 1 tablet by mouth Daily., Disp: 30 tablet, Rfl: 6    carvedilol (COREG) 12.5 MG tablet, Take 1 tablet by mouth 2 (Two) Times a Day With Meals., Disp: 180 tablet, Rfl: 0    losartan (COZAAR) 25 MG tablet, Take 1 tablet by mouth Daily., Disp: 90 tablet, Rfl: 1    Procedures    PHQ-9 Total Score:      Objective     Vital Signs  /74 (BP Location: Left arm, Patient Position: Sitting, Cuff Size: Adult)   Pulse 68   Temp 98.4 °F (36.9 °C) (Temporal)   Ht 175.3 cm (69.02\")   Wt 85.3 kg (188 lb)   BMI 27.75 kg/m²   Estimated body mass index is 27.75 kg/m² as calculated from the following:    Height as of this encounter: 175.3 cm (69.02\").    Weight as of this encounter: 85.3 kg (188 lb).    BMI is >= 25 and <30. (Overweight) The following options were offered after discussion;: exercise counseling/recommendations and nutrition counseling/recommendations      Physical Exam  Vitals and nursing note reviewed.   HENT:      Head: Normocephalic.   Cardiovascular:      Rate and Rhythm: Normal rate.   Pulmonary:      Effort: Pulmonary effort is normal.      Breath sounds: Normal breath sounds.   Skin:     General: Skin is warm and dry.   Neurological:      General: No focal deficit present.      Mental Status: He is alert and oriented to person, place, and time.   Psychiatric:   "       Mood and Affect: Mood normal.         Behavior: Behavior normal.         Thought Content: Thought content normal.         Judgment: Judgment normal.          Assessment and Plan     Assessment/Plan:  Diagnoses and all orders for this visit:    1. Hospitalization within last 30 days (Primary)  - Continue current medications.  -Stressed compliance with Eliquis 5 mg twice daily.  -Continue follow-up with specialty.  -Encouraged an overall healthy, well-balanced diet with routine activity.  -Return in 3 months for annual physical and general health maintenance.    2. VT (ventricular tachycardia)  -     amiodarone (PACERONE) 200 MG tablet; Take 1 tablet by mouth Daily.  Dispense: 30 tablet; Refill: 0    3. S/P ablation of ventricular arrhythmia       There are no Patient Instructions on file for this visit.  Plan of care reviewed with patient at the conclusion of today's visit. Education was provided regarding diagnosis, management and any prescribed or recommended OTC medications.  Patient verbalizes understanding of and agreement with management plan.    Follow Up  Return in about 3 months (around 7/28/2025) for Annual Physical next visit.    Keyla Gutierrez, APRN

## 2025-06-19 RX ORDER — CARVEDILOL 12.5 MG/1
12.5 TABLET ORAL 2 TIMES DAILY WITH MEALS
Qty: 180 TABLET | Refills: 0 | Status: SHIPPED | OUTPATIENT
Start: 2025-06-19

## 2025-07-28 ENCOUNTER — OFFICE VISIT (OUTPATIENT)
Dept: INTERNAL MEDICINE | Facility: CLINIC | Age: 50
End: 2025-07-28
Payer: COMMERCIAL

## 2025-07-28 VITALS
TEMPERATURE: 98.2 F | HEIGHT: 69 IN | BODY MASS INDEX: 28.02 KG/M2 | OXYGEN SATURATION: 96 % | SYSTOLIC BLOOD PRESSURE: 130 MMHG | WEIGHT: 189.2 LBS | HEART RATE: 70 BPM | DIASTOLIC BLOOD PRESSURE: 82 MMHG

## 2025-07-28 DIAGNOSIS — Z13.21 ENCOUNTER FOR VITAMIN DEFICIENCY SCREENING: ICD-10-CM

## 2025-07-28 DIAGNOSIS — Z12.5 PROSTATE CANCER SCREENING: ICD-10-CM

## 2025-07-28 DIAGNOSIS — Z13.29 THYROID DISORDER SCREENING: ICD-10-CM

## 2025-07-28 DIAGNOSIS — Z72.0 TOBACCO ABUSE: ICD-10-CM

## 2025-07-28 DIAGNOSIS — I25.118 CORONARY ARTERY DISEASE OF NATIVE ARTERY OF NATIVE HEART WITH STABLE ANGINA PECTORIS: Primary | Chronic | ICD-10-CM

## 2025-07-28 DIAGNOSIS — Z95.810 ICD (IMPLANTABLE CARDIOVERTER-DEFIBRILLATOR), DUAL, IN SITU: ICD-10-CM

## 2025-07-28 DIAGNOSIS — L98.9 SKIN LESION: ICD-10-CM

## 2025-07-28 PROCEDURE — 3079F DIAST BP 80-89 MM HG: CPT | Performed by: PHYSICIAN ASSISTANT

## 2025-07-28 PROCEDURE — 3075F SYST BP GE 130 - 139MM HG: CPT | Performed by: PHYSICIAN ASSISTANT

## 2025-07-28 PROCEDURE — 99214 OFFICE O/P EST MOD 30 MIN: CPT | Performed by: PHYSICIAN ASSISTANT

## 2025-07-28 PROCEDURE — 1126F AMNT PAIN NOTED NONE PRSNT: CPT | Performed by: PHYSICIAN ASSISTANT
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
## 2025-07-29 RX ORDER — APIXABAN 5 MG/1
5 TABLET, FILM COATED ORAL EVERY 12 HOURS SCHEDULED
Qty: 180 TABLET | Refills: 0 | OUTPATIENT
Start: 2025-07-29

## (undated) DEVICE — CATH DIAG EXPO .056 FL3.5 6F 100CM

## (undated) DEVICE — GW PERIPH GUIDERIGHT STD/EXCHNG/J/TIP SS 0.035IN 5X260CM

## (undated) DEVICE — Device: Brand: CELSIUS RMT THERMOCOOL

## (undated) DEVICE — DIAGNOSTIC ELECTRODE CATHETER: Brand: WOVEN

## (undated) DEVICE — MYNX CONTROL™ VENOUS VASCULAR CLOSURE DEVICE 6F-12F: Brand: MYNX CONTROL™ VENOUS VASCULAR CLOSURE DEVICE 6F-12F

## (undated) DEVICE — ADULT, W/LG. BACK PAD, RADIOTRANSPARENT ELEMENT AND LEAD WIRE COMPATIBLE W/: Brand: DEFIBRILLATION ELECTRODES

## (undated) DEVICE — ACUMEN IQ CUFF ADULT: Brand: ACUMEN IQ CUFF ADULT

## (undated) DEVICE — INTRO SHEATH PRELUDE IDEAL SPRNG COIL 021 6F 23X80CM

## (undated) DEVICE — DEV INFL MONARCH 25W

## (undated) DEVICE — SET PRIMARY GRVTY 10DP MALE LL 104IN

## (undated) DEVICE — LIMB HOLDER, WRIST/ANKLE: Brand: DEROYAL

## (undated) DEVICE — PINNACLE INTRODUCER SHEATH: Brand: PINNACLE

## (undated) DEVICE — GW CORNRY QUIKCAS

## (undated) DEVICE — CATH ULTRASND ECHO ACUNAV FOR ACUSON 8F 90CM

## (undated) DEVICE — LOCATION REFERENCE PATCH KIT: Brand: RHYTHMIA™ MAPPING SYSTEM

## (undated) DEVICE — SYS CLS VASC/VENI VASCADE MVP 6TO12F

## (undated) DEVICE — INTRO STEER AGILIS NXT MED/CURL 8.5F

## (undated) DEVICE — Device: Brand: WEBSTER CS

## (undated) DEVICE — CANN NASL CAPNOFLEX SMPL CO2/O2 W/O2/DEL A/

## (undated) DEVICE — CATH DIAG EXPO .045 FL3  5F 100CM

## (undated) DEVICE — CATH DIAG EXPO M/ PK 5F FL4/FR4 PIG

## (undated) DEVICE — 1 X VERSACROSS TRANSSEPTAL SHEATH (INCLUDING  1 X J-TIP GUIDEWIRE); 1 X VERSACROSS RF WIRE (INCLUDING 1 X CONNECTOR CABLE (SINGLE USE)); 1 X DISPERSIVE ELECTRODE: Brand: VERSACROSS ACCESS SOLUTION

## (undated) DEVICE — DECANT BG O JET

## (undated) DEVICE — DRSNG SURESITE123 4X4.8IN

## (undated) DEVICE — MODEL BT2000 P/N 700287-012KIT CONTENTS: MANIFOLD WITH SALINE AND CONTRAST PORTS, SALINE TUBING WITH SPIKE AND HAND SYRINGE, TRANSDUCER: Brand: BT2000 AUTOMATED MANIFOLD KIT

## (undated) DEVICE — ST EXT IV SMRTSTE 2VLV FIX M LL 6ML 41

## (undated) DEVICE — LEX ELECTRO PHYSIOLOGY: Brand: MEDLINE INDUSTRIES, INC.

## (undated) DEVICE — ST INF PRI SMRTSTE 20DRP 2VLV 24ML 117

## (undated) DEVICE — Device: Brand: SMARTABLATE

## (undated) DEVICE — PK CATH CARD 10

## (undated) DEVICE — DEV COMP RAD PRELUDESYNC 24CM

## (undated) DEVICE — CATH DIAG EXPO M/ PK 6FR FL4/FR4 PIG 3PK

## (undated) DEVICE — HIGH RESOLUTION MAPPING CATHETER: Brand: INTELLAMAP ORION™

## (undated) DEVICE — ELECTRD RETRN/GRND MEGADYNE SGL/PLT W/CORD 9FT DISP

## (undated) DEVICE — SOL NACL 0.9PCT 1000ML

## (undated) DEVICE — GW INQWIRE FC PTFE STD J/1.5 .035 260

## (undated) DEVICE — GLIDESHEATH BASIC HYDROPHILIC COATED INTRODUCER SHEATH: Brand: GLIDESHEATH

## (undated) DEVICE — MODEL AT P65, P/N 701554-001KIT CONTENTS: HAND CONTROLLER, 3-WAY HIGH-PRESSURE STOPCOCK WITH ROTATING END AND PREMIUM HIGH-PRESSURE TUBING: Brand: ANGIOTOUCH® KIT